# Patient Record
Sex: FEMALE | Race: WHITE | Employment: OTHER | URBAN - METROPOLITAN AREA
[De-identification: names, ages, dates, MRNs, and addresses within clinical notes are randomized per-mention and may not be internally consistent; named-entity substitution may affect disease eponyms.]

---

## 2018-12-21 ENCOUNTER — HOSPITAL ENCOUNTER (EMERGENCY)
Age: 63
Discharge: HOME | End: 2018-12-21
Attending: EMERGENCY MEDICINE
Payer: MEDICARE

## 2018-12-21 ENCOUNTER — APPOINTMENT (OUTPATIENT)
Dept: MRI IMAGING | Age: 63
End: 2018-12-21
Attending: NURSE PRACTITIONER
Payer: MEDICARE

## 2018-12-21 ENCOUNTER — APPOINTMENT (OUTPATIENT)
Dept: GENERAL RADIOLOGY | Age: 63
End: 2018-12-21
Attending: NURSE PRACTITIONER
Payer: MEDICARE

## 2018-12-21 VITALS
RESPIRATION RATE: 16 BRPM | TEMPERATURE: 98.1 F | SYSTOLIC BLOOD PRESSURE: 112 MMHG | HEART RATE: 57 BPM | DIASTOLIC BLOOD PRESSURE: 39 MMHG | OXYGEN SATURATION: 98 % | BODY MASS INDEX: 29.99 KG/M2 | WEIGHT: 180 LBS | HEIGHT: 65 IN

## 2018-12-21 DIAGNOSIS — M54.50 ACUTE MIDLINE LOW BACK PAIN WITHOUT SCIATICA: ICD-10-CM
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
## 2018-12-21 PROCEDURE — 81003 URINALYSIS AUTO W/O SCOPE: CPT | Performed by: NURSE PRACTITIONER

## 2018-12-21 PROCEDURE — 96372 THER/PROPH/DIAG INJ SC/IM: CPT | Performed by: NURSE PRACTITIONER

## 2018-12-21 PROCEDURE — 74011250636 HC RX REV CODE- 250/636: Performed by: NURSE PRACTITIONER

## 2018-12-21 PROCEDURE — 99284 EMERGENCY DEPT VISIT MOD MDM: CPT | Performed by: NURSE PRACTITIONER

## 2018-12-21 PROCEDURE — 72148 MRI LUMBAR SPINE W/O DYE: CPT

## 2018-12-21 PROCEDURE — 72100 X-RAY EXAM L-S SPINE 2/3 VWS: CPT

## 2018-12-21 PROCEDURE — 74011250636 HC RX REV CODE- 250/636

## 2018-12-21 PROCEDURE — 74011250637 HC RX REV CODE- 250/637: Performed by: NURSE PRACTITIONER

## 2018-12-21 RX ORDER — HYDROMORPHONE HYDROCHLORIDE 1 MG/ML
1 INJECTION, SOLUTION INTRAMUSCULAR; INTRAVENOUS; SUBCUTANEOUS
Status: COMPLETED | OUTPATIENT
Start: 2018-12-21 | End: 2018-12-21

## 2018-12-21 RX ORDER — FLUOXETINE HYDROCHLORIDE 20 MG/1
20 CAPSULE ORAL DAILY
COMMUNITY

## 2018-12-21 RX ORDER — EZETIMIBE 10 MG/1
10 TABLET ORAL DAILY
COMMUNITY
End: 2021-08-05 | Stop reason: SDUPTHER

## 2018-12-21 RX ORDER — PREDNISONE 10 MG/1
TABLET ORAL
Qty: 50 TAB | Refills: 0 | Status: SHIPPED | OUTPATIENT
Start: 2018-12-21 | End: 2021-03-13

## 2018-12-21 RX ORDER — TIZANIDINE 4 MG/1
4 TABLET ORAL
COMMUNITY
Start: 2018-03-14 | End: 2021-03-13

## 2018-12-21 RX ORDER — HYDROCODONE BITARTRATE AND ACETAMINOPHEN 5; 325 MG/1; MG/1
1 TABLET ORAL
Qty: 20 TAB | Refills: 0 | Status: SHIPPED | OUTPATIENT
Start: 2018-12-21 | End: 2019-01-22 | Stop reason: ALTCHOICE

## 2018-12-21 RX ORDER — LAMOTRIGINE 200 MG/1
200 TABLET ORAL DAILY
COMMUNITY
End: 2021-03-13

## 2018-12-21 RX ORDER — PROPRANOLOL HYDROCHLORIDE 20 MG/1
20 TABLET ORAL 2 TIMES DAILY
COMMUNITY
End: 2021-08-05

## 2018-12-21 RX ORDER — RAMIPRIL 10 MG/1
10 CAPSULE ORAL DAILY
COMMUNITY
End: 2021-08-05 | Stop reason: SDUPTHER

## 2018-12-21 RX ORDER — DEXAMETHASONE SODIUM PHOSPHATE 100 MG/10ML
INJECTION INTRAMUSCULAR; INTRAVENOUS
Status: COMPLETED
Start: 2018-12-21 | End: 2018-12-21

## 2018-12-21 RX ORDER — ATORVASTATIN CALCIUM 80 MG/1
80 TABLET, FILM COATED ORAL
COMMUNITY
End: 2018-12-21

## 2018-12-21 RX ORDER — DEXAMETHASONE SODIUM PHOSPHATE 100 MG/10ML
10 INJECTION INTRAMUSCULAR; INTRAVENOUS
Status: COMPLETED | OUTPATIENT
Start: 2018-12-21 | End: 2018-12-21

## 2018-12-21 RX ORDER — OMEPRAZOLE 40 MG/1
40 CAPSULE, DELAYED RELEASE ORAL
COMMUNITY
End: 2021-08-05 | Stop reason: SDUPTHER

## 2018-12-21 RX ORDER — HYDROCODONE BITARTRATE AND ACETAMINOPHEN 5; 325 MG/1; MG/1
1 TABLET ORAL
Status: COMPLETED | OUTPATIENT
Start: 2018-12-21 | End: 2018-12-21

## 2018-12-21 RX ADMIN — HYDROCODONE BITARTRATE AND ACETAMINOPHEN 1 TABLET: 5; 325 TABLET ORAL at 13:03

## 2018-12-21 RX ADMIN — HYDROMORPHONE HYDROCHLORIDE 1 MG: 1 INJECTION, SOLUTION INTRAMUSCULAR; INTRAVENOUS; SUBCUTANEOUS at 13:46

## 2018-12-21 RX ADMIN — DEXAMETHASONE SODIUM PHOSPHATE 10 MG: 10 INJECTION INTRAMUSCULAR; INTRAVENOUS at 15:54

## 2018-12-21 RX ADMIN — DEXAMETHASONE SODIUM PHOSPHATE 10 MG: 100 INJECTION INTRAMUSCULAR; INTRAVENOUS at 15:54

## 2018-12-21 NOTE — ED PROVIDER NOTES
Patient presents with chronic low back pain that increased 1 week ago after a fall. Patient states pain stays in her lower back. She states her left leg has been \"giving away\" since the fall. She denies numbness and tingling in her lower extremities. She denies problems with urination and with having a bowel movement. The history is provided by the patient. Past Medical History:   Diagnosis Date    Anxiety     Depression     GERD (gastroesophageal reflux disease)     HTN (hypertension)     Insomnia     Panic attack     Tremors of nervous system        Past Surgical History:   Procedure Laterality Date    HX APPENDECTOMY      HX CARPAL TUNNEL RELEASE      bilateral    HX LITHOTRIPSY      HX ORTHOPAEDIC      lower back    HX OTHER SURGICAL      partial bowel removal as child    HX TUBAL LIGATION           No family history on file. Social History     Socioeconomic History    Marital status:      Spouse name: Not on file    Number of children: Not on file    Years of education: Not on file    Highest education level: Not on file   Social Needs    Financial resource strain: Not on file    Food insecurity - worry: Not on file    Food insecurity - inability: Not on file    Transportation needs - medical: Not on file   [employer] needs - non-medical: Not on file   Occupational History    Not on file   Tobacco Use    Smoking status: Former Smoker     Last attempt to quit: 3/29/2013     Years since quittin.7   Substance and Sexual Activity    Alcohol use: Yes     Comment: rare    Drug use: No    Sexual activity: Not on file   Other Topics Concern    Not on file   Social History Narrative    Not on file         ALLERGIES: Patient has no known allergies. Review of Systems   Constitutional: Negative for chills and fever. Respiratory: Negative for shortness of breath. Cardiovascular: Negative for chest pain.    Gastrointestinal: Negative for abdominal pain, constipation and diarrhea. Genitourinary: Negative for difficulty urinating. Musculoskeletal: Positive for back pain. Neurological: Positive for weakness. Negative for numbness. Vitals:    12/21/18 1157   BP: 155/65   Pulse: 65   Resp: 16   Temp: 98 °F (36.7 °C)   SpO2: 95%   Weight: 81.6 kg (180 lb)   Height: 5' 5\" (1.651 m)            Physical Exam   Constitutional: She is oriented to person, place, and time. No distress. Uncomfortable    HENT:   Head: Normocephalic and atraumatic. Cardiovascular: Normal rate and regular rhythm. Pulmonary/Chest: Effort normal and breath sounds normal. No respiratory distress. Musculoskeletal:        Left ankle: Normal. She exhibits normal pulse. Lumbar back: She exhibits tenderness and pain. Left foot: Normal. There is no swelling and normal capillary refill. Neurological: She is alert and oriented to person, place, and time. No sensory deficit. GCS eye subscore is 4. GCS verbal subscore is 5. GCS motor subscore is 6. Reflex Scores:       Patellar reflexes are 2+ on the right side and 1+ on the left side. Left lower extremity weakness   Skin: Skin is warm and dry. She is not diaphoretic. Psychiatric: She has a normal mood and affect. Her behavior is normal.   Nursing note and vitals reviewed. Xr Spine Lumb 2 Or 3 V    Result Date: 12/21/2018  Lumbar spine series INDICATION: Pain after fall 2 weeks ago. Left lumbar pain into legs FINDINGS: 3 images of the lumbar spine show disc space narrowing at L4-L5 with borderline grade 2 anterolisthesis and small anterior osteophytes. No convincing spondylolysis. Vertebral body heights are normal. Remainder of the lumbar levels are maintained. There are prominent thoracic degenerative changes at T11-T12. IMPRESSION: Degenerative changes at L4-L5 and T11-T12.     Mri Lumb Spine Wo Cont    Result Date: 12/21/2018  MRI of the Lumbar Spine INDICATION:  Increased back pain after falling, left lower extremity weakness Standard MRI sequences were obtained through the lumbar spine in multiple planes. FINDINGS: L1-L2: Unremarkable. L2-L3: Unremarkable. L3-L4: There is mild facet hypertrophy, no significant stenosis. L4-L5: There is a broad-based disc herniation with left paracentral extrusion behind the L4 vertebral body. The disc also extends into both neural foramen, more on the left. Associated severe left foraminal stenosis. L5-S1: There is mild degenerative disc and facet disease. There is mild midline bulging the disc, no significant nerve root compression. There is no evidence of fracture or subluxation. No bony lesions are seen. There is normal signal throughout the lumbar spinal cord. There is a 4 cm cyst in the left kidney. IMPRESSION: Large L4-5 disc herniation with left paracentral extrusion and bilateral foraminal component. Associated severe left foraminal stenosis at the L4-5 level. 1:30 PM-discussed patient with Dr. Junior Cabrera. He agrees with MRI. Order placed at this time. Patient states she continues to have pain after Norco. Additional orders for IM dilaudid placed. 3:40 PM-patient able to ambulate without difficulty. She states pain in her left leg. She denies numbness and tingling in lower extremities. 3:50 PM-discussed patient with Dr. Valerie Dominguez. He suggested steroids and office follow up. MDM  Number of Diagnoses or Management Options  Acute midline low back pain without sciatica:   Fall, initial encounter:   Diagnosis management comments: Xray negative for acute findings. MRI with large disc herniation. Patient and radiology results discussed with Dr. Junior Cabrera and Dr. Valerie Dominguez. UA negative for infection. Patient given PO norco, po decadron,  and IM dilaudid while in the department.         Amount and/or Complexity of Data Reviewed  Clinical lab tests: ordered and reviewed  Tests in the radiology section of CPT®: ordered and reviewed  Discuss the patient with other providers: yes (Flowers and lisa)    Patient Progress  Patient progress: stable         Procedures

## 2018-12-21 NOTE — ED TRIAGE NOTES
Lower back pain since last Saturday, throbbing in nature, but occasional sharp pains. Chronic trouble with back, but has worsened recently. Pt states she fell a couple weeks ago.

## 2018-12-21 NOTE — DISCHARGE INSTRUCTIONS
Prednisone as prescribed  Norco as needed for pain relief  Call Dr. Bettina Soto office to schedule a follow up appointment. Return to the Emergency Department if you develop numbness,  urinary incontinence, and/or develop difficulty with controlling your bowels.

## 2018-12-21 NOTE — ED NOTES
I have reviewed discharge instructions with the patient. The patient and spouse verbalized understanding. Patient left ED via Discharge Method: wheelchair to Home with . Opportunity for questions and clarification provided. Patient given 2 scripts. No e-sign        To continue your aftercare when you leave the hospital, you may receive an automated call from our care team to check in on how you are doing. This is a free service and part of our promise to provide the best care and service to meet your aftercare needs.  If you have questions, or wish to unsubscribe from this service please call [phone]. Thank you for Choosing our [facility] Emergency Department.

## 2019-01-08 PROBLEM — M48.062 LUMBAR STENOSIS WITH NEUROGENIC CLAUDICATION: Status: ACTIVE | Noted: 2019-01-08

## 2019-01-08 PROBLEM — M51.26 HNP (HERNIATED NUCLEUS PULPOSUS), LUMBAR: Status: ACTIVE | Noted: 2019-01-08

## 2019-01-08 PROBLEM — M43.16 SPONDYLOLISTHESIS AT L4-L5 LEVEL: Status: ACTIVE | Noted: 2019-01-08

## 2019-01-14 ENCOUNTER — HOSPITAL ENCOUNTER (OUTPATIENT)
Dept: INTERVENTIONAL RADIOLOGY/VASCULAR | Age: 64
Discharge: HOME | End: 2019-01-14
Attending: NEUROLOGICAL SURGERY
Payer: MEDICARE

## 2019-01-14 ENCOUNTER — HOSPITAL ENCOUNTER (OUTPATIENT)
Dept: CT IMAGING | Age: 64
Discharge: HOME | End: 2019-01-14
Attending: NEUROLOGICAL SURGERY
Payer: MEDICARE

## 2019-01-14 VITALS
BODY MASS INDEX: 29.99 KG/M2 | OXYGEN SATURATION: 94 % | RESPIRATION RATE: 18 BRPM | HEART RATE: 84 BPM | DIASTOLIC BLOOD PRESSURE: 59 MMHG | WEIGHT: 180 LBS | TEMPERATURE: 98.1 F | HEIGHT: 65 IN | SYSTOLIC BLOOD PRESSURE: 130 MMHG

## 2019-01-14 DIAGNOSIS — M48.062 LUMBAR STENOSIS WITH NEUROGENIC CLAUDICATION: ICD-10-CM

## 2019-01-14 DIAGNOSIS — M43.16 SPONDYLOLISTHESIS AT L4-L5 LEVEL: ICD-10-CM

## 2019-01-14 DIAGNOSIS — M51.26 HNP (HERNIATED NUCLEUS PULPOSUS), LUMBAR: ICD-10-CM

## 2019-01-14 PROCEDURE — 74011250636 HC RX REV CODE- 250/636: Performed by: PHYSICIAN ASSISTANT

## 2019-01-14 PROCEDURE — 72132 CT LUMBAR SPINE W/DYE: CPT

## 2019-01-14 PROCEDURE — 74011250637 HC RX REV CODE- 250/637: Performed by: PHYSICIAN ASSISTANT

## 2019-01-14 PROCEDURE — 62304 MYELOGRAPHY LUMBAR INJECTION: CPT

## 2019-01-14 PROCEDURE — 77030003666 HC NDL SPINAL BD -A

## 2019-01-14 PROCEDURE — 77030014143 HC TY PUNC LUMBR BD -A

## 2019-01-14 PROCEDURE — 74011250636 HC RX REV CODE- 250/636

## 2019-01-14 PROCEDURE — 74011636320 HC RX REV CODE- 636/320: Performed by: PHYSICIAN ASSISTANT

## 2019-01-14 RX ORDER — MORPHINE SULFATE 2 MG/ML
4 INJECTION, SOLUTION INTRAMUSCULAR; INTRAVENOUS ONCE
Status: COMPLETED | OUTPATIENT
Start: 2019-01-14 | End: 2019-01-14

## 2019-01-14 RX ORDER — LIDOCAINE HYDROCHLORIDE 20 MG/ML
1-20 INJECTION, SOLUTION EPIDURAL; INFILTRATION; INTRACAUDAL; PERINEURAL ONCE
Status: COMPLETED | OUTPATIENT
Start: 2019-01-14 | End: 2019-01-14

## 2019-01-14 RX ORDER — HYDROCODONE BITARTRATE AND ACETAMINOPHEN 5; 325 MG/1; MG/1
1 TABLET ORAL ONCE
Status: COMPLETED | OUTPATIENT
Start: 2019-01-14 | End: 2019-01-14

## 2019-01-14 RX ORDER — PROMETHAZINE HYDROCHLORIDE 25 MG/ML
6.25 INJECTION, SOLUTION INTRAMUSCULAR; INTRAVENOUS ONCE
Status: COMPLETED | OUTPATIENT
Start: 2019-01-14 | End: 2019-01-14

## 2019-01-14 RX ADMIN — HYDROCODONE BITARTRATE AND ACETAMINOPHEN 1 TABLET: 5; 325 TABLET ORAL at 12:36

## 2019-01-14 RX ADMIN — MORPHINE SULFATE 4 MG: 2 INJECTION, SOLUTION INTRAMUSCULAR; INTRAVENOUS at 14:00

## 2019-01-14 RX ADMIN — IOPAMIDOL 14 ML: 408 INJECTION, SOLUTION INTRATHECAL at 11:57

## 2019-01-14 RX ADMIN — PROMETHAZINE HYDROCHLORIDE 6.25 MG: 25 INJECTION INTRAMUSCULAR; INTRAVENOUS at 13:23

## 2019-01-14 RX ADMIN — LIDOCAINE HYDROCHLORIDE 200 MG: 20 INJECTION, SOLUTION EPIDURAL; INFILTRATION; INTRACAUDAL; PERINEURAL at 11:56

## 2019-01-14 NOTE — PROGRESS NOTES
Patient returns from CT via stretcher accompanied by CT tech. Patient complains of severe back pain. Per CT, it took 3 people to move patient from CT table due to pain. Patient received Norco at [phone]. Patient informed that the [place] should be given a little more time to work, but this RN would inform [facility] notified. No orders received at this time.

## 2019-01-14 NOTE — DISCHARGE INSTRUCTIONS
[address]  Department of Interventional Radiology  [phone] Office  [phone] Fax  MYELOGRAM/INTRATHECAL CHEMOTHERAPY DISCHARGE INSTRUCTIONS  General Information:  Myelogram:     A Myelogram involves a lumbar puncture, and instead of removing fluid, contrast will be injected into the sac surrounding the spinal column. It is done to visualize the spinal column, nerve roots, spinal canal, vertebral discs and disc space. It is usually done to diagnose back pain with unknown cause or in preparation for surgery. After the injection, a CT scan will be done, usually within two hours of the injection. Call If:     You should call your Physician and/or the Radiology Nurse if you develop a headache that is not relieved by Tylenol, and worsens when you stand and eases when you lie down, you need to call. You may have developed what is referred to as a spinal headache. Our physician's will probably advise you to be on strict bed rest for 24 hours, to drink lots of fluids and caffeine. If this does not help the head pain, call again the next day. You should call if you have bleeding other than a small spot on your bandage. You should call if you have any numbness, tingling, weakness, fever, chills, urinary retention, severe itching, rash, welts, swelling, or confusion. Follow-Up Instructions: See the doctor who ordered your procedure as he/she has instructed. If you had a Lumbar Puncture or Myelogram, your results should be available to your ordering doctor in 3-5 business days. You can remove your dressing in 24 hours and shower regularly. Do not bathe or swim for 72 hours. To Reach Us: If you have any questions about your procedure, please call the Interventional Radiology department at [phone]. After business hours (5pm) and weekends, call the answering service at [phone] and ask for the Radiologist on call to be paged.      Interventional Radiology General Nurse Discharge    After general anesthesia or intravenous sedation, for 24 hours or while taking prescription Narcotics:  · Limit your activities  · Do not drive and operate hazardous machinery  · Do not make important personal or business decisions  · Do  not drink alcoholic beverages  · If you have not urinated within 8 hours after discharge, please contact your surgeon on call. * Please give a list of your current medications to your Primary Care Provider. * Please update this list whenever your medications are discontinued, doses are     changed, or new medications (including over-the-counter products) are added. * Please carry medication information at all times in case of emergency situations. These are general instructions for a healthy lifestyle:    No smoking/ No tobacco products/ Avoid exposure to second hand smoke  Surgeon General's Warning:  Quitting smoking now greatly reduces serious risk to your health. Obesity, smoking, and sedentary lifestyle greatly increases your risk for illness  A healthy diet, regular physical exercise & weight monitoring are important for maintaining a healthy lifestyle    You may be retaining fluid if you have a history of heart failure or if you experience any of the following symptoms:  Weight gain of 3 pounds or more overnight or 5 pounds in a week, increased swelling in our hands or feet or shortness of breath while lying flat in bed. Please call your doctor as soon as you notice any of these symptoms; do not wait until your next office visit. Recognize signs and symptoms of STROKE:  F-face looks uneven    A-arms unable to move or move unevenly    S-speech slurred or non-existent    T-time-call 911 as soon as signs and symptoms begin-DO NOT go       Back to bed or wait to see if you get better-TIME IS BRAIN.       Patient Signature:  Date: 1/14/2019  Discharging Nurse: Cintia Hardwick RN

## 2019-01-14 NOTE — PROGRESS NOTES
TRANSFER - OUT REPORT:    Verbal report given to Yeimi Gonzalez RN(name) on Zandra Barbours  being transferred to [place]) for routine post - op       Report consisted of patients Situation, Background, Assessment and   Recommendations(SBAR). Information from the following report(s) SBAR, Kardex, Procedure Summary and MAR was reviewed with the receiving nurse. Opportunity for questions and clarification was provided.

## 2019-01-27 ENCOUNTER — HOSPITAL ENCOUNTER (EMERGENCY)
Age: 64
Discharge: HOME | End: 2019-01-27
Payer: MEDICARE

## 2019-01-27 ENCOUNTER — APPOINTMENT (OUTPATIENT)
Dept: CT IMAGING | Age: 64
End: 2019-01-27
Payer: MEDICARE

## 2019-01-27 VITALS
BODY MASS INDEX: 30.66 KG/M2 | OXYGEN SATURATION: 94 % | TEMPERATURE: 98.1 F | HEART RATE: 76 BPM | WEIGHT: 184 LBS | SYSTOLIC BLOOD PRESSURE: 128 MMHG | RESPIRATION RATE: 18 BRPM | HEIGHT: 65 IN | DIASTOLIC BLOOD PRESSURE: 60 MMHG

## 2019-01-27 DIAGNOSIS — M43.16 SPONDYLOLISTHESIS AT L4-L5 LEVEL: ICD-10-CM

## 2019-01-27 DIAGNOSIS — M54.9 INTRACTABLE BACK PAIN: Primary | ICD-10-CM

## 2019-01-27 DIAGNOSIS — M51.26 HNP (HERNIATED NUCLEUS PULPOSUS), LUMBAR: ICD-10-CM

## 2019-01-27 LAB
ALBUMIN SERPL-MCNC: 3.6 G/DL (ref 3.2–4.6)
ALBUMIN/GLOB SERPL: 1.1 {RATIO} (ref 1.2–3.5)
ALP SERPL-CCNC: 101 U/L (ref 50–136)
ALT SERPL-CCNC: 22 U/L (ref 12–65)
ANION GAP SERPL CALC-SCNC: 7 MMOL/L (ref 7–16)
AST SERPL-CCNC: 26 U/L (ref 15–37)
BASOPHILS # BLD: 0 K/UL (ref 0–0.2)
BASOPHILS NFR BLD: 0 % (ref 0–2)
BILIRUB SERPL-MCNC: 0.5 MG/DL (ref 0.2–1.1)
BUN SERPL-MCNC: 15 MG/DL (ref 8–23)
CALCIUM SERPL-MCNC: 8.9 MG/DL (ref 8.3–10.4)
CHLORIDE SERPL-SCNC: 106 MMOL/L (ref 98–107)
CO2 SERPL-SCNC: 26 MMOL/L (ref 21–32)
CREAT SERPL-MCNC: 0.85 MG/DL (ref 0.6–1)
DIFFERENTIAL METHOD BLD: ABNORMAL
EOSINOPHIL # BLD: 0 K/UL (ref 0–0.8)
EOSINOPHIL NFR BLD: 0 % (ref 0.5–7.8)
ERYTHROCYTE [DISTWIDTH] IN BLOOD BY AUTOMATED COUNT: 13.1 % (ref 11.9–14.6)
GLOBULIN SER CALC-MCNC: 3.4 G/DL (ref 2.3–3.5)
GLUCOSE SERPL-MCNC: 163 MG/DL (ref 65–100)
HCT VFR BLD AUTO: 36.1 % (ref 35.8–46.3)
HGB BLD-MCNC: 12 G/DL (ref 11.7–15.4)
IMM GRANULOCYTES # BLD AUTO: 0.1 K/UL (ref 0–0.5)
IMM GRANULOCYTES NFR BLD AUTO: 1 % (ref 0–5)
LYMPHOCYTES # BLD: 1.4 K/UL (ref 0.5–4.6)
LYMPHOCYTES NFR BLD: 18 % (ref 13–44)
MCH RBC QN AUTO: 31.7 PG (ref 26.1–32.9)
MCHC RBC AUTO-ENTMCNC: 33.2 G/DL (ref 31.4–35)
MCV RBC AUTO: 95.5 FL (ref 79.6–97.8)
MONOCYTES # BLD: 0.1 K/UL (ref 0.1–1.3)
MONOCYTES NFR BLD: 1 % (ref 4–12)
NEUTS SEG # BLD: 6 K/UL (ref 1.7–8.2)
NEUTS SEG NFR BLD: 79 % (ref 43–78)
NRBC # BLD: 0 K/UL (ref 0–0.2)
PLATELET # BLD AUTO: 232 K/UL (ref 150–450)
PMV BLD AUTO: 9.9 FL (ref 9.4–12.3)
POTASSIUM SERPL-SCNC: 4 MMOL/L (ref 3.5–5.1)
PROT SERPL-MCNC: 7 G/DL (ref 6.3–8.2)
RBC # BLD AUTO: 3.78 M/UL (ref 4.05–5.2)
SODIUM SERPL-SCNC: 139 MMOL/L (ref 136–145)
WBC # BLD AUTO: 7.5 K/UL (ref 4.3–11.1)

## 2019-01-27 PROCEDURE — 74011000258 HC RX REV CODE- 258

## 2019-01-27 PROCEDURE — 74011250636 HC RX REV CODE- 250/636

## 2019-01-27 PROCEDURE — 99285 EMERGENCY DEPT VISIT HI MDM: CPT

## 2019-01-27 PROCEDURE — 85025 COMPLETE CBC W/AUTO DIFF WBC: CPT

## 2019-01-27 PROCEDURE — 74177 CT ABD & PELVIS W/CONTRAST: CPT

## 2019-01-27 PROCEDURE — 96374 THER/PROPH/DIAG INJ IV PUSH: CPT

## 2019-01-27 PROCEDURE — 74011636320 HC RX REV CODE- 636/320

## 2019-01-27 PROCEDURE — 96375 TX/PRO/DX INJ NEW DRUG ADDON: CPT

## 2019-01-27 PROCEDURE — 74011250637 HC RX REV CODE- 250/637

## 2019-01-27 PROCEDURE — 80053 COMPREHEN METABOLIC PANEL: CPT

## 2019-01-27 RX ORDER — SODIUM CHLORIDE 0.9 % (FLUSH) 0.9 %
10 SYRINGE (ML) INJECTION
Status: COMPLETED | OUTPATIENT
Start: 2019-01-27 | End: 2019-01-27

## 2019-01-27 RX ORDER — METHOCARBAMOL 500 MG/1
1500 TABLET, FILM COATED ORAL
Status: COMPLETED | OUTPATIENT
Start: 2019-01-27 | End: 2019-01-27

## 2019-01-27 RX ORDER — DEXAMETHASONE SODIUM PHOSPHATE 100 MG/10ML
10 INJECTION INTRAMUSCULAR; INTRAVENOUS
Status: COMPLETED | OUTPATIENT
Start: 2019-01-27 | End: 2019-01-27

## 2019-01-27 RX ORDER — ONDANSETRON 2 MG/ML
4 INJECTION INTRAMUSCULAR; INTRAVENOUS
Status: COMPLETED | OUTPATIENT
Start: 2019-01-27 | End: 2019-01-27

## 2019-01-27 RX ORDER — HALOPERIDOL 5 MG/1
10 TABLET ORAL
Status: COMPLETED | OUTPATIENT
Start: 2019-01-27 | End: 2019-01-27

## 2019-01-27 RX ORDER — LIDOCAINE 50 MG/G
PATCH TOPICAL
Qty: 4 EACH | Refills: 0 | Status: SHIPPED | OUTPATIENT
Start: 2019-01-27 | End: 2022-01-06 | Stop reason: ALTCHOICE

## 2019-01-27 RX ORDER — HYDROMORPHONE HYDROCHLORIDE 1 MG/ML
1 INJECTION, SOLUTION INTRAMUSCULAR; INTRAVENOUS; SUBCUTANEOUS
Status: COMPLETED | OUTPATIENT
Start: 2019-01-27 | End: 2019-01-27

## 2019-01-27 RX ORDER — HYDROXYZINE PAMOATE 25 MG/1
25 CAPSULE ORAL
Status: COMPLETED | OUTPATIENT
Start: 2019-01-27 | End: 2019-01-27

## 2019-01-27 RX ORDER — PREDNISONE 50 MG/1
50 TABLET ORAL DAILY
Qty: 3 TAB | Refills: 0 | Status: SHIPPED | OUTPATIENT
Start: 2019-01-27 | End: 2019-01-30

## 2019-01-27 RX ADMIN — METHOCARBAMOL 1500 MG: 500 TABLET ORAL at 07:08

## 2019-01-27 RX ADMIN — HYDROMORPHONE HYDROCHLORIDE 1 MG: 1 INJECTION, SOLUTION INTRAMUSCULAR; INTRAVENOUS; SUBCUTANEOUS at 07:06

## 2019-01-27 RX ADMIN — HALOPERIDOL 10 MG: 5 TABLET ORAL at 11:07

## 2019-01-27 RX ADMIN — SODIUM CHLORIDE 100 ML: 900 INJECTION, SOLUTION INTRAVENOUS at 14:25

## 2019-01-27 RX ADMIN — DEXAMETHASONE SODIUM PHOSPHATE 10 MG: 10 INJECTION INTRAMUSCULAR; INTRAVENOUS at 07:06

## 2019-01-27 RX ADMIN — HYDROXYZINE PAMOATE 25 MG: 25 CAPSULE ORAL at 11:40

## 2019-01-27 RX ADMIN — Medication 10 ML: at 14:25

## 2019-01-27 RX ADMIN — ONDANSETRON 4 MG: 2 INJECTION INTRAMUSCULAR; INTRAVENOUS at 07:05

## 2019-01-27 RX ADMIN — IOPAMIDOL 100 ML: 755 INJECTION, SOLUTION INTRAVENOUS at 14:25

## 2019-01-27 NOTE — ED PROVIDER NOTES
68-year-old female complaining of low back pain radiating around her left groin. Patient has a history of herniated disc bulging into the foramina  Patient's been followed by Dr. Uvaldo Mauricio and has had CTs and MRIs of her back and is scheduled for intervention soon. Patient was lifting laundry yesterday did not fill pain then however woke up overnight with severe left lower back pain radiating around to the left groin area. She describes the pain is typical for her back pain however today she is having increased burning down the left leg. Patient able to ambulate with minimal difficulty strength is good. The history is provided by the patient. Back Pain This is a new problem. The current episode started 6 to 12 hours ago. The problem has been rapidly worsening. The problem occurs constantly. The pain is associated with lifting. The pain is present in the lumbar spine. The quality of the pain is described as stabbing, aching and burning. The pain radiates to the left thigh and left groin. The pain is at a severity of 10/10. The pain is severe. The symptoms are aggravated by bending and twisting. The pain is worse during the night. Associated symptoms include abdominal pain and paresthesias. Past Medical History:  
Diagnosis Date  Anxiety  Depression  GERD (gastroesophageal reflux disease)  HTN (hypertension)  Insomnia  Panic attack  Tremors of nervous system Past Surgical History:  
Procedure Laterality Date  HX APPENDECTOMY  HX CARPAL TUNNEL RELEASE    
 bilateral  
 HX LITHOTRIPSY  HX ORTHOPAEDIC    
 lower back  HX OTHER SURGICAL    
 partial bowel removal as child  HX TUBAL LIGATION No family history on file. Social History Socioeconomic History  Marital status:  Spouse name: Not on file  Number of children: Not on file  Years of education: Not on file  Highest education level: Not on file Social Needs  Financial resource strain: Not on file  Food insecurity - worry: Not on file  Food insecurity - inability: Not on file  Transportation needs - medical: Not on file  Transportation needs - non-medical: Not on file Occupational History  Not on file Tobacco Use  Smoking status: Former Smoker Last attempt to quit: 3/29/2013 Years since quittin.8  Smokeless tobacco: Never Used Substance and Sexual Activity  Alcohol use: Yes Comment: rare  Drug use: No  
 Sexual activity: Not on file Other Topics Concern  Not on file Social History Narrative  Not on file ALLERGIES: Patient has no known allergies. Review of Systems Constitutional: Negative. Negative for activity change. HENT: Negative. Eyes: Negative. Respiratory: Negative. Cardiovascular: Negative. Gastrointestinal: Positive for abdominal pain. Genitourinary: Negative. Musculoskeletal: Positive for back pain. Skin: Negative. Neurological: Positive for paresthesias. Psychiatric/Behavioral: Negative. All other systems reviewed and are negative. Vitals:  
 19 2242 19 1939 19 9213 19 9022 BP: 117/57 117/56 119/57 131/61 Pulse: 67 67 67 74 Resp:      
Temp:      
SpO2: 92% 92% 91% 92% Weight:      
Height:      
      
 
Physical Exam  
Constitutional: She is oriented to person, place, and time. She appears well-developed and well-nourished. She appears distressed. HENT:  
Head: Normocephalic and atraumatic. Right Ear: External ear normal.  
Left Ear: External ear normal.  
Nose: Nose normal.  
Mouth/Throat: Oropharynx is clear and moist. No oropharyngeal exudate. Eyes: Conjunctivae and EOM are normal. Pupils are equal, round, and reactive to light. Right eye exhibits no discharge. Left eye exhibits no discharge. No scleral icterus. Neck: Normal range of motion. Neck supple. No JVD present.  No tracheal deviation present. Cardiovascular: Normal rate, regular rhythm and intact distal pulses. Pulmonary/Chest: Effort normal and breath sounds normal. No stridor. No respiratory distress. She has no wheezes. She has no rales. She exhibits no tenderness. Abdominal: Soft. Bowel sounds are normal. She exhibits no distension and no mass. There is tenderness in the left lower quadrant. Musculoskeletal: Normal range of motion. She exhibits no edema. Lumbar back: She exhibits tenderness and pain. Back: 
 
Neurological: She is alert and oriented to person, place, and time. No cranial nerve deficit. She exhibits normal muscle tone. Coordination normal.  
Skin: Skin is warm and dry. No rash noted. She is not diaphoretic. No erythema. No pallor. Psychiatric: She has a normal mood and affect. Her behavior is normal. Thought content normal.  
Nursing note and vitals reviewed. MDM Number of Diagnoses or Management Options HNP (herniated nucleus pulposus), lumbar: established and worsening Intractable back pain: established and worsening Spondylolisthesis at L4-L5 level: established and worsening Diagnosis management comments: Patient felt the pain to her groin was similar to previous pain from her back. She  She got very good relief with the initial dose of narcotics however an hour later was complaining of severe pain again Another dose of Dilaudid and gain some more relief however on reexamination of her abdomen she seems to be having increasing tenderness in the left lower quadrant. Due to known back pain and increased pain medicine needs we will skip scan her abdomen for intra-abdominal pathology. Procedures

## 2019-01-27 NOTE — DISCHARGE INSTRUCTIONS
Patient Education        Learning About Relief for Back Pain  What is back tension and strain? Back strain happens when you overstretch, or pull, a muscle in your back. You may hurt your back in an accident or when you exercise or lift something. Most back pain will get better with rest and time. You can take care of yourself at home to help your back heal.  What can you do first to relieve back pain? When you first feel back pain, try these steps:  · Walk. Take a short walk (10 to 20 minutes) on a level surface (no slopes, hills, or stairs) every 2 to 3 hours. Walk only distances you can manage without pain, especially leg pain. · Relax. Find a comfortable position for rest. Some people are comfortable on the floor or a medium-firm bed with a small pillow under their head and another under their knees. Some people prefer to lie on their side with a pillow between their knees. Don't stay in one position for too long. · Try heat or ice. Try using a heating pad on a low or medium setting, or take a warm shower, for 15 to 20 minutes every 2 to 3 hours. Or you can buy single-use heat wraps that last up to 8 hours. You can also try an ice pack for 10 to 15 minutes every 2 to 3 hours. You can use an ice pack or a bag of frozen vegetables wrapped in a thin towel. There is not strong evidence that either heat or ice will help, but you can try them to see if they help. You may also want to try switching between heat and cold. · Take pain medicine exactly as directed. ? If the doctor gave you a prescription medicine for pain, take it as prescribed. ? If you are not taking a prescription pain medicine, ask your doctor if you can take an over-the-counter medicine. What else can you do? · Stretch and exercise. Exercises that increase flexibility may relieve your pain and make it easier for your muscles to keep your spine in a good, neutral position. And don't forget to keep walking. · Do self-massage.  You can use self-massage to unwind after work or school or to energize yourself in the morning. You can easily massage your feet, hands, or neck. Self-massage works best if you are in comfortable clothes and are sitting or lying in a comfortable position. Use oil or lotion to massage bare skin. · Reduce stress. Back pain can lead to a vicious [place]: Distress about the pain tenses the muscles in your back, which in turn causes more pain. Learn how to relax your mind and your muscles to lower your stress. Where can you learn more? Go to http://javier-anitha.info/. Enter V193 in the search box to learn more about \"Learning About Relief for Back Pain. \"  Current as of: September 20, 2018  Content Version: 11.9  © 0654-3079 [employer]. Care instructions adapted under license by [employer] (which disclaims liability or warranty for this information). If you have questions about a medical condition or this instruction, always ask your healthcare professional. [address] any warranty or liability for your use of this information.

## 2019-01-27 NOTE — ED NOTES
I have reviewed discharge instructions with the patient and spouse. The patient and spouse verbalized understanding. Patient left ED via Discharge Method: ambulatory to Home with . Opportunity for questions and clarification provided. Patient given 2 scripts. No e-sign. To continue your aftercare when you leave the hospital, you may receive an automated call from our care team to check in on how you are doing. This is a free service and part of our promise to provide the best care and service to meet your aftercare needs.  If you have questions, or wish to unsubscribe from this service please call [phone]. Thank you for Choosing our [facility] Emergency Department.

## 2019-01-27 NOTE — ED NOTES
Patient up to bathroom with family's assistance. Patient reports weakness during ambulation but states this is normal for her.

## 2019-01-27 NOTE — ED TRIAGE NOTES
Pt states she fell back in December and ruptured a disc in her lower lumbar spine. States she has an appointment at [facility] this Thursday for an evaluation for surgery. States she currently sees Dr. Sujatha Hernandez. States the pain started last night when she went to bed and it has casually started to get worse. States the pain is in her lower back and radiating down her left leg. States it is sharp and burning

## 2020-05-12 ENCOUNTER — HOSPITAL ENCOUNTER (EMERGENCY)
Age: 65
Discharge: HOME | End: 2020-05-12
Attending: EMERGENCY MEDICINE
Payer: MEDICARE

## 2020-05-12 ENCOUNTER — APPOINTMENT (OUTPATIENT)
Dept: GENERAL RADIOLOGY | Age: 65
End: 2020-05-12
Attending: PHYSICIAN ASSISTANT
Payer: MEDICARE

## 2020-05-12 VITALS
TEMPERATURE: 98.2 F | WEIGHT: 173 LBS | DIASTOLIC BLOOD PRESSURE: 71 MMHG | HEART RATE: 68 BPM | SYSTOLIC BLOOD PRESSURE: 160 MMHG | HEIGHT: 65 IN | RESPIRATION RATE: 18 BRPM | OXYGEN SATURATION: 98 % | BODY MASS INDEX: 28.82 KG/M2

## 2020-05-12 DIAGNOSIS — M43.16 SPONDYLOLISTHESIS, LUMBAR REGION: ICD-10-CM

## 2020-05-12 DIAGNOSIS — M17.10 ARTHRITIS OF KNEE: Primary | ICD-10-CM

## 2020-05-12 PROCEDURE — 99282 EMERGENCY DEPT VISIT SF MDM: CPT

## 2020-05-12 PROCEDURE — 73562 X-RAY EXAM OF KNEE 3: CPT

## 2020-05-12 RX ORDER — MELOXICAM 7.5 MG/1
7.5 TABLET ORAL DAILY
Qty: 30 TAB | Refills: 0 | Status: SHIPPED | OUTPATIENT
Start: 2020-05-12 | End: 2020-06-11

## 2020-05-12 RX ORDER — TRAMADOL HYDROCHLORIDE 50 MG/1
50 TABLET ORAL
Qty: 21 TAB | Refills: 0 | Status: SHIPPED | OUTPATIENT
Start: 2020-05-12 | End: 2020-05-19

## 2020-05-12 NOTE — ED PROVIDER NOTES
The history is provided by the patient. Knee Pain    This is a new problem. The current episode started yesterday. The problem occurs constantly. The problem has not changed since onset. The pain is present in the right knee. The quality of the pain is described as aching. The pain is at a severity of 8/10. The pain is moderate. Associated symptoms include stiffness. The symptoms are aggravated by activity and palpation. She has tried nothing for the symptoms. The treatment provided no relief. There has been no history of extremity trauma. Past Medical History:   Diagnosis Date    Anxiety     Depression     GERD (gastroesophageal reflux disease)     HTN (hypertension)     Insomnia     Panic attack     Tremors of nervous system        Past Surgical History:   Procedure Laterality Date    HX APPENDECTOMY      HX CARPAL TUNNEL RELEASE      bilateral    HX LITHOTRIPSY      HX ORTHOPAEDIC      lower back    HX OTHER SURGICAL      partial bowel removal as child    HX TUBAL LIGATION           No family history on file.     Social History     Socioeconomic History    Marital status:      Spouse name: Not on file    Number of children: Not on file    Years of education: Not on file    Highest education level: Not on file   Occupational History    Not on file   Social Needs    Financial resource strain: Not on file    Food insecurity     Worry: Not on file     Inability: Not on file    Transportation needs     Medical: Not on file     Non-medical: Not on file   Tobacco Use    Smoking status: Former Smoker     Last attempt to quit: 3/29/2013     Years since quittin.1    Smokeless tobacco: Never Used   Substance and Sexual Activity    Alcohol use: Yes     Comment: rare    Drug use: No    Sexual activity: Not on file   Lifestyle    Physical activity     Days per week: Not on file     Minutes per session: Not on file    Stress: Not on file   Relationships    Social connections Talks on phone: Not on file     Gets together: Not on file     Attends Religion service: Not on file     Active member of club or organization: Not on file     Attends meetings of clubs or organizations: Not on file     Relationship status: Not on file    Intimate partner violence     Fear of current or ex partner: Not on file     Emotionally abused: Not on file     Physically abused: Not on file     Forced sexual activity: Not on file   Other Topics Concern    Not on file   Social History Narrative    Not on file         ALLERGIES: Ambien [zolpidem]    Review of Systems   Musculoskeletal: Positive for stiffness. All other systems reviewed and are negative. Vitals:    05/12/20 1300   BP: 160/71   Pulse: 68   Resp: 18   Temp: 98.2 °F (36.8 °C)   SpO2: 98%   Weight: 78.5 kg (173 lb)   Height: 5' 5\" (1.651 m)            Physical Exam  Vitals signs and nursing note reviewed. Constitutional:       General: She is not in acute distress. Appearance: Normal appearance. She is well-developed and normal weight. She is not diaphoretic. HENT:      Head: Normocephalic and atraumatic. Eyes:      Pupils: Pupils are equal, round, and reactive to light. Neck:      Musculoskeletal: Normal range of motion and neck supple. Cardiovascular:      Rate and Rhythm: Normal rate and regular rhythm. Pulmonary:      Effort: Pulmonary effort is normal.      Breath sounds: Normal breath sounds. Abdominal:      General: Bowel sounds are normal.      Palpations: Abdomen is soft. Musculoskeletal: Normal range of motion. General: Tenderness present. No deformity or signs of injury. Comments: Knee with no redness, not warm to the touch, diffuse tenderness to the anterior area worse with flexion. Calf is soft no swelling about the ankle ligaments are grossly stable   Skin:     General: Skin is warm. Neurological:      Mental Status: She is alert and oriented to person, place, and time.           [facility]  Number of Diagnoses or Management Options  Diagnosis management comments: Right knee x-rays show advanced arthritis  will place on Mobic and refer to orthopedics       Amount and/or Complexity of Data Reviewed  Tests in the radiology section of CPT®: ordered and reviewed  Review and summarize past medical records: yes    Risk of Complications, Morbidity, and/or Mortality  Presenting problems: low  Diagnostic procedures: low  Management options: low    Patient Progress  Patient progress: improved         Procedures

## 2020-05-12 NOTE — ED NOTES
I have reviewed discharge instructions with the patient. The patient verbalized understanding. Patient left ED via Discharge Method: ambulatory to Home with self  Opportunity for questions and clarification provided. Patient given 2 scripts. No e-sign. To continue your aftercare when you leave the hospital, you may receive an automated call from our care team to check in on how you are doing. This is a free service and part of our promise to provide the best care and service to meet your aftercare needs.  If you have questions, or wish to unsubscribe from this service please call [phone]. Thank you for Choosing our [facility] Emergency Department.

## 2020-05-12 NOTE — ED TRIAGE NOTES
Pt states she has had right knee pain for the lastt 3-4 days. States the pain got worse last night and today it hurts to put weight on it. Denies trauma or injury. No numbness or tingling in right foot. No swelling to right knee. No hx of blood clots.

## 2021-03-13 ENCOUNTER — APPOINTMENT (OUTPATIENT)
Dept: GENERAL RADIOLOGY | Age: 66
End: 2021-03-13
Attending: EMERGENCY MEDICINE
Payer: MEDICARE

## 2021-03-13 ENCOUNTER — APPOINTMENT (OUTPATIENT)
Dept: MRI IMAGING | Age: 66
End: 2021-03-13
Attending: FAMILY MEDICINE
Payer: MEDICARE

## 2021-03-13 ENCOUNTER — APPOINTMENT (OUTPATIENT)
Dept: CT IMAGING | Age: 66
End: 2021-03-13
Attending: EMERGENCY MEDICINE
Payer: MEDICARE

## 2021-03-13 ENCOUNTER — HOSPITAL ENCOUNTER (OUTPATIENT)
Age: 66
Discharge: HOME | End: 2021-03-15
Attending: EMERGENCY MEDICINE | Admitting: FAMILY MEDICINE
Payer: MEDICARE

## 2021-03-13 ENCOUNTER — HOSPITAL ENCOUNTER (EMERGENCY)
Dept: CT IMAGING | Age: 66
Discharge: HOME | End: 2021-03-13
Attending: EMERGENCY MEDICINE
Payer: MEDICARE

## 2021-03-13 DIAGNOSIS — R20.0 LEFT SIDED NUMBNESS: Primary | ICD-10-CM

## 2021-03-13 PROBLEM — E78.5 HYPERLIPIDEMIA: Status: ACTIVE | Noted: 2021-03-13

## 2021-03-13 PROBLEM — R25.1 TREMOR: Status: ACTIVE | Noted: 2021-03-13

## 2021-03-13 PROBLEM — F41.9 ANXIETY: Status: ACTIVE | Noted: 2021-03-13

## 2021-03-13 PROBLEM — R47.81 SLURRED SPEECH: Status: ACTIVE | Noted: 2021-03-13

## 2021-03-13 PROBLEM — I10 HTN (HYPERTENSION): Status: ACTIVE | Noted: 2021-03-13

## 2021-03-13 LAB
ALBUMIN SERPL-MCNC: 3.4 G/DL (ref 3.2–4.6)
ALBUMIN/GLOB SERPL: 1.1 {RATIO} (ref 1.2–3.5)
ALP SERPL-CCNC: 95 U/L (ref 50–136)
ALT SERPL-CCNC: 28 U/L (ref 12–65)
ANION GAP SERPL CALC-SCNC: 1 MMOL/L (ref 7–16)
APPEARANCE UR: CLEAR
AST SERPL-CCNC: 34 U/L (ref 15–37)
ATRIAL RATE: 187 BPM
BASOPHILS # BLD: 0.1 K/UL (ref 0–0.2)
BASOPHILS NFR BLD MANUAL: 1 % (ref 0–2)
BILIRUB SERPL-MCNC: 0.3 MG/DL (ref 0.2–1.1)
BILIRUB UR QL: NEGATIVE
BUN SERPL-MCNC: 18 MG/DL (ref 8–23)
CALCIUM SERPL-MCNC: 9.1 MG/DL (ref 8.3–10.4)
CALCULATED P AXIS, ECG09: 60 DEGREES
CALCULATED R AXIS, ECG10: -25 DEGREES
CALCULATED T AXIS, ECG11: 53 DEGREES
CHLORIDE SERPL-SCNC: 111 MMOL/L (ref 98–107)
CO2 SERPL-SCNC: 30 MMOL/L (ref 21–32)
COLOR UR: YELLOW
CREAT SERPL-MCNC: 0.96 MG/DL (ref 0.6–1)
DIAGNOSIS, 93000: NORMAL
DIFFERENTIAL METHOD BLD: ABNORMAL
EOSINOPHIL # BLD: 0.2 K/UL (ref 0–0.8)
EOSINOPHIL NFR BLD MANUAL: 2 % (ref 1–8)
ERYTHROCYTE [DISTWIDTH] IN BLOOD BY AUTOMATED COUNT: 12.7 % (ref 11.9–14.6)
GLOBULIN SER CALC-MCNC: 3.2 G/DL (ref 2.3–3.5)
GLUCOSE BLD STRIP.AUTO-MCNC: 87 MG/DL (ref 65–100)
GLUCOSE SERPL-MCNC: 91 MG/DL (ref 65–100)
GLUCOSE UR STRIP.AUTO-MCNC: NEGATIVE MG/DL
HCT VFR BLD AUTO: 37 % (ref 35.8–46.3)
HGB BLD-MCNC: 12.4 G/DL (ref 11.7–15.4)
HGB UR QL STRIP: NEGATIVE
INR PPP: 0.9
KETONES UR QL STRIP.AUTO: NEGATIVE MG/DL
LEUKOCYTE ESTERASE UR QL STRIP.AUTO: NEGATIVE
LYMPHOCYTES # BLD: 4.7 K/UL (ref 0.5–4.6)
LYMPHOCYTES NFR BLD MANUAL: 52 % (ref 16–44)
MAGNESIUM SERPL-MCNC: 2.1 MG/DL (ref 1.8–2.4)
MCH RBC QN AUTO: 31.6 PG (ref 26.1–32.9)
MCHC RBC AUTO-ENTMCNC: 33.5 G/DL (ref 31.4–35)
MCV RBC AUTO: 94.4 FL (ref 79.6–97.8)
MONOCYTES # BLD: 0.4 K/UL (ref 0.1–1.3)
MONOCYTES NFR BLD MANUAL: 4 % (ref 3–9)
NEUTS SEG # BLD: 3.8 K/UL (ref 1.7–8.2)
NEUTS SEG NFR BLD MANUAL: 41 % (ref 47–75)
NITRITE UR QL STRIP.AUTO: NEGATIVE
NRBC # BLD: 0 K/UL (ref 0–0.2)
PH UR STRIP: 5.5 [PH] (ref 5–9)
PLATELET # BLD AUTO: 191 K/UL (ref 150–450)
PLATELET COMMENTS,PCOM: ADEQUATE
PMV BLD AUTO: 10.5 FL (ref 9.4–12.3)
POTASSIUM SERPL-SCNC: 4.1 MMOL/L (ref 3.5–5.1)
PROT SERPL-MCNC: 6.6 G/DL (ref 6.3–8.2)
PROT UR STRIP-MCNC: NEGATIVE MG/DL
PROTHROMBIN TIME: 12.9 SEC (ref 12.5–14.7)
Q-T INTERVAL, ECG07: 454 MS
QRS DURATION, ECG06: 88 MS
QTC CALCULATION (BEZET), ECG08: 445 MS
RBC # BLD AUTO: 3.92 M/UL (ref 4.05–5.2)
RBC MORPH BLD: ABNORMAL
SODIUM SERPL-SCNC: 142 MMOL/L (ref 136–145)
SP GR UR REFRACTOMETRY: 1.03 (ref 1–1.02)
UROBILINOGEN UR QL STRIP.AUTO: 0.2 EU/DL (ref 0.2–1)
VENTRICULAR RATE, ECG03: 58 BPM
WBC # BLD AUTO: 9.2 K/UL (ref 4.3–11.1)

## 2021-03-13 PROCEDURE — 70498 CT ANGIOGRAPHY NECK: CPT

## 2021-03-13 PROCEDURE — 81003 URINALYSIS AUTO W/O SCOPE: CPT

## 2021-03-13 PROCEDURE — 74011250636 HC RX REV CODE- 250/636: Performed by: FAMILY MEDICINE

## 2021-03-13 PROCEDURE — 2709999900 HC NON-CHARGEABLE SUPPLY

## 2021-03-13 PROCEDURE — 80053 COMPREHEN METABOLIC PANEL: CPT

## 2021-03-13 PROCEDURE — 96375 TX/PRO/DX INJ NEW DRUG ADDON: CPT

## 2021-03-13 PROCEDURE — 83735 ASSAY OF MAGNESIUM: CPT

## 2021-03-13 PROCEDURE — 93005 ELECTROCARDIOGRAM TRACING: CPT | Performed by: EMERGENCY MEDICINE

## 2021-03-13 PROCEDURE — 85025 COMPLETE CBC W/AUTO DIFF WBC: CPT

## 2021-03-13 PROCEDURE — 0042T CT PERF W CBF: CPT

## 2021-03-13 PROCEDURE — 74011250636 HC RX REV CODE- 250/636: Performed by: EMERGENCY MEDICINE

## 2021-03-13 PROCEDURE — 96374 THER/PROPH/DIAG INJ IV PUSH: CPT

## 2021-03-13 PROCEDURE — 74011250637 HC RX REV CODE- 250/637: Performed by: FAMILY MEDICINE

## 2021-03-13 PROCEDURE — 85610 PROTHROMBIN TIME: CPT

## 2021-03-13 PROCEDURE — 74011000636 HC RX REV CODE- 636: Performed by: EMERGENCY MEDICINE

## 2021-03-13 PROCEDURE — 70551 MRI BRAIN STEM W/O DYE: CPT

## 2021-03-13 PROCEDURE — 82962 GLUCOSE BLOOD TEST: CPT

## 2021-03-13 PROCEDURE — 70450 CT HEAD/BRAIN W/O DYE: CPT

## 2021-03-13 PROCEDURE — 65270000029 HC RM PRIVATE

## 2021-03-13 PROCEDURE — 71045 X-RAY EXAM CHEST 1 VIEW: CPT

## 2021-03-13 PROCEDURE — 74011000258 HC RX REV CODE- 258: Performed by: EMERGENCY MEDICINE

## 2021-03-13 PROCEDURE — 99285 EMERGENCY DEPT VISIT HI MDM: CPT

## 2021-03-13 RX ORDER — SODIUM CHLORIDE 0.9 % (FLUSH) 0.9 %
10 SYRINGE (ML) INJECTION
Status: COMPLETED | OUTPATIENT
Start: 2021-03-13 | End: 2021-03-13

## 2021-03-13 RX ORDER — ASPIRIN 325 MG
325 TABLET ORAL
Status: COMPLETED | OUTPATIENT
Start: 2021-03-13 | End: 2021-03-13

## 2021-03-13 RX ORDER — PROCHLORPERAZINE EDISYLATE 5 MG/ML
10 INJECTION INTRAMUSCULAR; INTRAVENOUS
Status: COMPLETED | OUTPATIENT
Start: 2021-03-13 | End: 2021-03-13

## 2021-03-13 RX ORDER — DIPHENHYDRAMINE HYDROCHLORIDE 50 MG/ML
25 INJECTION, SOLUTION INTRAMUSCULAR; INTRAVENOUS
Status: COMPLETED | OUTPATIENT
Start: 2021-03-13 | End: 2021-03-13

## 2021-03-13 RX ORDER — FLUOXETINE 10 MG/1
20 CAPSULE ORAL DAILY
Status: DISCONTINUED | OUTPATIENT
Start: 2021-03-14 | End: 2021-03-15 | Stop reason: HOSPADM

## 2021-03-13 RX ORDER — ACETAMINOPHEN 325 MG/1
650 TABLET ORAL
Status: DISCONTINUED | OUTPATIENT
Start: 2021-03-13 | End: 2021-03-15 | Stop reason: HOSPADM

## 2021-03-13 RX ORDER — SODIUM CHLORIDE 0.9 % (FLUSH) 0.9 %
5-40 SYRINGE (ML) INJECTION EVERY 8 HOURS
Status: DISCONTINUED | OUTPATIENT
Start: 2021-03-13 | End: 2021-03-15 | Stop reason: HOSPADM

## 2021-03-13 RX ORDER — LABETALOL HYDROCHLORIDE 5 MG/ML
5 INJECTION, SOLUTION INTRAVENOUS
Status: DISCONTINUED | OUTPATIENT
Start: 2021-03-13 | End: 2021-03-15 | Stop reason: HOSPADM

## 2021-03-13 RX ORDER — GUAIFENESIN 100 MG/5ML
81 LIQUID (ML) ORAL DAILY
Status: DISCONTINUED | OUTPATIENT
Start: 2021-03-14 | End: 2021-03-15 | Stop reason: HOSPADM

## 2021-03-13 RX ORDER — HEPARIN SODIUM 5000 [USP'U]/ML
5000 INJECTION, SOLUTION INTRAVENOUS; SUBCUTANEOUS EVERY 8 HOURS
Status: DISCONTINUED | OUTPATIENT
Start: 2021-03-13 | End: 2021-03-15 | Stop reason: HOSPADM

## 2021-03-13 RX ORDER — KETOROLAC TROMETHAMINE 30 MG/ML
15 INJECTION, SOLUTION INTRAMUSCULAR; INTRAVENOUS
Status: COMPLETED | OUTPATIENT
Start: 2021-03-13 | End: 2021-03-13

## 2021-03-13 RX ORDER — SODIUM CHLORIDE 0.9 % (FLUSH) 0.9 %
5-40 SYRINGE (ML) INJECTION AS NEEDED
Status: DISCONTINUED | OUTPATIENT
Start: 2021-03-13 | End: 2021-03-15 | Stop reason: HOSPADM

## 2021-03-13 RX ORDER — PROPRANOLOL HYDROCHLORIDE 10 MG/1
20 TABLET ORAL DAILY
Status: DISCONTINUED | OUTPATIENT
Start: 2021-03-14 | End: 2021-03-15 | Stop reason: HOSPADM

## 2021-03-13 RX ORDER — ATORVASTATIN CALCIUM 80 MG/1
80 TABLET, FILM COATED ORAL DAILY
Status: DISCONTINUED | OUTPATIENT
Start: 2021-03-14 | End: 2021-03-15 | Stop reason: HOSPADM

## 2021-03-13 RX ORDER — SODIUM CHLORIDE 9 MG/ML
75 INJECTION, SOLUTION INTRAVENOUS CONTINUOUS
Status: DISCONTINUED | OUTPATIENT
Start: 2021-03-13 | End: 2021-03-15 | Stop reason: HOSPADM

## 2021-03-13 RX ADMIN — KETOROLAC TROMETHAMINE 15 MG: 30 INJECTION, SOLUTION INTRAMUSCULAR at 16:34

## 2021-03-13 RX ADMIN — DIPHENHYDRAMINE HYDROCHLORIDE 25 MG: 50 INJECTION, SOLUTION INTRAMUSCULAR; INTRAVENOUS at 16:35

## 2021-03-13 RX ADMIN — HEPARIN SODIUM 5000 UNITS: 5000 INJECTION INTRAVENOUS; SUBCUTANEOUS at 18:39

## 2021-03-13 RX ADMIN — SODIUM CHLORIDE 75 ML/HR: 900 INJECTION, SOLUTION INTRAVENOUS at 18:39

## 2021-03-13 RX ADMIN — SODIUM CHLORIDE 100 ML: 900 INJECTION, SOLUTION INTRAVENOUS at 15:54

## 2021-03-13 RX ADMIN — IOPAMIDOL 50 ML: 755 INJECTION, SOLUTION INTRAVENOUS at 15:54

## 2021-03-13 RX ADMIN — ASPIRIN 325 MG ORAL TABLET 325 MG: 325 PILL ORAL at 16:55

## 2021-03-13 RX ADMIN — Medication 10 ML: at 21:21

## 2021-03-13 RX ADMIN — PROCHLORPERAZINE EDISYLATE 10 MG: 5 INJECTION INTRAMUSCULAR; INTRAVENOUS at 16:35

## 2021-03-13 RX ADMIN — Medication 10 ML: at 15:54

## 2021-03-13 RX ADMIN — Medication 10 ML: at 17:11

## 2021-03-13 NOTE — H&P
[facility]   History and Physical       Name:  Betzy Ritchie  Age:65 y.o. Sex:female   :  1955    MRN:  [id number]   PCP:  Jesse Bello MD      Admit Date:  3/13/2021  2:51 PM   Chief Complaint: slurred speech, left facial droop, left upper extremity and facial numbness    Reason for Admission:   Left sided numbness [R20.0]  Slurred speech [R47.81]    Assessment & Plan:   Slurred speech,?facial droop ,left upper extremity and facial numbness--tia vs cva-- - MRI brain, echo, cont asa and statin. Chronic numbness left lateral aspect of leg-secondary to back surgery. Tele Neuro recommending psychiatric evaluation if MRI brain normal.  Anxiety  Tremor - says upper ext- chronic- cont propranolol  HTN- cont permissive bp. Disposition: home  Diet: DIET CARDIAC  VTE ppx: Heparin  GI ppx:   CODE STATUS: Full Code  Surrogate decision-maker:       History of Presenting Illness:     Jaxon Hassan is a 77 y.o. female with medical history of  Anxiety,  hyperlipidemia, back surgery- chronic left lateral leg numbness and hyperlipidemia. Pt and  were having lunch, pt started noticing funny feeling/numbness left facial and later on left upper extremity. They did improve a bit after few minutes.  says he noticed slurred speech and left facial droop. Pt in ER was c/o headache for which she got Toradol. Pt otherwise didnt c/o chest pain or shortness of breath or vision problems. No problem with gait. Code S was called. Ct head normal  cta head-prelim-Neck: mild left ICA origin stenosis   Head: No LVO   ekg normal simus- can see p waves lead 3    Pt will admitted for further work up of left facial  And left upper extremity numbness. ? Slurred speech        Review of Systems:  A 14 point review of systems was taken and pertinent positive as per HPI.         Past Medical History:   Diagnosis Date    Anxiety     Depression     GERD (gastroesophageal reflux disease)  HTN (hypertension)     Insomnia     Panic attack     Tremors of nervous system        Past Surgical History:   Procedure Laterality Date    HX APPENDECTOMY      HX CARPAL TUNNEL RELEASE      bilateral    HX LITHOTRIPSY      HX ORTHOPAEDIC      lower back    HX OTHER SURGICAL      partial bowel removal as child    HX TUBAL LIGATION         Family History : reviewed  No family history on file. Social History     Tobacco Use    Smoking status: Former Smoker     Quit date: 3/29/2013     Years since quittin.9    Smokeless tobacco: Never Used   Substance Use Topics    Alcohol use: Yes     Comment: rare       Allergies   Allergen Reactions    Ambien [Zolpidem] Other (comments)     hallucinations         There is no immunization history on file for this patient. PTA Medications:  Current Outpatient Medications   Medication Instructions    atorvastatin (LIPITOR) 80 mg, Oral, DAILY    ezetimibe (ZETIA) 10 mg, Oral, DAILY    FLUoxetine (PROZAC) 20 mg, Oral, DAILY    lidocaine (LIDODERM) 5 % Apply patch to the affected area for 12 hours a day and remove for 12 hours a day.  omeprazole (PRILOSEC) 40 mg, Oral    propranoloL (INDERAL) 20 mg, Oral    ramipriL (ALTACE) 10 mg, Oral, DAILY       Objective:     Patient Vitals for the past 24 hrs:   Temp Pulse Resp BP SpO2   21 1701 -- 61 18 131/64 97 %   21 1631 -- (!) 57 17 131/61 97 %   21 1619 -- 60 26 (!) 157/69 100 %   21 1513 -- (!) 59 18 (!) 156/64 98 %   21 1446 98.5 °F (36.9 °C) (!) 59 16 (!) 161/67 99 %       Oxygen Therapy  O2 Sat (%): 97 % (21 1701)  Pulse via Oximetry: 62 beats per minute (21 1701)  O2 Device: Room air (21 1446)    Body mass index is 28.62 kg/m². Physical Exam:    General: No acute distress, speaking in full sentences, no use of accessory muscles. I didn't notice any facial droop( felt there some droop still). speech a bit heavy? ?slurred  HEENT: Pupils equal and reactive to light and accommodation, oropharynx is clear   Neck: Supple, no lymphadenopathy, no JVD   Lungs: Clear to auscultation bilaterally   Cardiovascular: Regular rate and rhythm with normal S1 and S2   Abdomen: Soft, nontender, nondistended, normoactive bowel sounds   Extremities: No cyanosis clubbing or edema   Neuro: subjective numbness left facial and left 4 th and 5th fingers medial aspect. Chronic numbness left leg- lateral aspect- secondary to back surgery. Psych: Normal mood and affect       Data Reviewed: I have reviewed all labs, meds, and studies. Recent Results (from the past 24 hour(s))   GLUCOSE, POC    Collection Time: 03/13/21  2:50 PM   Result Value Ref Range    Glucose (POC) 87 65 - 100 mg/dL   CBC WITH AUTOMATED DIFF    Collection Time: 03/13/21  2:51 PM   Result Value Ref Range    WBC 9.2 4.3 - 11.1 K/uL    RBC 3.92 (L) 4.05 - 5.2 M/uL    HGB 12.4 11.7 - 15.4 g/dL    HCT 37.0 35.8 - 46.3 %    MCV 94.4 79.6 - 97.8 FL    MCH 31.6 26.1 - 32.9 PG    MCHC 33.5 31.4 - 35.0 g/dL    RDW 12.7 11.9 - 14.6 %    PLATELET 033 317 - 783 K/uL    MPV 10.5 9.4 - 12.3 FL    ABSOLUTE NRBC 0.00 0.0 - 0.2 K/uL    NEUTROPHILS 41 (L) 47 - 75 %    LYMPHOCYTES 52 (H) 16 - 44 %    MONOCYTES 4 3 - 9 %    EOSINOPHILS 2 1 - 8 %    BASOPHILS 1 0 - 2 %    ABS. NEUTROPHILS 3.8 1.7 - 8.2 K/UL    ABS. LYMPHOCYTES 4.7 (H) 0.5 - 4.6 K/UL    ABS. MONOCYTES 0.4 0.1 - 1.3 K/UL    ABS. EOSINOPHILS 0.2 0.0 - 0.8 K/UL    ABS.  BASOPHILS 0.1 0.0 - 0.2 K/UL    RBC COMMENTS NORMOCYTIC/NORMOCHROMIC      PLATELET COMMENTS ADEQUATE      DF MANUAL     METABOLIC PANEL, COMPREHENSIVE    Collection Time: 03/13/21  2:51 PM   Result Value Ref Range    Sodium 142 136 - 145 mmol/L    Potassium 4.1 3.5 - 5.1 mmol/L    Chloride 111 (H) 98 - 107 mmol/L    CO2 30 21 - 32 mmol/L    Anion gap 1 (L) 7 - 16 mmol/L    Glucose 91 65 - 100 mg/dL    BUN 18 8 - 23 MG/DL    Creatinine 0.96 0.6 - 1.0 MG/DL    GFR est AA >60 >60 ml/min/1.73m2    GFR est non-AA >60 >60 ml/min/1.73m2    Calcium 9.1 8.3 - 10.4 MG/DL    Bilirubin, total 0.3 0.2 - 1.1 MG/DL    ALT (SGPT) 28 12 - 65 U/L    AST (SGOT) 34 15 - 37 U/L    Alk.  phosphatase 95 50 - 136 U/L    Protein, total 6.6 6.3 - 8.2 g/dL    Albumin 3.4 3.2 - 4.6 g/dL    Globulin 3.2 2.3 - 3.5 g/dL    A-G Ratio 1.1 (L) 1.2 - 3.5     PROTHROMBIN TIME + INR    Collection Time: 03/13/21  2:51 PM   Result Value Ref Range    Prothrombin time 12.9 12.5 - 14.7 sec    INR 0.9     MAGNESIUM    Collection Time: 03/13/21  2:51 PM   Result Value Ref Range    Magnesium 2.1 1.8 - 2.4 mg/dL   EKG, 12 LEAD, INITIAL    Collection Time: 03/13/21  3:11 PM   Result Value Ref Range    Ventricular Rate 58 BPM    Atrial Rate 187 BPM    QRS Duration 88 ms    Q-T Interval 454 ms    QTC Calculation (Bezet) 445 ms    Calculated P Axis 60 degrees    Calculated R Axis -25 degrees    Calculated T Axis 53 degrees    Diagnosis       !! AGE AND GENDER SPECIFIC ECG ANALYSIS !!  Undetermined rhythm  Low voltage QRS  Cannot rule out Anterior infarct (cited on or before 29-MAR-2015)  Abnormal ECG  When compared with ECG of 29-MAR-2015 18:35,  Current undetermined rhythm precludes rhythm comparison, needs review         EKG Results     Procedure 720 Value Units Date/Time    EKG 12 LEAD INITIAL [[id number]] Collected: 03/13/21 1511    Order Status: Completed Updated: 03/13/21 1603     Ventricular Rate 58 BPM      Atrial Rate 187 BPM      QRS Duration 88 ms      Q-T Interval 454 ms      QTC Calculation (Bezet) 445 ms      Calculated P Axis 60 degrees      Calculated R Axis -25 degrees      Calculated T Axis 53 degrees      Diagnosis --     !! AGE AND GENDER SPECIFIC ECG ANALYSIS !!  Undetermined rhythm  Low voltage QRS  Cannot rule out Anterior infarct (cited on or before 29-MAR-2015)  Abnormal ECG  When compared with ECG of 29-MAR-2015 18:35,  Current undetermined rhythm precludes rhythm comparison, needs review      EKG, 12 LEAD, INITIAL [[id number]] Order Status: Completed           All Micro Results     None          Other Studies:  Ct Perf W Cbf    Result Date: 3/13/2021  EXAMINATION: CT PERFUSION DATE: 3/13/2021 4:03 PM INDICATION: : acute neuro changes, possible LVAO; code stroke; left-sided weakness COMPARISON: Head CT and CT angiogram from the same day TECHNIQUE: CT perfusion of the brain was obtained after the administration of intravenous contrast. Perfusion maps and perfusion analysis output were generated using the [employer] perfusion processing software algorithm. Radiation dose reduction techniques were used for this study: All CT scans performed at this facility use one or all of the following: Automated exposure control, adjustment of the mA and/or kVp according to patient's size, iterative reconstruction. FINDINGS:    Summary maps demonstrate no acute large artery territorial perfusion abnormality or estimated core infarct. Perfusion Output Values: CBF < 30% volume (best correlation with core infarct volume without overcalls): 0 ml (core infarction volume greater than 50 cc associated with poor outcomes) Tmax > 6 seconds: 0 ml Tmax/CBF Mismatch Volume: 0 ml Tmax/CBF Mismatch Ratio: N/A Hypoperfusion Intensity Ratio (Tmax > 10 seconds / Tmax > 6 seconds): 0 (values greater than 0.5 associated with poor outcome) Tmax > 10 seconds Volume: 0 ml (volume greater than 100 mL is associated with poor outcome)     No core infarct. Please note that the determination of patient treatment is not based solely upon imaging factors or calculation values. Management of ischemia is at the discretion of the primary physician and is based upon a combination of clinical and imaging data, along other factors. Xr Chest Port    Result Date: 3/13/2021  EXAM: Single view chest radiograph. INDICATION: Code stroke. COMPARISON: Chest radiograph dated March 29, 2015. FINDINGS: No focal lung consolidation. No pneumothorax. No pleural effusion. The heart is normal in size. No evidence of acute osseous abnormality. 1. No acute cardiopulmonary abnormality. Ct Code Neuro Head Wo Contrast    Result Date: 3/13/2021  EXAM: CT head without contrast. INDICATION: Left-sided weakness, code stroke. COMPARISON: Head CT dated December 09, 2015. Multiple axial images obtained through the brain without intravenous contrast. Radiation dose reduction techniques were used for this study: All CT scans performed at this facility use one or all of the following: Automated exposure control, adjustment of the mA and/or kVp according to patient's size, iterative reconstruction. FINDINGS: No evidence of intracranial mass, hemorrhage, or large territorial infarct. The ventricles are normal in size and position. The basal cisterns are patent. No extra-axial fluid collection or mass effect. The orbital contents are within normal limits. The paranasal sinuses are clear. The mastoid air cells and middle ears are clear. No significant osseous or extracranial soft tissue lesions. 1. No evidence of acute intracranial abnormality.         Medications:  Medications Administered     aspirin tablet 325 mg     Admin Date  03/13/2021 Action  Given Dose  325 mg Route  Oral Administered By  [facility]          diphenhydrAMINE (BENADRYL) injection 25 mg     Admin Date  03/13/2021 Action  Given Dose  25 mg Route  IntraVENous Administered By  [facility]          ketorolac (TORADOL) injection 15 mg     Admin Date  03/13/2021 Action  Given Dose  15 mg Route  IntraVENous Administered By  [employer]ab[facility]          prochlorperazine (COMPAZINE) injection 10 mg     Admin Date  03/13/2021 Action  Given Dose  10 mg Route  IntraVENous Administered By  Terabitz                    Problem List:     Hospital Problems as of 3/13/2021 Never Reviewed          Codes Class Noted - Resolved POA    Slurred speech ICD-10-CM: R47.81  ICD-9-CM: 784.59  3/13/2021 - Present Unknown        Anxiety ICD-10-CM: F41.9  ICD-9-CM: 300.00  3/13/2021 - Present Unknown        Tremor ICD-10-CM: R25.1  ICD-9-CM: 781.0  3/13/2021 - Present Unknown        Hyperlipidemia ICD-10-CM: E78.5  ICD-9-CM: 272.4  3/13/2021 - Present Unknown        Left leg numbness ICD-10-CM: R20.0  ICD-9-CM: 782.0  3/13/2021 - Present Unknown    Overview Signed 3/13/2021  5:18 PM by Linda Vera MD     Chronic from back surgery             * (Principal) Left upper extremity numbness ICD-10-CM: R20.0  ICD-9-CM: 782.0  3/13/2021 - Present Unknown        HTN (hypertension) ICD-10-CM: I10  ICD-9-CM: 401.9  3/13/2021 - Present Unknown                   Signed By: Nilsa Gómez MD   [facility] Hospitalist Service    March 13, 2021  5:11 PM

## 2021-03-13 NOTE — ED TRIAGE NOTES
Patient ambulatory to triage with mask in place. Patient reports a sudden onset of left sided facial droop and slurred speech that started around 1330. Pt reports decreased sensation to LUE and face. Pt also reports headache.

## 2021-03-13 NOTE — ED PROVIDER NOTES
Flower Stewart is a 77 y.o. female seen on 3/13/2021 in the [facility] EMERGENCY DEPT in room ER10/10. Chief Complaint   Patient presents with    Dysarthria     HPI: 47-year-old female presented to the emergency department with complaints of left-sided tingling, left facial droop and slurred speech that began at 1330 today. Patient states that she was having with her  when she noticed that she was unable to chew the right way. She also could not get her words out clearly to her . This is improved somewhat but still has some mild slurring of her words. Patient with mild facial droop and decreased sensation of her lower and upper left extremity. Patient states that she has had similar symptoms previously before. Patient denies any headache, fever, chills, nausea, vomiting, diarrhea or any other concerns. Patient states that she always has decreased sensation of her left lower extremity secondary to back problems. Historian: Patient    REVIEW OF SYSTEMS     Review of Systems   Constitutional: Negative. HENT: Negative. Respiratory: Negative. Cardiovascular: Negative. Gastrointestinal: Negative. Genitourinary: Negative. Musculoskeletal: Negative. Skin: Negative. Neurological: Positive for facial asymmetry, speech difficulty and numbness. Hematological: Negative. Psychiatric/Behavioral: Negative. All other systems reviewed and are negative.       PAST MEDICAL HISTORY     Past Medical History:   Diagnosis Date    Anxiety     Depression     GERD (gastroesophageal reflux disease)     HTN (hypertension)     Insomnia     Panic attack     Tremors of nervous system      Past Surgical History:   Procedure Laterality Date    HX APPENDECTOMY      HX CARPAL TUNNEL RELEASE      bilateral    HX LITHOTRIPSY      HX ORTHOPAEDIC      lower back    HX OTHER SURGICAL      partial bowel removal as child    HX TUBAL LIGATION       Social History     Socioeconomic History    Marital status:      Spouse name: Not on file    Number of children: Not on file    Years of education: Not on file    Highest education level: Not on file   Tobacco Use    Smoking status: Former Smoker     Quit date: 3/29/2013     Years since quittin.9    Smokeless tobacco: Never Used   Substance and Sexual Activity    Alcohol use: Yes     Comment: rare    Drug use: No     Prior to Admission Medications   Prescriptions Last Dose Informant Patient Reported? Taking? FLUoxetine (PROZAC) 20 mg capsule   Yes No   Sig: Take 20 mg by mouth daily. atorvastatin (LIPITOR) 80 mg tablet   Yes No   Sig: Take 80 mg by mouth daily. diazePAM (VALIUM) 2 mg tablet   No No   Sig: Take 2.5 Tabs by mouth every eight (8) hours as needed for Anxiety. Max Daily Amount: 15 mg.   ezetimibe (ZETIA) 10 mg tablet   Yes No   Sig: Take 10 mg by mouth daily. lamoTRIgine (LAMICTAL) 200 mg tablet   Yes No   Sig: Take 200 mg by mouth daily. lidocaine (LIDODERM) 5 %   No No   Sig: Apply patch to the affected area for 12 hours a day and remove for 12 hours a day. omeprazole (PRILOSEC) 40 mg capsule   Yes No   Sig: Take 40 mg by mouth.   predniSONE (DELTASONE) 10 mg tablet   No No   Sig: Take 6 pills a day for 3 days, then take 4 pills daily for 3 days, then 3 pills daily for 3 days, then 2 pills daily for 3 days, then 1 pill daily for 3 days, then 1/2 pill daily for 4 days. propranolol (INDERAL) 20 mg tablet   Yes No   Sig: Take 20 mg by mouth. ramipril (ALTACE) 10 mg capsule   Yes No   Sig: Take 10 mg by mouth daily. tiZANidine (ZANAFLEX) 4 mg tablet   Yes No   Sig: Take 4 mg by mouth.       Facility-Administered Medications: None     Allergies   Allergen Reactions    Ambien [Zolpidem] Other (comments)     hallucinations        PHYSICAL EXAM       Vitals:    21 1446 21 1513 21 1619   BP: (!) 161/67 (!) 156/64 (!) 157/69   Pulse: (!) 59 (!) 59 60   Resp: 16 18 26   Temp: 98.5 °F (36.9 °C)     SpO2: 99% 98% 100%    Vital signs were reviewed. Physical Exam  Vitals signs and nursing note reviewed. Constitutional:       General: She is not in acute distress. Appearance: Normal appearance. She is not ill-appearing or toxic-appearing. HENT:      Head: Normocephalic and atraumatic. Nose: Nose normal.      Mouth/Throat:      Mouth: Mucous membranes are moist.   Eyes:      Extraocular Movements: Extraocular movements intact. Pupils: Pupils are equal, round, and reactive to light. Neck:      Musculoskeletal: Normal range of motion. Cardiovascular:      Rate and Rhythm: Normal rate and regular rhythm. Pulses: Normal pulses. Heart sounds: Normal heart sounds. Pulmonary:      Effort: Pulmonary effort is normal.      Breath sounds: Normal breath sounds. Abdominal:      Palpations: Abdomen is soft. Tenderness: There is no abdominal tenderness. There is no guarding or rebound. Musculoskeletal: Normal range of motion. Skin:     General: Skin is warm and dry. Neurological:      Mental Status: She is alert and oriented to person, place, and time. Sensory: Sensory deficit (Mild left facial droop) present. Comments: Slight dysarthria. Decreased sensation to the left face, upper and lower extremities   Psychiatric:         Mood and Affect: Mood normal.         Behavior: Behavior normal.         Thought Content:  Thought content normal.         Judgment: Judgment normal.          MEDICAL DECISION MAKING     ED Course:    Recent Results (from the past 8 hour(s))   GLUCOSE, POC    Collection Time: 03/13/21  2:50 PM   Result Value Ref Range    Glucose (POC) 87 65 - 100 mg/dL   CBC WITH AUTOMATED DIFF    Collection Time: 03/13/21  2:51 PM   Result Value Ref Range    WBC 9.2 4.3 - 11.1 K/uL    RBC 3.92 (L) 4.05 - 5.2 M/uL    HGB 12.4 11.7 - 15.4 g/dL    HCT 37.0 35.8 - 46.3 %    MCV 94.4 79.6 - 97.8 FL    MCH 31.6 26.1 - 32.9 PG    MCHC 33.5 31.4 - 35.0 g/dL    RDW 12.7 11.9 - 14.6 %    PLATELET 419 155 - 895 K/uL    MPV 10.5 9.4 - 12.3 FL    ABSOLUTE NRBC 0.00 0.0 - 0.2 K/uL    DF PENDING    METABOLIC PANEL, COMPREHENSIVE    Collection Time: 03/13/21  2:51 PM   Result Value Ref Range    Sodium 142 136 - 145 mmol/L    Potassium 4.1 3.5 - 5.1 mmol/L    Chloride 111 (H) 98 - 107 mmol/L    CO2 30 21 - 32 mmol/L    Anion gap 1 (L) 7 - 16 mmol/L    Glucose 91 65 - 100 mg/dL    BUN 18 8 - 23 MG/DL    Creatinine 0.96 0.6 - 1.0 MG/DL    GFR est AA >60 >60 ml/min/1.73m2    GFR est non-AA >60 >60 ml/min/1.73m2    Calcium 9.1 8.3 - 10.4 MG/DL    Bilirubin, total 0.3 0.2 - 1.1 MG/DL    ALT (SGPT) 28 12 - 65 U/L    AST (SGOT) 34 15 - 37 U/L    Alk.  phosphatase 95 50 - 136 U/L    Protein, total 6.6 6.3 - 8.2 g/dL    Albumin 3.4 3.2 - 4.6 g/dL    Globulin 3.2 2.3 - 3.5 g/dL    A-G Ratio 1.1 (L) 1.2 - 3.5     PROTHROMBIN TIME + INR    Collection Time: 03/13/21  2:51 PM   Result Value Ref Range    Prothrombin time 12.9 12.5 - 14.7 sec    INR 0.9     MAGNESIUM    Collection Time: 03/13/21  2:51 PM   Result Value Ref Range    Magnesium 2.1 1.8 - 2.4 mg/dL   EKG, 12 LEAD, INITIAL    Collection Time: 03/13/21  3:11 PM   Result Value Ref Range    Ventricular Rate 58 BPM    Atrial Rate 187 BPM    QRS Duration 88 ms    Q-T Interval 454 ms    QTC Calculation (Bezet) 445 ms    Calculated P Axis 60 degrees    Calculated R Axis -25 degrees    Calculated T Axis 53 degrees    Diagnosis       !! AGE AND GENDER SPECIFIC ECG ANALYSIS !!  Undetermined rhythm  Low voltage QRS  Cannot rule out Anterior infarct (cited on or before 29-MAR-2015)  Abnormal ECG  When compared with ECG of 29-MAR-2015 18:35,  Current undetermined rhythm precludes rhythm comparison, needs review       Ct Perf W Cbf    Result Date: 3/13/2021  EXAMINATION: CT PERFUSION DATE: 3/13/2021 4:03 PM INDICATION: : acute neuro changes, possible LVAO; code stroke; left-sided weakness COMPARISON: Head CT and CT angiogram from the same day TECHNIQUE: CT perfusion of the brain was obtained after the administration of intravenous contrast. Perfusion maps and perfusion analysis output were generated using the Pan[employer] perfusion processing software algorithm. Radiation dose reduction techniques were used for this study: All CT scans performed at this facility use one or all of the following: Automated exposure control, adjustment of the mA and/or kVp according to patient's size, iterative reconstruction. FINDINGS:    Summary maps demonstrate no acute large artery territorial perfusion abnormality or estimated core infarct. Perfusion Output Values: CBF < 30% volume (best correlation with core infarct volume without overcalls): 0 ml (core infarction volume greater than 50 cc associated with poor outcomes) Tmax > 6 seconds: 0 ml Tmax/CBF Mismatch Volume: 0 ml Tmax/CBF Mismatch Ratio: N/A Hypoperfusion Intensity Ratio (Tmax > 10 seconds / Tmax > 6 seconds): 0 (values greater than 0.5 associated with poor outcome) Tmax > 10 seconds Volume: 0 ml (volume greater than 100 mL is associated with poor outcome)     No core infarct. Please note that the determination of patient treatment is not based solely upon imaging factors or calculation values. Management of ischemia is at the discretion of the primary physician and is based upon a combination of clinical and imaging data, along other factors. Xr Chest Port    Result Date: 3/13/2021  EXAM: Single view chest radiograph. INDICATION: Code stroke. COMPARISON: Chest radiograph dated March 29, 2015. FINDINGS: No focal lung consolidation. No pneumothorax. No pleural effusion. The heart is normal in size. No evidence of acute osseous abnormality. 1. No acute cardiopulmonary abnormality. Ct Code Neuro Head Wo Contrast    Result Date: 3/13/2021  EXAM: CT head without contrast. INDICATION: Left-sided weakness, code stroke. COMPARISON: Head CT dated December 09, 2015. Multiple axial images obtained through the brain without intravenous contrast. Radiation dose reduction techniques were used for this study: All CT scans performed at this facility use one or all of the following: Automated exposure control, adjustment of the mA and/or kVp according to patient's size, iterative reconstruction. FINDINGS: No evidence of intracranial mass, hemorrhage, or large territorial infarct. The ventricles are normal in size and position. The basal cisterns are patent. No extra-axial fluid collection or mass effect. The orbital contents are within normal limits. The paranasal sinuses are clear. The mastoid air cells and middle ears are clear. No significant osseous or extracranial soft tissue lesions. 1. No evidence of acute intracranial abnormality. ED Course as of Mar 13 1629   Sat Mar 13, 2021   1451 Patient evaluated in triage. Code S initiated. [JL]   1524 Discussed with teleneurology. Patient's differential includes CVA versus migraine versus anxiety reaction. Patient's symptoms remain mild. Neurology discussed TPA with patient who did not want to have TPA therapy. Neurology did recommend stat CTA/CT perfusion and if LVO then advised to discuss with interventional.  If no LVO was identified, I recommended patient be admitted to hospital on full dose aspirin and have full MRI work-up with admission to the hospital.    [JL]   1621 No LVO on ED prelim read. CTA CODE NEURO HEAD AND NECK W CONT [JL]   1623 Patient now complaining of HA. Will give migraine cocktail as patients symptoms may be due to atypical migraine. [JL]      ED Course User Index  [JL] Rivka Solano DO       MDM  Number of Diagnoses or Management Options  Left sided numbness  Diagnosis management comments: 56-year-old female presented the emergency department from home with complaints of sudden onset dysarthria and left-sided numbness. Patient evaluated by teleneurology.   TPA is not recommended at this time. Patient's CT/CTA/CTP without acute abnormality or LVO. Patient now complaining of mild headache. Patient to be given migraine cocktail as patient symptoms could be from atypical migraine. Per recommendation of teleneurology, patient will be admitted to the hospital for further treatment evaluation. Patient and hospitalist both agreeable to this plan. Amount and/or Complexity of Data Reviewed  Clinical lab tests: ordered and reviewed  Tests in the radiology section of CPT®: ordered and reviewed  Discuss the patient with other providers: yes  Independent visualization of images, tracings, or specimens: yes    Patient Progress  Patient progress: improved    Critical Care  Performed by: Erna Kumar DO  Authorized by: Erna Kumar DO     Critical care provider statement:     Critical care time (minutes):  34    Critical care was necessary to treat or prevent imminent or life-threatening deterioration of the following conditions:  CNS failure or compromise (Code Stroke)    Critical care was time spent personally by me on the following activities:  Blood draw for specimens, development of treatment plan with patient or surrogate, discussions with consultants, evaluation of patient's response to treatment, examination of patient, obtaining history from patient or surrogate, re-evaluation of patient's condition, pulse oximetry, ordering and review of radiographic studies, ordering and review of laboratory studies and ordering and performing treatments and interventions  Comments:      Patient presented with sudden onset left-sided numbness and facial droop. NIH stroke scale of 3. Patient evaluated by teleneurology. No TPA indicated. CTA/CTP showed no ischemic penumbra or LVO. Disposition:  Admitted  Diagnosis:     ICD-10-CM ICD-9-CM   1.  Left sided numbness  R20.0 782.0     ____________________________________________________________________  A portion of this note was generated using voice recognition dictation software. While the note has been reviewed for accuracy, please note certain words and phrases may not be transcribed as intended and some grammatical and/or typographical errors may be present.

## 2021-03-13 NOTE — ED NOTES
TRANSFER - OUT REPORT:    Verbal report given to receiving RN (name) on Jessenia Carmichael  being transferred to [address] [place] [address] (unit) for routine progression of care       Report consisted of patients Situation, Background, Assessment and   Recommendations(SBAR). Information from the following report(s) ED Summary was reviewed with the receiving nurse. Lines:   Peripheral IV 03/13/21 Left Antecubital (Active)        Opportunity for questions and clarification was provided. Patient transported with:  Belongings     Pt in mri at this time.  Receiving RN will notify this RN when pt is transferred for dual NIH signoff

## 2021-03-13 NOTE — ED NOTES
Ischemic Stroke without Activase/TIA    VTE Prophylaxis: No    Antiplatelet: Yes: aspirin     Statin if LDL Greater Than or Equal to100: No    BP Parameters: Less Than 220/120 for 24 hours, then consult MD for parameters    Controlled With: None    Dysphagia Screen Completed: Yes: Pass  Dysphagia Screening  Vocal Quality/Secretions: Normal  History of Dysphagia: No  O2 Saturation: Normal  Alertness: Normal  Pre-Swallow Assessment Score: 0  Purees: No difficulty noted  Water by Cup: No difficulty noted  Water by Straw: No difficulty noted    Patient has PEG, NG Tube, Feeding Tube: No    Medication orders per above route: Yes    Nutrition Status: PO    NIH Stroke Scale Complete: Yes: 2-3    Frequency of Vital Signs: Every 2 hours     Frequency of Neuro Checks: Every 2 hours    Daily Education/Care Plan Updated: Yes    Max Lapping

## 2021-03-13 NOTE — ED NOTES
Pt brought to ER from MRI. This RN transported pt upstairs to floor via w/c.  Dual [facility] completed with receiving RN at bedside

## 2021-03-13 NOTE — PROGRESS NOTES
Patient is being admitted to floor from ER    Per notes:    presented to the emergency department with complaints of left-sided tingling, left facial droop and slurred speech that began at 1330 today. Patient states that she was having with her  when she noticed that she was unable to chew the right way. She also could not get her words out clearly to her . This is improved somewhat but still has some mild slurring of her words. Patient with mild facial droop and decreased sensation of her lower and upper left extremity. Patient states that she has had similar symptoms previously before.

## 2021-03-14 LAB
CHOLEST SERPL-MCNC: 275 MG/DL
EST. AVERAGE GLUCOSE BLD GHB EST-MCNC: 103 MG/DL
HBA1C MFR BLD: 5.2 % (ref 4.8–6)
HDLC SERPL-MCNC: 35 MG/DL (ref 40–60)
HDLC SERPL: 7.9 {RATIO}
LDLC SERPL CALC-MCNC: ABNORMAL MG/DL
LDLC SERPL DIRECT ASSAY-MCNC: 141 MG/DL
LIPID PROFILE,FLP: ABNORMAL
TRIGL SERPL-MCNC: 500 MG/DL (ref 35–150)
VLDLC SERPL CALC-MCNC: 100 MG/DL (ref 6–23)

## 2021-03-14 PROCEDURE — 74011250637 HC RX REV CODE- 250/637: Performed by: FAMILY MEDICINE

## 2021-03-14 PROCEDURE — 80061 LIPID PANEL: CPT

## 2021-03-14 PROCEDURE — 97161 PT EVAL LOW COMPLEX 20 MIN: CPT

## 2021-03-14 PROCEDURE — 92610 EVALUATE SWALLOWING FUNCTION: CPT

## 2021-03-14 PROCEDURE — 99222 1ST HOSP IP/OBS MODERATE 55: CPT | Performed by: PHYSICAL MEDICINE & REHABILITATION

## 2021-03-14 PROCEDURE — 97530 THERAPEUTIC ACTIVITIES: CPT

## 2021-03-14 PROCEDURE — 83036 HEMOGLOBIN GLYCOSYLATED A1C: CPT

## 2021-03-14 PROCEDURE — 36415 COLL VENOUS BLD VENIPUNCTURE: CPT

## 2021-03-14 PROCEDURE — 93306 TTE W/DOPPLER COMPLETE: CPT

## 2021-03-14 PROCEDURE — 97165 OT EVAL LOW COMPLEX 30 MIN: CPT

## 2021-03-14 PROCEDURE — 83721 ASSAY OF BLOOD LIPOPROTEIN: CPT

## 2021-03-14 PROCEDURE — 74011250636 HC RX REV CODE- 250/636: Performed by: FAMILY MEDICINE

## 2021-03-14 PROCEDURE — 65270000029 HC RM PRIVATE

## 2021-03-14 RX ADMIN — ATORVASTATIN CALCIUM 80 MG: 80 TABLET, FILM COATED ORAL at 08:35

## 2021-03-14 RX ADMIN — PROPRANOLOL HYDROCHLORIDE 20 MG: 10 TABLET ORAL at 08:34

## 2021-03-14 RX ADMIN — ACETAMINOPHEN 650 MG: 325 TABLET ORAL at 22:33

## 2021-03-14 RX ADMIN — HEPARIN SODIUM 5000 UNITS: 5000 INJECTION INTRAVENOUS; SUBCUTANEOUS at 01:03

## 2021-03-14 RX ADMIN — HEPARIN SODIUM 5000 UNITS: 5000 INJECTION INTRAVENOUS; SUBCUTANEOUS at 08:35

## 2021-03-14 RX ADMIN — FLUOXETINE 20 MG: 10 CAPSULE ORAL at 08:34

## 2021-03-14 RX ADMIN — Medication 10 ML: at 11:32

## 2021-03-14 RX ADMIN — Medication 10 ML: at 05:40

## 2021-03-14 RX ADMIN — HEPARIN SODIUM 5000 UNITS: 5000 INJECTION INTRAVENOUS; SUBCUTANEOUS at 16:55

## 2021-03-14 RX ADMIN — Medication 10 ML: at 22:32

## 2021-03-14 RX ADMIN — ASPIRIN 81 MG: 81 TABLET, CHEWABLE ORAL at 08:34

## 2021-03-14 NOTE — PROGRESS NOTES
ACUTE PHYSICAL THERAPY GOALS:  (Developed with and agreed upon by patient and/or caregiver. )  LTG:  (1.)Ms. Ritchie will move from supine to sit and sit to supine, scoot up and down and roll side to side in bed INDEPENDENTLY with bed flat within 7 treatment day(s). (2.)Ms. Ritchie will transfer from bed to chair and chair to bed INDEPENDENTLY within 7 treatment day(s). (3.)Ms. Ritchie will ambulate with SUPERVISION for 450+ feet with the least restrictive device within 7 treatment day(s). ________________________________________________________________________________________________      PHYSICAL THERAPY ASSESSMENT: Initial Assessment, Daily Note, Discharge and AM PT Treatment Day # 1      Kiersten Ritchie is a 77 y.o. female   PRIMARY DIAGNOSIS: Left upper extremity numbness  Left sided numbness [R20.0]  Slurred speech [R47.81]       Reason for Referral:  Left UE weakness, numbness, slurred speech  ICD-10: Treatment Diagnosis: Generalized Muscle Weakness (M62.81)  INPATIENT: Payor: [facility] MEDICARE / Plan: [address] / Product Type: Managed Care Medicare /     ASSESSMENT:     REHAB RECOMMENDATIONS:   Recommendation to date pending progress:  Setting:   No further skilled therapy   Equipment:    None     PRIOR LEVEL OF FUNCTION:  (Prior to Hospitalization) INITIAL/CURRENT LEVEL OF FUNCTION:  (Most Recently Demonstrated)   Bed Mobility:   Independent  Sit to Stand:   Independent  Transfers:   Independent  Gait/Mobility:   Independent Bed Mobility:   Independent  Sit to Stand:   Independent  Transfers:   Independent  Gait/Mobility:   Supervision     ASSESSMENT:  Ms. Duncan Garland presents with transient episode of left sided weakness, numbness, and speech deficits which have resolved per her report. Has chronic left LE weakness and sensory deficits due to spinal stenosis and arthritis.  Presents close to baseline today; performs transfers and mobility/functional activities in room independently. Ambulatory in room and hallway x 450 ft with supervision, no DME, and no major gait deficits. Pt appears to be functioning close to baseline currently; no need for continued therapy during hospital stay or at [place]. SUBJECTIVE:   Ms. Nate Pratt states, \"I'm doing fine. \"    SOCIAL HISTORY/LIVING ENVIRONMENT: Lives with . Independent to mod I using walker in community more due to spinal stenosis and prior back surgeries with residual left LE weakness and inability to stand for long periods of time. No recent falls.    Home Environment: Private residence  # Steps to Enter: 0  One/Two Story Residence: One story  Living Alone: No  Support Systems: Spouse/Significant Other/Partner  OBJECTIVE:     PAIN: VITAL SIGNS: LINES/DRAINS:   Pre Treatment: Pain Screen  Pain Scale 1: Numeric (0 - 10)  Pain Intensity 1: 0  Post Treatment: 0/10 Vital Signs  O2 Device: Room air none  O2 Device: Room air     GROSS EVALUATION:   Within Functional Limits Abnormal/ Functional Abnormal/ Non-Functional (see comments) Not Tested Comments:   AROM [x] [] [] []    PROM [] [] [] []    Strength [] [x] [] []    Balance [x] [] [] []    Posture [x] [] [] []    Sensation [] [] [] []    Coordination [] [] [] []    Tone [] [] [] []    Edema [] [] [] []    Activity Tolerance [x] [] [] []     [] [] [] []      COGNITION/  PERCEPTION: Intact Impaired   (see comments) Comments:   Orientation [x] []    Vision [x] []    Hearing [x] []    Command Following [x] []    Safety Awareness [x] []     [] []      MOBILITY: I Mod I S SBA CGA Min Mod Max Total  NT x2 Comments:   Bed Mobility    Rolling [x] [] [] [] [] [] [] [] [] [] []    Supine to Sit [x] [] [] [] [] [] [] [] [] [] []    Scooting [x] [] [] [] [] [] [] [] [] [] []    Sit to Supine [] [] [] [] [] [] [] [] [] [] []    Transfers    Sit to Stand [x] [] [] [] [] [] [] [] [] [] []    Bed to Chair [x] [] [] [] [] [] [] [] [] [] []    Stand to Sit [x] [] [] [] [] [] [] [] [] [] []    I=Independent, Mod I=Modified Independent, S=Supervision, SBA=Standby Assistance, CGA=Contact Guard Assistance,   Min=Minimal Assistance, Mod=Moderate Assistance, Max=Maximal Assistance, Total=Total Assistance, NT=Not Tested  GAIT: I Mod I S SBA CGA Min Mod Max Total  NT x2 Comments:   Level of Assistance [] [] [x] [] [] [] [] [] [] [] []    Distance 450 ft    DME None    Gait Quality Mild path deviations, no LOB    Weightbearing Status N/A     I=Independent, Mod I=Modified Independent, S=Supervision, SBA=Standby Assistance, CGA=Contact Guard Assistance,   Min=Minimal Assistance, Mod=Moderate Assistance, Max=Maximal Assistance, Total=Total Assistance, NT=Not Tested    [facility] [employer] AM-PAC [facility] Form       How much difficulty does the patient currently have. .. Unable A Lot A Little None   1. Turning over in bed (including adjusting bedclothes, sheets and blankets)? [] 1   [] 2   [] 3   [x] 4   2. Sitting down on and standing up from a chair with arms ( e.g., wheelchair, bedside commode, etc.)   [] 1   [] 2   [] 3   [x] 4   3. Moving from lying on back to sitting on the side of the bed? [] 1   [] 2   [] 3   [x] 4   How much help from another person does the patient currently need. .. Total A Lot A Little None   4. Moving to and from a bed to a chair (including a wheelchair)? [] 1   [] 2   [] 3   [x] 4   5. Need to walk in hospital room? [] 1   [] 2   [x] 3   [] 4   6. Climbing 3-5 steps with a railing? [] 1   [] 2   [x] 3   [] 4   © 2007, Trustees of [facility] [employer], under license to [employer]. All rights reserved     Score:  Initial: 22 Most Recent: X (Date: -- )    Interpretation of Tool:  Represents activities that are increasingly more difficult (i.e. Bed mobility, Transfers, Gait).     PLAN:   FREQUENCY/DURATION: PT Plan of Care: (eval and dc) for duration of hospital stay or until stated goals are met, whichever comes first.    PROBLEM LIST:   (Skilled intervention is medically necessary to address:)  1. Decreased Balance   INTERVENTIONS PLANNED:   (Benefits and precautions of physical therapy have been discussed with the patient.)  1. none further; eval and dc     TREATMENT:     EVALUATION: Low Complexity : (Untimed Charge)    TREATMENT:   ($$ Therapeutic Activity: 8-22 mins    )  Therapeutic Activity (8 Minutes): Therapeutic activity included Rolling, Supine to Sit, Scooting, Transfer Training, Ambulation on level ground, Sitting balance , Standing balance and toilet transfers, standing static/dynamic activities to improve functional Mobility, Strength, Activity tolerance and balance.     TREATMENT GRID:  N/A    AFTER TREATMENT POSITION/PRECAUTIONS:  Chair, Needs within reach and RN notified    INTERDISCIPLINARY COLLABORATION:  RN/PCT and PT/PTA    TOTAL TREATMENT DURATION:  PT Patient Time In/Time Out  Time In: 9857  Time Out: 535 [address]

## 2021-03-14 NOTE — PROGRESS NOTES
SPEECH LANGUAGE PATHOLOGY: DYSPHAGIA- Initial Assessment and Discharge    NAME/AGE/GENDER: Omar Min is a 77 y.o. female  DATE: 3/14/2021  PRIMARY DIAGNOSIS: Left sided numbness [R20.0]  Slurred speech [R47.81]      ICD-10: Treatment Diagnosis: R13.12 Dysphagia, Oropharyngeal Phase    RECOMMENDATIONS   DIET:    Regular Consistency   Thin Liquids    MEDICATIONS: With liquid     ASPIRATION PRECAUTIONS  · Slow rate of intake  · Small bites/sips  · Upright at 90 degrees during meal     COMPENSATORY STRATEGIES/MODIFICATIONS  · None     RECOMMENDATIONS for CONTINUED SPEECH THERAPY:   No further speech therapy indicated at this time. ASSESSMENT   Patient presents with oropharyngeal swallow function that is within normal limits. No s/sx of dysphagia identified. Patient denotes episode of aphasia in January in which she did not seek medical attention. Education regarding importance of seeking medical care immediately upon experiencing neurological change. Recommend continue regular diet/thin liquids. EDUCATION:  · Recommendations discussed with Patient and RN  CONTINUATION OF SKILLED SERVICES/MEDICAL NECESSITY:   No additional speech services warranted. PLAN    FREQUENCY/DURATION: No further speech therapy indicated at this time as oropharyngeal swallow function is within normal limits. - Recommendations for next treatment session: No additional speech therapy indicated at this time. SUBJECTIVE   Pleasant       Pain: Pain Scale 1: Numeric (0 - 10)  Pain Intensity 1: 0    History of Present Injury/Illness: Ms. Jacki Barrios  has a past medical history of Anxiety, Depression, GERD (gastroesophageal reflux disease), HTN (hypertension), Insomnia, Panic attack, and Tremors of nervous system. . She also  has a past surgical history that includes hx other surgical; hx appendectomy; hx lithotripsy; hx tubal ligation; hx orthopaedic; and hx carpal tunnel release.  PRECAUTIONS/ALLERGIES: Ambien [zolpidem]     Problem List:  (Impairments causing functional limitations):  1. TIA    Previous Dysphagia: NONE REPORTED  Diet Prior to Evaluation: regular/thin    Orientation:  Person  Place  Time  Situation    Cognitive-Linguistic Screening:   Alertness  o Alert   Speech Production:   o Fully intelligible   Expressive Language:  o Fluent speech   Receptive Language:  o Answers yes/no questions appropriately and Follows commands   Cognition:   o WNL  Prior Level of Function: independent  Recommendations: Given results of screening, patient appears to be functioning at baseline. No acute assessment of speech, language, or cognition warranted. OBJECTIVE   Oral Motor:   · Labial: No impairment  · Dentition: Intact  · Oral Hygiene: Adequate  · Lingual: No impairment    Dysphagia evaluation:   Patient consumed trials of thin liquids via cup and straw sip, applesauce, mixed consistency fruit and cracker. Adequate mastication and oral clearance observed. No overt s/sx and single swallow per bolus provided. Did have coughing episode with numerous pills taken at once. Encouraged to take less at a time.      Tool Used: Dysphagia Outcome and Severity Scale (BROOKE)    Score Comments   Normal Diet  [] 7 With no strategies or extra time needed   Functional Swallow  [x] 6 May have mild oral or pharyngeal delay   Mild Dysphagia  [] 5 Which may require one diet consistency restricted    Mild-Moderate Dysphagia  [] 4 With 1-2 diet consistencies restricted   Moderate Dysphagia  [] 3 With 2 or more diet consistencies restricted   Moderate-Severe Dysphagia  [] 2 With partial PO strategies (trials with ST only)   Severe Dysphagia  [] 1 With inability to tolerate any PO safely      Score:  Initial: 6 Most Recent: x (Date 03/14/21 )   Interpretation of Tool: The Dysphagia Outcome and Severity Scale (BROOKE) is a simple, easy-to-use, 7-point scale developed to systematically rate the functional severity of dysphagia based on objective assessment and make recommendations for diet level, independence level, and type of nutrition. Current Medications:   No current facility-administered medications on file prior to encounter. Current Outpatient Medications on File Prior to Encounter   Medication Sig Dispense Refill    lidocaine (LIDODERM) 5 % Apply patch to the affected area for 12 hours a day and remove for 12 hours a day. 4 Each 0    omeprazole (PRILOSEC) 40 mg capsule Take 40 mg by mouth.  propranolol (INDERAL) 20 mg tablet Take 20 mg by mouth.  ezetimibe (ZETIA) 10 mg tablet Take 10 mg by mouth daily.  ramipril (ALTACE) 10 mg capsule Take 10 mg by mouth daily.  FLUoxetine (PROZAC) 20 mg capsule Take 20 mg by mouth daily.  atorvastatin (LIPITOR) 80 mg tablet Take 80 mg by mouth daily. INTERDISCIPLINARY COLLABORATION: RN    After treatment position/precautions:  · Upright in bed  · RN notified    Total Treatment Duration:   Time In: 0825  Time Out: [address] [facility],[address].  MS Camron, CCC-SLP Statement Selected

## 2021-03-14 NOTE — PROGRESS NOTES
03/14/21 4795   NIH Stroke Scale   Interval Other (comment)  (dual with guy)   LOC 0   LOC Questions 0   LOC Commands 0   Best Gaze 0   Visual 0   Facial Palsy 0   Motor Right Arm 0   Motor Left Arm 0   Motor Right Leg 0   Motor Left Leg 0   Limb Ataxia 0   Sensory 0   Best Language 0   Dysarthria 0   Extinction and Inattention 0   Total 0   dual with Roberto Handy

## 2021-03-14 NOTE — PROGRESS NOTES
OCCUPATIONAL THERAPY ASSESSMENT: Initial Assessment and Discharge OT Treatment Day # 1    Salvador Mcneal is a 77 y.o. female   PRIMARY DIAGNOSIS: Left upper extremity numbness  Left sided numbness [R20.0]  Slurred speech [R47.81]       Reason for Referral:    ICD-10: Treatment Diagnosis: Generalized Muscle Weakness (M62.81)  INPATIENT: Payor: [employer] MEDICARE / Plan: [address] / Product Type: Managed Care Medicare /   ASSESSMENT:     REHAB RECOMMENDATIONS:   Recommendation to date pending progress:  Setting:   No further skilled therapy   Equipment:    None     PRIOR LEVEL OF FUNCTION:  (Prior to Hospitalization)  INITIAL/CURRENT LEVEL OF FUNCTION:  (Based on today's evaluation)   Bathing:   Independent  Dressing:   Modified Independent  Feeding/Grooming:   Independent  Toileting:   Independent  Functional Mobility:   Modified Independent Bathing:   Independent  Dressing:   Modified Independent  Feeding/Grooming:   Independent  Toileting:   Independent  Functional Mobility:   Modified Independent     ASSESSMENT:  Ms. Mary Castellanos is here for CVA/TIA workup initially with L sided weakness/slurred speech which has essentially resolved. She was educated on signs/symptoms of CVA. B UE equal in strength (5/5) and sensation to light touch. She is currently independent/modified independent with ADL/instrumetnal ADL. No further need for acute OT services. SUBJECTIVE:   Ms. Mary Castellanos states, \"We were eating. \"    SOCIAL HISTORY/LIVING ENVIRONMENT:   Home Environment: Private residence  # Steps to Enter: 0  One/Two Story Residence: One story  Living Alone: No  Support Systems: Spouse/Significant Other/Partner    OBJECTIVE:     PAIN: VITAL SIGNS: LINES/DRAINS:   Pre Treatment: Pain Screen  Pain Scale 1: Numeric (0 - 10)  Pain Intensity 1: 0  Post Treatment:      O2 Device: Room air     GROSS EVALUATION:  B UE Within Functional Limits Abnormal/ Functional Abnormal/ Non-Functional (see comments) Not Tested Comments:   AROM [x] [] [] []    PROM [] [] [] []    Strength [x] [] [] []    Balance [x] [] [] []    Posture [x] [] [] []    Sensation [x] [] [] []    Coordination [x] [] [] []    Tone [x] [] [] []    Edema [] [] [] []    Activity Tolerance [] [] [] []     [] [] [] []      COGNITION/  PERCEPTION: Intact Impaired   (see comments) Comments:   Orientation [x] []    Vision [x] []    Hearing [] []    Judgment/ Insight [x] []    Attention [] []    Memory [] []    Command Following [x] []    Emotional Regulation [] []     [] []      ACTIVITIES OF DAILY LIVING: I Mod I S SBA CGA Min Mod Max Total NT Comments   BASIC ADLs:              Bathing/ Showering [x] [] [] [] [] [] [] [] [] []    Toileting [x] [] [] [] [] [] [] [] [] []    Dressing [] [x] [] [] [] [] [] [] [] []    Feeding [x] [] [] [] [] [] [] [] [] []    Grooming [x] [] [] [] [] [] [] [] [] []    Personal Device Care [] [] [] [] [] [] [] [] [] [x]    Functional Mobility [] [x] [] [] [] [] [] [] [] []    I=Independent, Mod I=Modified Independent, S=Supervision, SBA=Standby Assistance, CGA=Contact Guard Assistance,   Min=Minimal Assistance, Mod=Moderate Assistance, Max=Maximal Assistance, Total=Total Assistance, NT=Not Tested    MOBILITY: I Mod I S SBA CGA Min Mod Max Total  NT x2 Comments:   Supine to sit [x] [] [] [] [] [] [] [] [] [] []    Sit to supine [x] [] [] [] [] [] [] [] [] [] []    Sit to stand [x] [] [] [] [] [] [] [] [] [] []    Bed to chair [] [] [] [] [] [] [] [] [] [x] []    I=Independent, Mod I=Modified Independent, S=Supervision, SBA=Standby Assistance, CGA=Contact Guard Assistance,   Min=Minimal Assistance, Mod=Moderate Assistance, Max=Maximal Assistance, Total=Total Assistance, NT=Not Tested    [address] [facility] [address] [address] AM-PAC 6 Clicks   Daily Activity Inpatient Short Form        How much help from another person does the patient currently need. .. Total A Lot A Little None   1.   Putting on and taking off regular lower body clothing? [] 1   [] 2   [] 3   [x] 4   2. Bathing (including washing, rinsing, drying)? [] 1   [] 2   [] 3   [x] 4   3. Toileting, which includes using toilet, bedpan or urinal?   [] 1   [] 2   [] 3   [x] 4   4. Putting on and taking off regular upper body clothing? [] 1   [] 2   [] 3   [x] 4   5. Taking care of personal grooming such as brushing teeth? [] 1   [] 2   [] 3   [x] 4   6. Eating meals? [] 1   [] 2   [] 3   [x] 4   © 2007, Trustees of [employer] [employer], under license to [employer]. All rights reserved     Score:  Initial: 24 Most Recent: X (Date: -- )   Interpretation of Tool:  Represents activities that are increasingly more difficult (i.e. Bed mobility, Transfers, Gait). PLAN:   FREQUENCY/DURATION:   for duration of hospital stay or until stated goals are met, whichever comes first.    TREATMENT:     EVALUATION: Low Complexity : (Untimed Charge)    TREATMENT:   (     )  · No treatment rendered. Assessment only.       AFTER TREATMENT POSITION/PRECAUTIONS:  Bed and Needs within reach    INTERDISCIPLINARY COLLABORATION:  OT/RENTERIA    TOTAL TREATMENT DURATION:  OT Patient Time In/Time Out  Time In: 1407  Time Out: [address] MAURY Chacon, OTR/L

## 2021-03-14 NOTE — PROGRESS NOTES
03/13/21 1900   Dual Skin Pressure Injury Assessment   Dual Skin Pressure Injury Assessment WDL   Second Care Provider (Based on [address] [place] [address]) Vielka CLARKE   Skin Integumentary   Skin Integumentary (WDL) WDL    Pressure  Injury Documentation No Pressure Injury Noted-Pressure Ulcer Prevention Initiated   Skin Color Appropriate for ethnicity   Skin Condition/Temp Warm   Skin Integrity Intact   Turgor Epidermis thin w/ loss of subcut tissue   Hair Growth Present   Wound Prevention and Protection Methods   Orientation of Wound Prevention Posterior   Location of Wound Prevention Sacrum/Coccyx   Dressing Present  No   Wound Offloading (Prevention Methods) Bed, pressure reduction mattress   Dual with Xavi Vinecnt

## 2021-03-14 NOTE — CONSULTS
Jesse Beavers MD  Medical Director  [address] [place] [address], [address] [facility]  Tel: [phone]       Physical Medicine & Rehabilitation Consult    Subjective:     Date of Consultation:  March 14, 2021  Referring Physician: Dr Anne Henry is a 77 y.o. female who is being evaluated at the request of the Hospitalist service for consideration for inpatient rehabilitation following presentation under the stroke protocol. HPI: The pt is a previously functionally independent , right hand dominant 76 yo. WF with a PMH of anxiety, HLD, spinal stenosis s/p 2 prior surgeries , OA bilateral knees who presented to the ED on 3/13 with an acute onset of numbness of the left face and subsequently the LUE. [place] They did improve a bit after few minutes.  said he noticed slurred speech and left facial droop. Pt in ER was c/o headache for which she got Toradol. She denied any gait difficulties. She reports long standing numbness of the left leg due to spinal stenosis with chronic pain. Head CT wnl. CTA with mild L ICA origin stenosis and no cerebral LVO. MRI without any infarcts. 2D ECHO pending. pts triglycerides 500 with chol of 275, LDL unable to be calculated due to elevated triglycerides. . She is on hi intensity statin, asa. Pt reports a lot of anxiety due to husbands recent diagnosis of fibrosarcoma.      Principal Problem:    Left upper extremity numbness (3/13/2021)    Active Problems:    Slurred speech (3/13/2021)      Anxiety (3/13/2021)      Tremor (3/13/2021)      Hyperlipidemia (3/13/2021)      Left leg numbness (3/13/2021)      Overview: Chronic from back surgery      HTN (hypertension) (3/13/2021)      Past Medical History:   Diagnosis Date    Anxiety     Depression     GERD (gastroesophageal reflux disease)     HTN (hypertension)     Insomnia     Panic attack     Tremors of nervous system       Past Surgical History: Procedure Laterality Date    HX APPENDECTOMY      HX CARPAL TUNNEL RELEASE      bilateral    HX LITHOTRIPSY      HX ORTHOPAEDIC      lower back    HX OTHER SURGICAL      partial bowel removal as child    HX TUBAL LIGATION        No family history on file. Social History     Tobacco Use    Smoking status: Former Smoker     Quit date: 3/29/2013     Years since quittin.9    Smokeless tobacco: Never Used   Substance Use Topics    Alcohol use: Yes     Comment: rare     Prior to Admission medications    Medication Sig Start Date End Date Taking? Authorizing Provider   lidocaine (LIDODERM) 5 % Apply patch to the affected area for 12 hours a day and remove for 12 hours a day. 19   Haris Monteiro MD   omeprazole (PRILOSEC) 40 mg capsule Take 40 mg by mouth. Izabel Montoya MD   propranolol (INDERAL) 20 mg tablet Take 20 mg by mouth. Izabel Montoya MD   ezetimibe (ZETIA) 10 mg tablet Take 10 mg by mouth daily. Izabel Montoya MD   ramipril (ALTACE) 10 mg capsule Take 10 mg by mouth daily. Izabel Montoya MD   FLUoxetine (PROZAC) 20 mg capsule Take 20 mg by mouth daily. zIabel Montoya MD   atorvastatin (LIPITOR) 80 mg tablet Take 80 mg by mouth daily. Izabel Montoya MD     Allergies   Allergen Reactions    Ambien [Zolpidem] Other (comments)     hallucinations        Review of Systems:  A comprehensive review of systems was negative except for that written in the HPI. Facial numbness and headache resolved. + anxiety, + chronic numbness and paresthesias LLE, bilateral OA of the knees, lower lumbar fx below fusion; has f/u with surgeon at [facility] next mos  Objective:     Vitals:  Blood pressure (!) 129/55, pulse 62, temperature 97.5 °F (36.4 °C), resp. rate 18, height 5' 5\" (1.651 m), weight 172 lb (78 kg), SpO2 96 %. Temp (24hrs), Av.9 °F (36.6 °C), Min:97.5 °F (36.4 °C), Max:98.5 °F (36.9 °C)      Intake and Output:  No intake/output data recorded.     Physical Exam:  General:  Alert, oriented and mood affect appropriate   Lungs:   Clear to auscultation bilaterally. Heart:  Regular rate and rhythm, S1, S2 stable, no murmur, click, rub or gallop. Abdomen:   Soft, non-tender. Bowel sounds present. No masses,  No organomegaly. obese   Genitourinary: defer   Neuro Muscular: Mild prox LUE weakness 4+ , bilateral hip flexor weakness 4/5 otherwise non focal exam.   Numbness L4/5 distrib LLE    Skin:  No rashes, lesions, or signs/symptoms or infection. Labs/Studies:  Recent Results (from the past 72 hour(s))   GLUCOSE, POC    Collection Time: 03/13/21  2:50 PM   Result Value Ref Range    Glucose (POC) 87 65 - 100 mg/dL   CBC WITH AUTOMATED DIFF    Collection Time: 03/13/21  2:51 PM   Result Value Ref Range    WBC 9.2 4.3 - 11.1 K/uL    RBC 3.92 (L) 4.05 - 5.2 M/uL    HGB 12.4 11.7 - 15.4 g/dL    HCT 37.0 35.8 - 46.3 %    MCV 94.4 79.6 - 97.8 FL    MCH 31.6 26.1 - 32.9 PG    MCHC 33.5 31.4 - 35.0 g/dL    RDW 12.7 11.9 - 14.6 %    PLATELET 350 468 - 397 K/uL    MPV 10.5 9.4 - 12.3 FL    ABSOLUTE NRBC 0.00 0.0 - 0.2 K/uL    NEUTROPHILS 41 (L) 47 - 75 %    LYMPHOCYTES 52 (H) 16 - 44 %    MONOCYTES 4 3 - 9 %    EOSINOPHILS 2 1 - 8 %    BASOPHILS 1 0 - 2 %    ABS. NEUTROPHILS 3.8 1.7 - 8.2 K/UL    ABS. LYMPHOCYTES 4.7 (H) 0.5 - 4.6 K/UL    ABS. MONOCYTES 0.4 0.1 - 1.3 K/UL    ABS. EOSINOPHILS 0.2 0.0 - 0.8 K/UL    ABS.  BASOPHILS 0.1 0.0 - 0.2 K/UL    RBC COMMENTS NORMOCYTIC/NORMOCHROMIC      PLATELET COMMENTS ADEQUATE      DF MANUAL     METABOLIC PANEL, COMPREHENSIVE    Collection Time: 03/13/21  2:51 PM   Result Value Ref Range    Sodium 142 136 - 145 mmol/L    Potassium 4.1 3.5 - 5.1 mmol/L    Chloride 111 (H) 98 - 107 mmol/L    CO2 30 21 - 32 mmol/L    Anion gap 1 (L) 7 - 16 mmol/L    Glucose 91 65 - 100 mg/dL    BUN 18 8 - 23 MG/DL    Creatinine 0.96 0.6 - 1.0 MG/DL    GFR est AA >60 >60 ml/min/1.73m2    GFR est non-AA >60 >60 ml/min/1.73m2    Calcium 9.1 8.3 - 10.4 MG/DL    Bilirubin, total 0.3 0.2 - 1.1 MG/DL    ALT (SGPT) 28 12 - 65 U/L    AST (SGOT) 34 15 - 37 U/L    Alk. phosphatase 95 50 - 136 U/L    Protein, total 6.6 6.3 - 8.2 g/dL    Albumin 3.4 3.2 - 4.6 g/dL    Globulin 3.2 2.3 - 3.5 g/dL    A-G Ratio 1.1 (L) 1.2 - 3.5     PROTHROMBIN TIME + INR    Collection Time: 03/13/21  2:51 PM   Result Value Ref Range    Prothrombin time 12.9 12.5 - 14.7 sec    INR 0.9     MAGNESIUM    Collection Time: 03/13/21  2:51 PM   Result Value Ref Range    Magnesium 2.1 1.8 - 2.4 mg/dL   EKG, 12 LEAD, INITIAL    Collection Time: 03/13/21  3:11 PM   Result Value Ref Range    Ventricular Rate 58 BPM    Atrial Rate 187 BPM    QRS Duration 88 ms    Q-T Interval 454 ms    QTC Calculation (Bezet) 445 ms    Calculated P Axis 60 degrees    Calculated R Axis -25 degrees    Calculated T Axis 53 degrees    Diagnosis       !! AGE AND GENDER SPECIFIC ECG ANALYSIS !!   Sinus rhythm  Low voltage QRS  Cannot rule out Anterior infarct (cited on or before 29-MAR-2015)  Abnormal ECG  When compared with ECG of 29-MAR-2015 18:35,    Confirmed by Kimberly Salvador MD (), RAUDEL LOCK ([id number]) on 3/13/2021 6:13:50 PM     URINALYSIS W/ RFLX MICROSCOPIC    Collection Time: 03/13/21  4:19 PM   Result Value Ref Range    Color YELLOW      Appearance CLEAR      Specific gravity 1.030 (H) 1.001 - 1.023      pH (UA) 5.5 5.0 - 9.0      Protein Negative NEG mg/dL    Glucose Negative mg/dL    Ketone Negative NEG mg/dL    Bilirubin Negative NEG      Blood Negative NEG      Urobilinogen 0.2 0.2 - 1.0 EU/dL    Nitrites Negative NEG      Leukocyte Esterase Negative NEG     LIPID PANEL    Collection Time: 03/14/21  4:05 AM   Result Value Ref Range    LIPID PROFILE          Cholesterol, total 275 (H) <200 MG/DL    Triglyceride 500 (H) 35 - 150 MG/DL    HDL Cholesterol 35 (L) 40 - 60 MG/DL    LDL, calculated Not calculated due to elevated triglyceride level <100 MG/DL    VLDL, calculated 100 (H) 6.0 - 23.0 MG/DL    CHOL/HDL Ratio 7.9     HEMOGLOBIN A1C WITH EAG    Collection Time: 03/14/21  4:05 AM   Result Value Ref Range    Hemoglobin A1c 5.2 4.8 - 6.0 %    Est. average glucose 103 mg/dL   LDL, DIRECT    Collection Time: 03/14/21  4:05 AM   Result Value Ref Range    LDL,Direct 141 (H) <100 mg/dl           Speech Assessment:  Dysphagia Screening  Vocal Quality/Secretions: Normal  History of Dysphagia: No  O2 Saturation: Normal  Alertness: Normal  Pre-Swallow Assessment Score: 0  Purees: No difficulty noted  Water by Cup: No difficulty noted  Water by Straw: No difficulty noted                Ambulation:  Activity and Safety  Activity Level: Up with Assistance  Activity: Family/Visitors present, In chair  Activity Assistance: Partial (one person)  Weight Bearing Status: WBAT (Weight Bearing as Tolerated)  Patient Turned: Turns self  Head of Bed Elevated: HOB 30  Activity Response: Fairly tolerated  Assistive Device: Fall prevention device  Safety Measures: Bed/Chair alarm on, Bed/Chair-Wheels locked, Bed in low position, Call light within reach, Fall prevention (comment)     Impression / Assessment:     Principal Problem:    Left upper extremity numbness (3/13/2021)    Active Problems:    Slurred speech (3/13/2021)      Anxiety (3/13/2021)      Tremor (3/13/2021)      Hyperlipidemia (3/13/2021)      Left leg numbness (3/13/2021)      Overview: Chronic from back surgery      HTN (hypertension) (3/13/2021)      TIA    Plan / Recommendations / Medical Decision Making:     Recommendations:   Continue Acute Rehab Program  Coordination of rehab / medical care  Counseling of PM&R care issues management  Monitoring and management of medical conditions per plan of care / orders   -symptoms have resolved , pt at baseline. MRI neg for stroke. -PT/OT pending  -chronic mobility issues due to severe spinal stenosis and bilateral knee arthritis. Uses an AD at times. -pt is under a lot of stress right now with her own health issues and husbands recent diagnosis of fibrosarcoma with pending PET scan.  May be contributing factor   -elevated triglycerides and chol. ; recommend [place] 3s (Epanova, Lovaza , Vascepa), fibrates (Lopid, Tricor)  Or Niaspan   -will not require inpt rehab. Likely at baseline. Discussion with pt / Staff  Reviewed Jin Morales / Rachel Getting / Records    Thank you very much for this referral. We appreciate the opportunity to participate in this patient's care. We will continue to follow. Marsha Bonilla MD, Medical Director  [facility]

## 2021-03-14 NOTE — PROGRESS NOTES
CM screen patient medical chart. Patient was seen by PT/OT and recommendation no further skill therapy. Patient lives in a one level home with spouse with no steps to enter into the home. Patient independent with ADL's and no DME's in the home. CM will follow up with patient to finalize discharge plan. Care Management Interventions  PCP Verified by CM: Yes  Mode of Transport at Discharge:  Other (see comment)  Transition of Care Consult (CM Consult): Discharge Planning  Discharge Durable Medical Equipment: No  Occupational Therapy Consult: Yes  Speech Therapy Consult: Yes  Current Support Network: Own Home, Lives with Spouse  Confirm Follow Up Transport: Family  The Patient and/or Patient Representative was Provided with a Choice of Provider and Agrees with the Discharge Plan?: No  Freedom of Choice List was Provided with Basic Dialogue that Supports the Patient's Individualized Plan of Care/Goals, Treatment Preferences and Shares the Quality Data Associated with the Providers?: No  [place] Resource Information Provided?: No  Discharge Location  Discharge Placement: Home

## 2021-03-14 NOTE — PROGRESS NOTES
03/13/21 1921   NIH Stroke Scale   Interval Other (comment)  (dual with bianca)   LOC 0   LOC Questions 0   LOC Commands 0   Best Gaze 0   Visual 0   Facial Palsy 0   Motor Right Arm 0   Motor Left Arm 0   Motor Right Leg 0   Motor Left Leg 0   Limb Ataxia 0   Sensory 1   Best Language 0   Dysarthria 0   Extinction and Inattention 0   Total 1   dual with Margarette Multani

## 2021-03-14 NOTE — ROUTINE PROCESS
Verbal bedside report received from [place], [address] [facility]. Assumed care of patient. [facility] done @ bedside.

## 2021-03-14 NOTE — PROGRESS NOTES
Progress Note    Patient: Ricci Dunbar MRN: [id number]  SSN: [id number]    YOB: 1955  Age: 77 y.o. Sex: female      Admit Date: 3/13/2021    LOS: 1 day     Subjective:     Patient with past medical history of    Anxiety   Hyperlipidemia   S/P back surgery with chronic left leg numbness   Hypertension     Patient came in to hospital after  noticed her having slurred speech with numbness of left facial area and left arm, with left facial droop. 3/14/21 This morning, patient reports resolution of slurred speech and improvement of left facial and left arm numbness. Objective:     Vitals:    03/13/21 2000 03/14/21 0000 03/14/21 0400 03/14/21 0800   BP: (!) 150/67 132/65 131/68 (!) 129/55   Pulse: (!) 54 60 (!) 53 62   Resp: 18 18 18 18   Temp: 97.9 °F (36.6 °C) 98.1 °F (36.7 °C) 97.8 °F (36.6 °C) 97.5 °F (36.4 °C)   SpO2: 97% 97% 97% 96%   Weight:       Height:            Intake and Output:  Current Shift: 03/14 0701 - 03/14 1900  In: 120 [P.O.:120]  Out: -   Last three shifts: No intake/output data recorded. Physical Exam:     General:                    The patient is a pleasant female in no acute distress. alert and oriented to place, time and person. Talking and answering questions well. Head:                                   Normocephalic/atraumatic. Eyes:                                   No palpebral pallor or scleral icterus. ENT:                                    External auricular and nasal exam within normal limits. Mucous membranes are moist.  Neck:                                   Supple, non-tender, no JVD. Lungs:                       Clear to auscultation bilaterally without wheezes or crackles. No respiratory distress or accessory muscle use. Heart:                                  Regular rate and rhythm, without murmurs, rubs, or gallops.   Abdomen: Soft, non-tender, non-distended with normoactive bowel sounds. Genitourinary:           No tenderness over the bladder or bilateral CVAs. Extremities:               Without clubbing, cyanosis, or edema. Skin:                                    Normal color, texture, and turgor. No rashes, lesions, or jaundice. Pulses:                      Radial and dorsalis pedis pulses present 2+ bilaterally. Capillary refill <2s. Neurologic:                CN II-XII grossly intact and symmetrical.                                               Moving all four extremities well with no focal deficits. No slurred speech. No facial droop. Psychiatric:                Pleasant demeanor, appropriate affect. Alert and oriented x 3      Lab/Data Review:    Recent Results (from the past 24 hour(s))   GLUCOSE, POC    Collection Time: 03/13/21  2:50 PM   Result Value Ref Range    Glucose (POC) 87 65 - 100 mg/dL   CBC WITH AUTOMATED DIFF    Collection Time: 03/13/21  2:51 PM   Result Value Ref Range    WBC 9.2 4.3 - 11.1 K/uL    RBC 3.92 (L) 4.05 - 5.2 M/uL    HGB 12.4 11.7 - 15.4 g/dL    HCT 37.0 35.8 - 46.3 %    MCV 94.4 79.6 - 97.8 FL    MCH 31.6 26.1 - 32.9 PG    MCHC 33.5 31.4 - 35.0 g/dL    RDW 12.7 11.9 - 14.6 %    PLATELET 202 850 - 236 K/uL    MPV 10.5 9.4 - 12.3 FL    ABSOLUTE NRBC 0.00 0.0 - 0.2 K/uL    NEUTROPHILS 41 (L) 47 - 75 %    LYMPHOCYTES 52 (H) 16 - 44 %    MONOCYTES 4 3 - 9 %    EOSINOPHILS 2 1 - 8 %    BASOPHILS 1 0 - 2 %    ABS. NEUTROPHILS 3.8 1.7 - 8.2 K/UL    ABS. LYMPHOCYTES 4.7 (H) 0.5 - 4.6 K/UL    ABS. MONOCYTES 0.4 0.1 - 1.3 K/UL    ABS. EOSINOPHILS 0.2 0.0 - 0.8 K/UL    ABS.  BASOPHILS 0.1 0.0 - 0.2 K/UL    RBC COMMENTS NORMOCYTIC/NORMOCHROMIC      PLATELET COMMENTS ADEQUATE      DF MANUAL     METABOLIC PANEL, COMPREHENSIVE    Collection Time: 03/13/21  2:51 PM   Result Value Ref Range    Sodium 142 136 - 145 mmol/L    Potassium 4.1 3.5 - 5.1 mmol/L    Chloride 111 (H) 98 - 107 mmol/L    CO2 30 21 - 32 mmol/L    Anion gap 1 (L) 7 - 16 mmol/L    Glucose 91 65 - 100 mg/dL    BUN 18 8 - 23 MG/DL    Creatinine 0.96 0.6 - 1.0 MG/DL    GFR est AA >60 >60 ml/min/1.73m2    GFR est non-AA >60 >60 ml/min/1.73m2    Calcium 9.1 8.3 - 10.4 MG/DL    Bilirubin, total 0.3 0.2 - 1.1 MG/DL    ALT (SGPT) 28 12 - 65 U/L    AST (SGOT) 34 15 - 37 U/L    Alk. phosphatase 95 50 - 136 U/L    Protein, total 6.6 6.3 - 8.2 g/dL    Albumin 3.4 3.2 - 4.6 g/dL    Globulin 3.2 2.3 - 3.5 g/dL    A-G Ratio 1.1 (L) 1.2 - 3.5     PROTHROMBIN TIME + INR    Collection Time: 03/13/21  2:51 PM   Result Value Ref Range    Prothrombin time 12.9 12.5 - 14.7 sec    INR 0.9     MAGNESIUM    Collection Time: 03/13/21  2:51 PM   Result Value Ref Range    Magnesium 2.1 1.8 - 2.4 mg/dL   EKG, 12 LEAD, INITIAL    Collection Time: 03/13/21  3:11 PM   Result Value Ref Range    Ventricular Rate 58 BPM    Atrial Rate 187 BPM    QRS Duration 88 ms    Q-T Interval 454 ms    QTC Calculation (Bezet) 445 ms    Calculated P Axis 60 degrees    Calculated R Axis -25 degrees    Calculated T Axis 53 degrees    Diagnosis       !! AGE AND GENDER SPECIFIC ECG ANALYSIS !!   Sinus rhythm  Low voltage QRS  Cannot rule out Anterior infarct (cited on or before 29-MAR-2015)  Abnormal ECG  When compared with ECG of 29-MAR-2015 18:35,    Confirmed by Moi Casper MD (), RAUDEL LOCK ([id number]) on 3/13/2021 6:13:50 PM     URINALYSIS W/ RFLX MICROSCOPIC    Collection Time: 03/13/21  4:19 PM   Result Value Ref Range    Color YELLOW      Appearance CLEAR      Specific gravity 1.030 (H) 1.001 - 1.023      pH (UA) 5.5 5.0 - 9.0      Protein Negative NEG mg/dL    Glucose Negative mg/dL    Ketone Negative NEG mg/dL    Bilirubin Negative NEG      Blood Negative NEG      Urobilinogen 0.2 0.2 - 1.0 EU/dL    Nitrites Negative NEG      Leukocyte Esterase Negative NEG     LIPID PANEL    Collection Time: 03/14/21  4:05 AM   Result Value Ref Range    LIPID PROFILE          Cholesterol, total 275 (H) <200 MG/DL    Triglyceride 500 (H) 35 - 150 MG/DL    HDL Cholesterol 35 (L) 40 - 60 MG/DL    LDL, calculated Not calculated due to elevated triglyceride level <100 MG/DL    VLDL, calculated 100 (H) 6.0 - 23.0 MG/DL    CHOL/HDL Ratio 7.9     HEMOGLOBIN A1C WITH EAG    Collection Time: 03/14/21  4:05 AM   Result Value Ref Range    Hemoglobin A1c 5.2 4.8 - 6.0 %    Est. average glucose 103 mg/dL   LDL, DIRECT    Collection Time: 03/14/21  4:05 AM   Result Value Ref Range    LDL,Direct 141 (H) <100 mg/dl     CT code S   3-  IMPRESSION  1. No evidence of acute intracranial abnormality. CT perfusion   3-     IMPRESSION     No core infarct. CTA code S   3-  IMPRESSION  No intracranial arterial occlusion.     XR chest   3-  IMPRESSION  1. No acute cardiopulmonary abnormality. MRI brain   3-  IMPRESSION     1. No acute infarction.     2. Nonspecific white matter findings compatible with mild chronic small vessel  ischemic disease.     3. Partially empty sella, with distended optic nerve sheath. This pattern may be  present with idiopathic intracranial hypertension.       Current Facility-Administered Medications:     atorvastatin (LIPITOR) tablet 80 mg, 80 mg, Oral, DAILY, Bassam Bey MD, 80 mg at 03/14/21 0835    FLUoxetine (PROzac) capsule 20 mg, 20 mg, Oral, DAILY, Bassam Bey MD, 20 mg at 03/14/21 0834    propranoloL (INDERAL) tablet 20 mg, 20 mg, Oral, DAILY, Bassam Bey MD, 20 mg at 03/14/21 0834    sodium chloride (NS) flush 5-40 mL, 5-40 mL, IntraVENous, Q8H, Bassam Bey MD, 10 mL at 03/14/21 0540    sodium chloride (NS) flush 5-40 mL, 5-40 mL, IntraVENous, PRN, Chris Slaughter MD    aspirin chewable tablet 81 mg, 81 mg, Oral, DAILY, Bassam Bey MD, 81 mg at 03/14/21 0834    acetaminophen (TYLENOL) tablet 650 mg, 650 mg, Oral, Q4H PRN, Chris Slaughter MD    labetaloL (NORMODYNE;TRANDATE) injection 5 mg, 5 mg, IntraVENous, Q10MIN PRN, Fiordaliza Orellana MD    heparin (porcine) injection 5,000 Units, 5,000 Units, SubCUTAneous, Q8H, Bassam Bey MD, 5,000 Units at 03/14/21 0835    0.9% sodium chloride infusion, 75 mL/hr, IntraVENous, CONTINUOUS, Bassam Bey MD, Last Rate: 75 mL/hr at 03/13/21 1839, 75 mL/hr at 03/13/21 1839        Assessment:     Principal Problem:    Left upper extremity numbness (3/13/2021)    Active Problems:    Slurred speech (3/13/2021)      Anxiety (3/13/2021)      Tremor (3/13/2021)      Hyperlipidemia (3/13/2021)      Left leg numbness (3/13/2021)      Overview: Chronic from back surgery      HTN (hypertension) (3/13/2021)        Plan:     Symptoms consistent with TIA   On Aspirin and Atorvastatin. Monitor   Physical therapy. Hypertension  Monitor blood pressure and manage accordingly. Continue home medications. BP is controlled     History of tremor   Continue Propranolol     I have discussed the plan of care with patient.      DVT prophylaxis : heparin SC         Signed By: Reynaldo Bliss MD     March 14, 2021

## 2021-03-14 NOTE — PROGRESS NOTES
03/14/21 0710   NIH Stroke Scale   Interval Other (comment)   LOC 0   LOC Questions 0   LOC Commands 0   Best Gaze 0   Visual 0   Facial Palsy 0   Motor Right Arm 0   Motor Left Arm 0   Motor Right Leg 0   Motor Left Leg 0   Limb Ataxia 0   Sensory 1   Best Language 0   Dysarthria 0   Extinction and Inattention 0   Total 1   Dual NIH w/ Emma

## 2021-03-15 VITALS
RESPIRATION RATE: 18 BRPM | HEART RATE: 59 BPM | SYSTOLIC BLOOD PRESSURE: 148 MMHG | HEIGHT: 65 IN | BODY MASS INDEX: 28.66 KG/M2 | TEMPERATURE: 98.5 F | OXYGEN SATURATION: 99 % | WEIGHT: 172 LBS | DIASTOLIC BLOOD PRESSURE: 70 MMHG

## 2021-03-15 PROCEDURE — 74011250637 HC RX REV CODE- 250/637: Performed by: FAMILY MEDICINE

## 2021-03-15 PROCEDURE — 74011250636 HC RX REV CODE- 250/636: Performed by: FAMILY MEDICINE

## 2021-03-15 RX ORDER — GUAIFENESIN 100 MG/5ML
81 LIQUID (ML) ORAL DAILY
Qty: 30 TAB | Refills: 0 | Status: SHIPPED | OUTPATIENT
Start: 2021-03-15 | End: 2021-04-14

## 2021-03-15 RX ADMIN — ACETAMINOPHEN 650 MG: 325 TABLET ORAL at 07:34

## 2021-03-15 RX ADMIN — ASPIRIN 81 MG: 81 TABLET, CHEWABLE ORAL at 07:35

## 2021-03-15 RX ADMIN — ATORVASTATIN CALCIUM 80 MG: 80 TABLET, FILM COATED ORAL at 07:35

## 2021-03-15 RX ADMIN — FLUOXETINE 20 MG: 10 CAPSULE ORAL at 07:35

## 2021-03-15 RX ADMIN — HEPARIN SODIUM 5000 UNITS: 5000 INJECTION INTRAVENOUS; SUBCUTANEOUS at 01:17

## 2021-03-15 RX ADMIN — Medication 10 ML: at 05:13

## 2021-03-15 RX ADMIN — PROPRANOLOL HYDROCHLORIDE 20 MG: 10 TABLET ORAL at 07:35

## 2021-03-15 RX ADMIN — HEPARIN SODIUM 5000 UNITS: 5000 INJECTION INTRAVENOUS; SUBCUTANEOUS at 08:58

## 2021-03-15 NOTE — PROGRESS NOTES
Pt is medically cleared for dc to home today. No dc needs or concerns identified or reported. SW remains available to assist as needed. Care Management Interventions  PCP Verified by CM: Yes  Mode of Transport at Discharge:  Other (see comment)  Transition of Care Consult (CM Consult): Discharge Planning  Discharge Durable Medical Equipment: No  Occupational Therapy Consult: Yes  Speech Therapy Consult: Yes  Current Support Network: Own Home, Lives with Spouse  Confirm Follow Up Transport: Family  The Patient and/or Patient Representative was Provided with a Choice of Provider and Agrees with the Discharge Plan?: No  Freedom of Choice List was Provided with Basic Dialogue that Supports the Patient's Individualized Plan of Care/Goals, Treatment Preferences and Shares the Quality Data Associated with the Providers?: No   Resource Information Provided?: No  Discharge Location  Discharge Placement: Home

## 2021-03-15 NOTE — PROGRESS NOTES
Discharge instructions and prescriptions provided and explained to the pt. Removed peripheral IV. Opportunity for questions provided. Instructed to call once ready to leave.

## 2021-03-15 NOTE — DISCHARGE SUMMARY
Discharge Summary     Patient: Kristy Clements MRN: [id number]  SSN: [id number]    YOB: 1955  Age: 77 y.o. Sex: female       Admit Date: 3/13/2021    Discharge Date: 3/15/2021      Admission Diagnoses: Left sided numbness [R20.0]  Slurred speech [R47.81]    Discharge Diagnoses:   Problem List as of 3/15/2021 Never Reviewed          Codes Class Noted - Resolved    Slurred speech ICD-10-CM: R47.81  ICD-9-CM: 784.59  3/13/2021 - Present        Anxiety ICD-10-CM: F41.9  ICD-9-CM: 300.00  3/13/2021 - Present        Tremor ICD-10-CM: R25.1  ICD-9-CM: 781.0  3/13/2021 - Present        Hyperlipidemia ICD-10-CM: E78.5  ICD-9-CM: 272.4  3/13/2021 - Present        Left leg numbness ICD-10-CM: R20.0  ICD-9-CM: 782.0  3/13/2021 - Present    Overview Signed 3/13/2021  5:18 PM by Enma Leiva MD     Chronic from back surgery             * (Principal) Left upper extremity numbness ICD-10-CM: R20.0  ICD-9-CM: 782.0  3/13/2021 - Present        HTN (hypertension) ICD-10-CM: I10  ICD-9-CM: 401.9  3/13/2021 - Present        Spondylolisthesis at L4-L5 level ICD-10-CM: M43.16  ICD-9-CM: 756.12  1/8/2019 - Present        Lumbar stenosis with neurogenic claudication ICD-10-CM: M48.062  ICD-9-CM: 724.03  1/8/2019 - Present        HNP (herniated nucleus pulposus), lumbar ICD-10-CM: M51.26  ICD-9-CM: 722.10  1/8/2019 - Present               Discharge Condition: Stable    Hospital Course:     Patient with past medical history of    Anxiety   Hyperlipidemia   S/P back surgery with chronic left leg numbness   Hypertension      Patient came in to hospital after  noticed her having slurred speech with numbness of left facial area and left arm, with left facial droop.      The following day, patient reports resolution of slurred speech and improvement of left facial and left arm numbness. 3/15/21 Patient would like to be discharged. She was seen by OT and PT and no need for rehabilitation.      Physical Exam:    General:                    The patient is a pleasant female in no acute distress. alert and oriented to place, time and person. Talking and answering questions well. LTRJ:                                   [id number]/[id number]. Eyes:                                   No palpebral pallor or scleral icterus. ENT:                                    External auricular and nasal exam within normal limits.                                             ICSRVQ membranes are moist.  Neck:                                   Supple, non-tender, no JVD. Lungs:                       Clear to auscultation bilaterally without wheezes or crackles.                                             No respiratory distress or accessory muscle use. Heart:                                  Regular rate and rhythm, without murmurs, rubs, or gallops. Abdomen:                  Soft, non-tender, non-distended with normoactive bowel sounds. Genitourinary:           No tenderness over the bladder or bilateral CVAs. Extremities:               Without clubbing, cyanosis, or edema. Skin:                                    Normal color, texture, and turgor. No rashes, lesions, or jaundice. Pulses:                      Radial and dorsalis pedis pulses present 2+ bilaterally.                                               Capillary refill <2s. Neurologic:                CN II-XII grossly intact and symmetrical.                                               Moving all four extremities well with no focal deficits. No slurred speech. No facial droop. Psychiatric:                Pleasant demeanor, appropriate affect. Alert and oriented x 3    Consults: Physiatry , tele neurology     Significant Diagnostic Studies:     CT code S   3-  IMPRESSION  1.  No evidence of acute intracranial abnormality.     CT perfusion   3-     IMPRESSION     No core infarct.     CTA code S 3-  IMPRESSION  No intracranial arterial occlusion.     XR chest   3-  IMPRESSION  1. No acute cardiopulmonary abnormality.     MRI brain   3-  IMPRESSION     1. No acute infarction.     2. Nonspecific white matter findings compatible with mild chronic small vessel  ischemic disease.     3. Partially empty sella, with distended optic nerve sheath. This pattern may be  present with idiopathic intracranial hypertension. Recent Results (from the past 24 hour(s))   GLUCOSE, POC     Collection Time: 03/13/21  2:50 PM   Result Value Ref Range     Glucose (POC) 87 65 - 100 mg/dL   CBC WITH AUTOMATED DIFF     Collection Time: 03/13/21  2:51 PM   Result Value Ref Range     WBC 9.2 4.3 - 11.1 K/uL     RBC 3.92 (L) 4.05 - 5.2 M/uL     HGB 12.4 11.7 - 15.4 g/dL     HCT 37.0 35.8 - 46.3 %     MCV 94.4 79.6 - 97.8 FL     MCH 31.6 26.1 - 32.9 PG     MCHC 33.5 31.4 - 35.0 g/dL     RDW 12.7 11.9 - 14.6 %     PLATELET 253 465 - 127 K/uL     MPV 10.5 9.4 - 12.3 FL     ABSOLUTE NRBC 0.00 0.0 - 0.2 K/uL     NEUTROPHILS 41 (L) 47 - 75 %     LYMPHOCYTES 52 (H) 16 - 44 %     MONOCYTES 4 3 - 9 %     EOSINOPHILS 2 1 - 8 %     BASOPHILS 1 0 - 2 %     ABS. NEUTROPHILS 3.8 1.7 - 8.2 K/UL     ABS. LYMPHOCYTES 4.7 (H) 0.5 - 4.6 K/UL     ABS. MONOCYTES 0.4 0.1 - 1.3 K/UL     ABS. EOSINOPHILS 0.2 0.0 - 0.8 K/UL     ABS.  BASOPHILS 0.1 0.0 - 0.2 K/UL     RBC COMMENTS NORMOCYTIC/NORMOCHROMIC       PLATELET COMMENTS ADEQUATE       DF MANUAL     METABOLIC PANEL, COMPREHENSIVE     Collection Time: 03/13/21  2:51 PM   Result Value Ref Range     Sodium 142 136 - 145 mmol/L     Potassium 4.1 3.5 - 5.1 mmol/L     Chloride 111 (H) 98 - 107 mmol/L     CO2 30 21 - 32 mmol/L     Anion gap 1 (L) 7 - 16 mmol/L     Glucose 91 65 - 100 mg/dL     BUN 18 8 - 23 MG/DL     Creatinine 0.96 0.6 - 1.0 MG/DL     GFR est AA >60 >60 ml/min/1.73m2     GFR est non-AA >60 >60 ml/min/1.73m2     Calcium 9.1 8.3 - 10.4 MG/DL     Bilirubin, total 0.3 0.2 - 1.1 MG/DL     ALT (SGPT) 28 12 - 65 U/L     AST (SGOT) 34 15 - 37 U/L     Alk. phosphatase 95 50 - 136 U/L     Protein, total 6.6 6.3 - 8.2 g/dL     Albumin 3.4 3.2 - 4.6 g/dL     Globulin 3.2 2.3 - 3.5 g/dL     A-G Ratio 1.1 (L) 1.2 - 3.5     PROTHROMBIN TIME + INR     Collection Time: 03/13/21  2:51 PM   Result Value Ref Range     Prothrombin time 12.9 12.5 - 14.7 sec     INR 0.9     MAGNESIUM     Collection Time: 03/13/21  2:51 PM   Result Value Ref Range     Magnesium 2.1 1.8 - 2.4 mg/dL   EKG, 12 LEAD, INITIAL     Collection Time: 03/13/21  3:11 PM   Result Value Ref Range     Ventricular Rate 58 BPM     Atrial Rate 187 BPM     QRS Duration 88 ms     Q-T Interval 454 ms     QTC Calculation (Bezet) 445 ms     Calculated P Axis 60 degrees     Calculated R Axis -25 degrees     Calculated T Axis 53 degrees     Diagnosis           !! AGE AND GENDER SPECIFIC ECG ANALYSIS !!   Sinus rhythm  Low voltage QRS  Cannot rule out Anterior infarct (cited on or before 29-MAR-2015)  Abnormal ECG  When compared with ECG of 29-MAR-2015 18:35,     Confirmed by Kassie Frankel MD (), RAUDEL LOCK ([id number]) on 3/13/2021 6:13:50 PM      URINALYSIS W/ RFLX MICROSCOPIC     Collection Time: 03/13/21  4:19 PM   Result Value Ref Range     Color YELLOW       Appearance CLEAR       Specific gravity 1.030 (H) 1.001 - 1.023       pH (UA) 5.5 5.0 - 9.0       Protein Negative NEG mg/dL     Glucose Negative mg/dL     Ketone Negative NEG mg/dL     Bilirubin Negative NEG       Blood Negative NEG       Urobilinogen 0.2 0.2 - 1.0 EU/dL     Nitrites Negative NEG       Leukocyte Esterase Negative NEG     LIPID PANEL     Collection Time: 03/14/21  4:05 AM   Result Value Ref Range     LIPID PROFILE           Cholesterol, total 275 (H) <200 MG/DL     Triglyceride 500 (H) 35 - 150 MG/DL     HDL Cholesterol 35 (L) 40 - 60 MG/DL     LDL, calculated Not calculated due to elevated triglyceride level <100 MG/DL     VLDL, calculated 100 (H) 6.0 - 23.0 MG/DL     CHOL/HDL Ratio 7. 9     HEMOGLOBIN A1C WITH EAG     Collection Time: 03/14/21  4:05 AM   Result Value Ref Range     Hemoglobin A1c 5.2 4.8 - 6.0 %     Est. average glucose 103 mg/dL   LDL, DIRECT     Collection Time: 03/14/21  4:05 AM   Result Value Ref Range     LDL,Direct 141 (H) <100 mg/dl         Disposition: home    Discharge Medications:   Current Discharge Medication List      START taking these medications    Details   aspirin 81 mg chewable tablet Take 1 Tab by mouth daily for 30 days. Qty: 30 Tab, Refills: 0         CONTINUE these medications which have NOT CHANGED    Details   lidocaine (LIDODERM) 5 % Apply patch to the affected area for 12 hours a day and remove for 12 hours a day. Qty: 4 Each, Refills: 0      omeprazole (PRILOSEC) 40 mg capsule Take 40 mg by mouth.      propranolol (INDERAL) 20 mg tablet Take 20 mg by mouth.      ezetimibe (ZETIA) 10 mg tablet Take 10 mg by mouth daily. ramipril (ALTACE) 10 mg capsule Take 10 mg by mouth daily. FLUoxetine (PROZAC) 20 mg capsule Take 20 mg by mouth daily. atorvastatin (LIPITOR) 80 mg tablet Take 80 mg by mouth daily. Activity: Activity as tolerated  Diet: Cardiac Diet  Wound Care: Keep wound clean and dry    Follow-up Appointments   Procedures    FOLLOW UP VISIT Appointment in: Other (Specify) See your primary doctor in 3-5 days. See your primary doctor in 3-5 days. Standing Status:   Standing     Number of Occurrences:   1     Order Specific Question:   Appointment in     Answer: Other (Specify)     I have discussed the plan of care with patient. Time spent on discharge is 37 minutes.       Signed By: Janina Catherine MD     March 15, 2021

## 2021-07-03 ENCOUNTER — HOSPITAL ENCOUNTER (EMERGENCY)
Dept: CT IMAGING | Age: 66
Discharge: HOME | End: 2021-07-03
Attending: EMERGENCY MEDICINE
Payer: MEDICARE

## 2021-07-03 ENCOUNTER — HOSPITAL ENCOUNTER (OUTPATIENT)
Age: 66
Setting detail: OBSERVATION
Discharge: HOME | End: 2021-07-04
Attending: EMERGENCY MEDICINE | Admitting: FAMILY MEDICINE
Payer: MEDICARE

## 2021-07-03 ENCOUNTER — APPOINTMENT (OUTPATIENT)
Dept: CT IMAGING | Age: 66
End: 2021-07-03
Attending: EMERGENCY MEDICINE
Payer: MEDICARE

## 2021-07-03 DIAGNOSIS — R20.0 RIGHT FACIAL NUMBNESS: ICD-10-CM

## 2021-07-03 DIAGNOSIS — R47.01 EXPRESSIVE APHASIA SYNDROME: ICD-10-CM

## 2021-07-03 DIAGNOSIS — R29.90 STROKE-LIKE SYMPTOMS: Primary | ICD-10-CM

## 2021-07-03 DIAGNOSIS — R25.1 TREMOR: ICD-10-CM

## 2021-07-03 DIAGNOSIS — R20.0 RIGHT SIDED NUMBNESS: ICD-10-CM

## 2021-07-03 LAB
ALBUMIN SERPL-MCNC: 3.3 G/DL (ref 3.2–4.6)
ALBUMIN/GLOB SERPL: 1 {RATIO} (ref 1.2–3.5)
ALP SERPL-CCNC: 107 U/L (ref 50–136)
ALT SERPL-CCNC: 25 U/L (ref 12–65)
ANION GAP SERPL CALC-SCNC: 6 MMOL/L (ref 7–16)
AST SERPL-CCNC: 27 U/L (ref 15–37)
BASOPHILS # BLD: 0.1 K/UL (ref 0–0.2)
BASOPHILS NFR BLD: 1 % (ref 0–2)
BILIRUB SERPL-MCNC: 0.3 MG/DL (ref 0.2–1.1)
BUN SERPL-MCNC: 20 MG/DL (ref 8–23)
CALCIUM SERPL-MCNC: 8.6 MG/DL (ref 8.3–10.4)
CHLORIDE SERPL-SCNC: 110 MMOL/L (ref 98–107)
CO2 SERPL-SCNC: 28 MMOL/L (ref 21–32)
CREAT SERPL-MCNC: 0.76 MG/DL (ref 0.6–1)
DIFFERENTIAL METHOD BLD: ABNORMAL
EOSINOPHIL # BLD: 0.2 K/UL (ref 0–0.8)
EOSINOPHIL NFR BLD: 2 % (ref 0.5–7.8)
ERYTHROCYTE [DISTWIDTH] IN BLOOD BY AUTOMATED COUNT: 13.6 % (ref 11.9–14.6)
GLOBULIN SER CALC-MCNC: 3.4 G/DL (ref 2.3–3.5)
GLUCOSE BLD STRIP.AUTO-MCNC: 96 MG/DL (ref 65–100)
GLUCOSE SERPL-MCNC: 87 MG/DL (ref 65–100)
HCT VFR BLD AUTO: 32.9 % (ref 35.8–46.3)
HGB BLD-MCNC: 11 G/DL (ref 11.7–15.4)
IMM GRANULOCYTES # BLD AUTO: 0 K/UL (ref 0–0.5)
IMM GRANULOCYTES NFR BLD AUTO: 0 % (ref 0–5)
INR BLD: 1.1 (ref 0.9–1.2)
INR PPP: 1
LYMPHOCYTES # BLD: 4.3 K/UL (ref 0.5–4.6)
LYMPHOCYTES NFR BLD: 48 % (ref 13–44)
MCH RBC QN AUTO: 32.4 PG (ref 26.1–32.9)
MCHC RBC AUTO-ENTMCNC: 33.4 G/DL (ref 31.4–35)
MCV RBC AUTO: 97.1 FL (ref 79.6–97.8)
MONOCYTES # BLD: 0.5 K/UL (ref 0.1–1.3)
MONOCYTES NFR BLD: 6 % (ref 4–12)
NEUTS SEG # BLD: 3.9 K/UL (ref 1.7–8.2)
NEUTS SEG NFR BLD: 43 % (ref 43–78)
NRBC # BLD: 0 K/UL (ref 0–0.2)
PLATELET # BLD AUTO: 193 K/UL (ref 150–450)
PMV BLD AUTO: 10.3 FL (ref 9.4–12.3)
POTASSIUM SERPL-SCNC: 3.5 MMOL/L (ref 3.5–5.1)
PROT SERPL-MCNC: 6.7 G/DL (ref 6.3–8.2)
PROTHROMBIN TIME: 13.4 SEC (ref 12.5–14.7)
PT BLD: 13 SECS (ref 9.6–11.6)
RBC # BLD AUTO: 3.39 M/UL (ref 4.05–5.2)
SERVICE CMNT-IMP: NORMAL
SODIUM SERPL-SCNC: 144 MMOL/L (ref 136–145)
TROPONIN-HIGH SENSITIVITY: 14.3 PG/ML (ref 0–14)
TROPONIN-HIGH SENSITIVITY: 14.7 PG/ML (ref 0–14)
WBC # BLD AUTO: 9 K/UL (ref 4.3–11.1)

## 2021-07-03 PROCEDURE — 99218 HC RM OBSERVATION: CPT

## 2021-07-03 PROCEDURE — 84484 ASSAY OF TROPONIN QUANT: CPT

## 2021-07-03 PROCEDURE — 85610 PROTHROMBIN TIME: CPT

## 2021-07-03 PROCEDURE — 74011250637 HC RX REV CODE- 250/637: Performed by: EMERGENCY MEDICINE

## 2021-07-03 PROCEDURE — 74011000636 HC RX REV CODE- 636: Performed by: EMERGENCY MEDICINE

## 2021-07-03 PROCEDURE — 36415 COLL VENOUS BLD VENIPUNCTURE: CPT

## 2021-07-03 PROCEDURE — 80053 COMPREHEN METABOLIC PANEL: CPT

## 2021-07-03 PROCEDURE — 93005 ELECTROCARDIOGRAM TRACING: CPT | Performed by: EMERGENCY MEDICINE

## 2021-07-03 PROCEDURE — 0042T CT PERF W CBF: CPT

## 2021-07-03 PROCEDURE — 85025 COMPLETE CBC W/AUTO DIFF WBC: CPT

## 2021-07-03 PROCEDURE — 99285 EMERGENCY DEPT VISIT HI MDM: CPT

## 2021-07-03 PROCEDURE — 74011000302 HC RX REV CODE- 302: Performed by: FAMILY MEDICINE

## 2021-07-03 PROCEDURE — 86580 TB INTRADERMAL TEST: CPT | Performed by: FAMILY MEDICINE

## 2021-07-03 PROCEDURE — 70450 CT HEAD/BRAIN W/O DYE: CPT

## 2021-07-03 PROCEDURE — 70496 CT ANGIOGRAPHY HEAD: CPT

## 2021-07-03 PROCEDURE — 74011250637 HC RX REV CODE- 250/637: Performed by: FAMILY MEDICINE

## 2021-07-03 PROCEDURE — 82962 GLUCOSE BLOOD TEST: CPT

## 2021-07-03 PROCEDURE — 74011000258 HC RX REV CODE- 258: Performed by: EMERGENCY MEDICINE

## 2021-07-03 RX ORDER — LISINOPRIL 20 MG/1
40 TABLET ORAL DAILY
Status: DISCONTINUED | OUTPATIENT
Start: 2021-07-04 | End: 2021-07-03

## 2021-07-03 RX ORDER — PANTOPRAZOLE SODIUM 40 MG/1
40 TABLET, DELAYED RELEASE ORAL DAILY
Status: DISCONTINUED | OUTPATIENT
Start: 2021-07-04 | End: 2021-07-04 | Stop reason: HOSPADM

## 2021-07-03 RX ORDER — ONDANSETRON 4 MG/1
4 TABLET, ORALLY DISINTEGRATING ORAL
Status: DISCONTINUED | OUTPATIENT
Start: 2021-07-03 | End: 2021-07-04 | Stop reason: HOSPADM

## 2021-07-03 RX ORDER — GUAIFENESIN 100 MG/5ML
81 LIQUID (ML) ORAL DAILY
Status: DISCONTINUED | OUTPATIENT
Start: 2021-07-04 | End: 2021-07-04 | Stop reason: HOSPADM

## 2021-07-03 RX ORDER — GABAPENTIN 300 MG/1
300 CAPSULE ORAL 2 TIMES DAILY
COMMUNITY

## 2021-07-03 RX ORDER — POLYETHYLENE GLYCOL 3350 17 G/17G
17 POWDER, FOR SOLUTION ORAL DAILY PRN
Status: DISCONTINUED | OUTPATIENT
Start: 2021-07-03 | End: 2021-07-04 | Stop reason: HOSPADM

## 2021-07-03 RX ORDER — EZETIMIBE 10 MG/1
10 TABLET ORAL DAILY
Status: DISCONTINUED | OUTPATIENT
Start: 2021-07-03 | End: 2021-07-04 | Stop reason: HOSPADM

## 2021-07-03 RX ORDER — PROPRANOLOL HYDROCHLORIDE 10 MG/1
20 TABLET ORAL DAILY
Status: DISCONTINUED | OUTPATIENT
Start: 2021-07-04 | End: 2021-07-04

## 2021-07-03 RX ORDER — LISINOPRIL 20 MG/1
40 TABLET ORAL DAILY
Status: DISCONTINUED | OUTPATIENT
Start: 2021-07-04 | End: 2021-07-04 | Stop reason: HOSPADM

## 2021-07-03 RX ORDER — ATORVASTATIN CALCIUM 80 MG/1
80 TABLET, FILM COATED ORAL DAILY
Status: DISCONTINUED | OUTPATIENT
Start: 2021-07-03 | End: 2021-07-04 | Stop reason: HOSPADM

## 2021-07-03 RX ORDER — CLOPIDOGREL BISULFATE 75 MG/1
150 TABLET ORAL
Status: COMPLETED | OUTPATIENT
Start: 2021-07-03 | End: 2021-07-03

## 2021-07-03 RX ORDER — ONDANSETRON 2 MG/ML
4 INJECTION INTRAMUSCULAR; INTRAVENOUS
Status: DISCONTINUED | OUTPATIENT
Start: 2021-07-03 | End: 2021-07-04 | Stop reason: HOSPADM

## 2021-07-03 RX ORDER — PHENTERMINE HYDROCHLORIDE 37.5 MG/1
37.5 TABLET ORAL
COMMUNITY
End: 2022-01-06 | Stop reason: ALTCHOICE

## 2021-07-03 RX ORDER — LIDOCAINE 4 G/100G
1 PATCH TOPICAL EVERY 24 HOURS
Status: DISCONTINUED | OUTPATIENT
Start: 2021-07-03 | End: 2021-07-04 | Stop reason: HOSPADM

## 2021-07-03 RX ORDER — ROPINIROLE 1 MG/1
1 TABLET, FILM COATED ORAL
COMMUNITY

## 2021-07-03 RX ORDER — FLUOXETINE 10 MG/1
20 CAPSULE ORAL DAILY
Status: DISCONTINUED | OUTPATIENT
Start: 2021-07-04 | End: 2021-07-04 | Stop reason: HOSPADM

## 2021-07-03 RX ORDER — SODIUM CHLORIDE 0.9 % (FLUSH) 0.9 %
5-40 SYRINGE (ML) INJECTION AS NEEDED
Status: DISCONTINUED | OUTPATIENT
Start: 2021-07-03 | End: 2021-07-04 | Stop reason: HOSPADM

## 2021-07-03 RX ORDER — ACETAMINOPHEN 650 MG/1
650 SUPPOSITORY RECTAL
Status: DISCONTINUED | OUTPATIENT
Start: 2021-07-03 | End: 2021-07-04 | Stop reason: HOSPADM

## 2021-07-03 RX ORDER — FAMOTIDINE 20 MG/1
20 TABLET, FILM COATED ORAL
Status: DISCONTINUED | OUTPATIENT
Start: 2021-07-03 | End: 2021-07-04 | Stop reason: HOSPADM

## 2021-07-03 RX ORDER — SODIUM CHLORIDE 0.9 % (FLUSH) 0.9 %
5-40 SYRINGE (ML) INJECTION EVERY 8 HOURS
Status: DISCONTINUED | OUTPATIENT
Start: 2021-07-03 | End: 2021-07-04 | Stop reason: HOSPADM

## 2021-07-03 RX ORDER — ENOXAPARIN SODIUM 100 MG/ML
40 INJECTION SUBCUTANEOUS DAILY
Status: DISCONTINUED | OUTPATIENT
Start: 2021-07-04 | End: 2021-07-04 | Stop reason: HOSPADM

## 2021-07-03 RX ORDER — FACIAL-BODY WIPES
10 EACH TOPICAL DAILY PRN
Status: DISCONTINUED | OUTPATIENT
Start: 2021-07-03 | End: 2021-07-04 | Stop reason: HOSPADM

## 2021-07-03 RX ORDER — LABETALOL HYDROCHLORIDE 5 MG/ML
5 INJECTION, SOLUTION INTRAVENOUS
Status: DISCONTINUED | OUTPATIENT
Start: 2021-07-03 | End: 2021-07-04 | Stop reason: HOSPADM

## 2021-07-03 RX ORDER — CLOPIDOGREL BISULFATE 75 MG/1
75 TABLET ORAL DAILY
Status: DISCONTINUED | OUTPATIENT
Start: 2021-07-04 | End: 2021-07-04 | Stop reason: HOSPADM

## 2021-07-03 RX ORDER — SODIUM CHLORIDE 0.9 % (FLUSH) 0.9 %
10 SYRINGE (ML) INJECTION
Status: COMPLETED | OUTPATIENT
Start: 2021-07-03 | End: 2021-07-03

## 2021-07-03 RX ORDER — ACETAMINOPHEN 325 MG/1
650 TABLET ORAL
Status: DISCONTINUED | OUTPATIENT
Start: 2021-07-03 | End: 2021-07-04 | Stop reason: HOSPADM

## 2021-07-03 RX ADMIN — EZETIMIBE 10 MG: 10 TABLET ORAL at 21:36

## 2021-07-03 RX ADMIN — IOPAMIDOL 100 ML: 755 INJECTION, SOLUTION INTRAVENOUS at 16:47

## 2021-07-03 RX ADMIN — SODIUM CHLORIDE 100 ML: 900 INJECTION, SOLUTION INTRAVENOUS at 16:47

## 2021-07-03 RX ADMIN — Medication 10 ML: at 16:47

## 2021-07-03 RX ADMIN — CLOPIDOGREL BISULFATE 150 MG: 75 TABLET ORAL at 17:27

## 2021-07-03 RX ADMIN — Medication 10 ML: at 21:13

## 2021-07-03 RX ADMIN — ATORVASTATIN CALCIUM 80 MG: 80 TABLET, FILM COATED ORAL at 21:36

## 2021-07-03 RX ADMIN — TUBERCULIN PURIFIED PROTEIN DERIVATIVE 5 UNITS: 5 INJECTION, SOLUTION INTRADERMAL at 21:36

## 2021-07-03 NOTE — ED NOTES
TRANSFER - OUT REPORT:    Verbal report given to Robert Mathur RN on [address] [facility] [address] [address]  being transferred to [address] [place] [address] for routine progression of care       Report consisted of patients Situation, Background, Assessment and   Recommendations(SBAR). Information from the following report(s) SBAR was reviewed with the receiving nurse. Lines:   Peripheral IV 07/03/21 Left Antecubital (Active)        Opportunity for questions and clarification was provided.       Patient transported with:   Registered Nurse

## 2021-07-03 NOTE — ED PROVIDER NOTES
49-year-old female with history of hypertension, GERD, depression, anxiety, tremors who presents with complaint of right facial, right upper extremity numbness and tingling that for started around 1400 today. Denies headache, nausea, vomiting, fever, chills, chest pain, shortness of breath, abdominal pain. Patient also reports that she had episode with difficulty speaking but that it has since resolved. Also states that she has been having some dizziness issues and needs assistance with  to ambulate. States that she took 2 clopidogrel 70 mg from  prior to arrival.  Patient admitted with strokelike symptoms in March 2021. The history is provided by the patient. No  was used. Numbness  This is a new problem. The current episode started 1 to 2 hours ago. The problem has not changed since onset. There was right facial and right upper extremity focality noted. Primary symptoms include loss of sensation, slurred speech and speech difficulty. Pertinent negatives include no memory loss, no movement disorder, no agitation, no visual change, no auditory change, no mental status change, no unresponsiveness and no disorientation. There has been no fever. Pertinent negatives include no shortness of breath, no chest pain, no vomiting, no altered mental status, no confusion, no headaches and no nausea. Past Medical History:   Diagnosis Date    Anxiety     Depression     GERD (gastroesophageal reflux disease)     HTN (hypertension)     Insomnia     Panic attack     Tremors of nervous system        Past Surgical History:   Procedure Laterality Date    HX APPENDECTOMY      HX CARPAL TUNNEL RELEASE      bilateral    HX LITHOTRIPSY      HX ORTHOPAEDIC      lower back    HX OTHER SURGICAL      partial bowel removal as child    HX TUBAL LIGATION           No family history on file.     Social History     Socioeconomic History    Marital status:      Spouse name: Not on file    Number of children: Not on file    Years of education: Not on file    Highest education level: Not on file   Occupational History    Not on file   Tobacco Use    Smoking status: Former Smoker     Quit date: 3/29/2013     Years since quittin.2    Smokeless tobacco: Never Used   Substance and Sexual Activity    Alcohol use: Yes     Comment: rare    Drug use: No    Sexual activity: Not on file   Other Topics Concern    Not on file   Social History Narrative    Not on file     Social Determinants of Health     Financial Resource Strain:     Difficulty of Paying Living Expenses:    Food Insecurity:     Worried About Running Out of Food in the Last Year:     [address] in the Last Year:    Transportation Needs:     Lack of Transportation (Medical):  Lack of Transportation (Non-Medical):    Physical Activity:     Days of Exercise per Week:     Minutes of Exercise per Session:    Stress:     Feeling of Stress :    Social Connections:     Frequency of Communication with Friends and Family:     Frequency of Social Gatherings with Friends and Family:     Attends Gnosticism Services:     Active Member of Clubs or Organizations:     Attends Club or Organization Meetings:     Marital Status:    Intimate Partner Violence:     Fear of Current or Ex-Partner:     Emotionally Abused:     Physically Abused:     Sexually Abused: ALLERGIES: Ambien [zolpidem]    Review of Systems   Constitutional: Negative for chills, diaphoresis, fatigue and fever. HENT: Negative for congestion, rhinorrhea, trouble swallowing and voice change. Eyes: Negative for photophobia and visual disturbance. Respiratory: Negative for cough and shortness of breath. Cardiovascular: Negative for chest pain and palpitations. Gastrointestinal: Negative for abdominal pain, diarrhea, nausea and vomiting. Genitourinary: Negative for dysuria, flank pain and hematuria.    Musculoskeletal: Positive for gait problem. Negative for back pain and myalgias. Skin: Negative for color change and rash. Neurological: Positive for speech difficulty, weakness and numbness. Negative for dizziness, tremors, seizures, syncope, facial asymmetry, light-headedness and headaches. Hematological: Does not bruise/bleed easily. Psychiatric/Behavioral: Negative for agitation, confusion and memory loss. Vitals:    07/03/21 1546 07/03/21 1601   BP: (!) 150/77 127/61   Pulse: 98 (!) 55   Resp: 16 18   Temp: 98.3 °F (36.8 °C)    SpO2: 97% 98%   Weight: 81.6 kg (180 lb)    Height: 5' 5\" (1.651 m)             Physical Exam  Vitals and nursing note reviewed. Constitutional:       Appearance: Normal appearance. HENT:      Head: Normocephalic. Comments: No facial droop. Nose: Nose normal.      Mouth/Throat:      Mouth: Mucous membranes are moist.   Eyes:      Extraocular Movements: Extraocular movements intact. Pupils: Pupils are equal, round, and reactive to light. Cardiovascular:      Rate and Rhythm: Regular rhythm. Bradycardia present. Pulses: Normal pulses. Heart sounds: Normal heart sounds. Comments: Pulses 2+ throughout. Pulmonary:      Effort: Pulmonary effort is normal.      Breath sounds: Normal breath sounds. Comments: CTAB. Abdominal:      General: Bowel sounds are normal.      Palpations: Abdomen is soft. Tenderness: There is no abdominal tenderness. There is no guarding or rebound. Comments: Soft, nontender, nondistended. No rebound or guarding. Musculoskeletal:         General: No tenderness. Normal range of motion. Cervical back: Normal range of motion. No rigidity. Skin:     General: Skin is warm. Findings: No erythema or rash. Neurological:      Mental Status: She is alert and oriented to person, place, and time. Cranial Nerves: No cranial nerve deficit. Comments: Subjective decreased sensation to the right side of face.   Subjective decreased sensation to right upper extremity. No facial droop. No dysarthria or aphasia. No drift. MDM  Number of Diagnoses or Management Options  Stroke-like symptoms: new and requires workup  Diagnosis management comments: Patient evaluated in triage. Code S called. NIHSS 1/2. Awaiting Tele-neurology evaluation. Patient admitted with similar symptoms back in March 2021. States the patient is not a candidate for TPA. CT head with no acute or concerning findings. CTA with no LVO. CTP w/ no hypoperfusion. Neurology recommends giving Plavix 150 mg as patient took 150 mg earlier today to make a total of 300 mg. Recommends hospitalist consultation for admission. Amount and/or Complexity of Data Reviewed  Clinical lab tests: ordered and reviewed  Tests in the radiology section of CPT®: reviewed and ordered  Tests in the medicine section of CPT®: ordered and reviewed  Review and summarize past medical records: yes  Discuss the patient with other providers: yes  Independent visualization of images, tracings, or specimens: yes    Risk of Complications, Morbidity, and/or Mortality  Presenting problems: high  Diagnostic procedures: high  Management options: high    Patient Progress  Patient progress: stable    ED Course as of Jul 03 1749   Sat Jul 03, 2021   1615 CT head FINDINGS:   Normal appearance of the brain parenchyma without evidence of acute infarction, hemorrhage, or mass lesion. No hydrocephalus or midline shift. No extra-axial mass or hemorrhage. The basal cisterns are patent.      The visualized portions of the orbits are normal. The mastoid air cells and paranasal sinuses are patent.     The visualized vascular structures have an unremarkable noncontrast appearance.     The calvarium and soft tissues appear normal.     IMPRESSION  No acute intracranial abnormality evident by CT.    [DF]   1725 CT perfusion IMPRESSION: No CT evidence of acute perfusion abnormality.     [DF]   0435 As patient took 150 mg of Plavix earlier today recommends giving additional 150 mg of Plavix to make total of 300 mg.     [DF]   1749 CTA head and neck IMPRESSION  1. Negative CTA exam of the major cervical arterial vasculature for significant  stenosis or occlusion.     2. Negative CTA exam of the major intracranial arterial vasculature for significant proximal vessel stenosis, occlusion, or aneurysms.       [DF]      ED Course User Index  [DF] Deann Stubbs MD       EKG    Date/Time: 7/3/2021 4:07 PM  Performed by: Deann Stubbs MD  Authorized by: Deann Stubbs MD     ECG reviewed by ED Physician in the absence of a cardiologist: yes    Rate:     ECG rate:  56    ECG rate assessment: bradycardic    Rhythm:     Rhythm: sinus bradycardia    Ectopy:     Ectopy: none    QRS:     QRS axis:  Normal    QRS intervals:  Normal  Conduction:     Conduction: normal    ST segments:     ST segments:  Normal  T waves:     T waves: normal          NIHSS Stroke Scale      Interval: Baseline  Time: 4:10 PM    Person Administering Scale: Mark Salazar MD  Administer stroke scale items in the order listed. Record performance in each category after each subscale exam. Do not go back and change scores. Follow directions provided for each exam technique. Scores should reflect what the patient does, not what the clinician thinks the patient can do. The clinician should record answers while administering the exam and work quickly. Except where indicated, the patient should not be coached (i.e., repeated requests to patient to make a special effort). 1a  Level of consciousness: 0=alert; keenly responsive   1b. LOC questions:  0=Answers both questions correctly   1c. LOC commands: 0=Performs both tasks correctly   2. Best Gaze: 0=normal   3. Visual: 0=No visual loss   4. Facial Palsy: 0=Normal symmetric movement   5a.  Motor left arm: 0=No drift, arm holds 90 (or 45) degrees for full 10 seconds   5b. Motor right arm: 0=No drift, arm holds 90 (or 45) degrees for full 10 seconds   6a. Motor left le=No drift; leg holds 30-degree position for full 5 seconds. 6b  Motor right le=No drift; leg holds 30-degree position for full 5 seconds. 7. Limb Ataxia: 0=Absent   8. Sensory: 1=Mild to moderate sensory loss; patient feels pinprick is less sharp or is dull on the affected side; or there is a loss of superficial pain with pinprick but patient is aware of being touched. 9. Best Language:  0=No aphasia, normal   10. Dysarthria: 0=Normal   11. Extinction and Inattention: 0=No abnormality    Total:   1-4= Minor stroke         Results Include:    Recent Results (from the past 24 hour(s))   GLUCOSE, POC    Collection Time: 21  3:45 PM   Result Value Ref Range    Glucose (POC) 96 65 - 100 mg/dL    Performed by MD-IT    POC PT/INR    Collection Time: 21  3:47 PM   Result Value Ref Range    Prothrombin time (POC) 13.0 (H) 9.6 - 11.6 SECS    INR (POC) 1.1 0.9 - 1.2     CBC WITH AUTOMATED DIFF    Collection Time: 21  3:50 PM   Result Value Ref Range    WBC 9.0 4.3 - 11.1 K/uL    RBC 3.39 (L) 4.05 - 5.2 M/uL    HGB 11.0 (L) 11.7 - 15.4 g/dL    HCT 32.9 (L) 35.8 - 46.3 %    MCV 97.1 79.6 - 97.8 FL    MCH 32.4 26.1 - 32.9 PG    MCHC 33.4 31.4 - 35.0 g/dL    RDW 13.6 11.9 - 14.6 %    PLATELET 016 838 - 613 K/uL    MPV 10.3 9.4 - 12.3 FL    ABSOLUTE NRBC 0.00 0.0 - 0.2 K/uL    DF AUTOMATED      NEUTROPHILS 43 43 - 78 %    LYMPHOCYTES 48 (H) 13 - 44 %    MONOCYTES 6 4.0 - 12.0 %    EOSINOPHILS 2 0.5 - 7.8 %    BASOPHILS 1 0.0 - 2.0 %    IMMATURE GRANULOCYTES 0 0.0 - 5.0 %    ABS. NEUTROPHILS 3.9 1.7 - 8.2 K/UL    ABS. LYMPHOCYTES 4.3 0.5 - 4.6 K/UL    ABS. MONOCYTES 0.5 0.1 - 1.3 K/UL    ABS. EOSINOPHILS 0.2 0.0 - 0.8 K/UL    ABS. BASOPHILS 0.1 0.0 - 0.2 K/UL    ABS. IMM.  GRANS. 0.0 0.0 - 0.5 K/UL   PROTHROMBIN TIME + INR    Collection Time: 21  3:50 PM   Result Value Ref Range    Prothrombin time 13.4 12.5 - 14.7 sec    INR 1.0     METABOLIC PANEL, COMPREHENSIVE    Collection Time: 07/03/21  3:50 PM   Result Value Ref Range    Sodium 144 136 - 145 mmol/L    Potassium 3.5 3.5 - 5.1 mmol/L    Chloride 110 (H) 98 - 107 mmol/L    CO2 28 21 - 32 mmol/L    Anion gap 6 (L) 7 - 16 mmol/L    Glucose 87 65 - 100 mg/dL    BUN 20 8 - 23 MG/DL    Creatinine 0.76 0.6 - 1.0 MG/DL    GFR est AA >60 >60 ml/min/1.73m2    GFR est non-AA >60 >60 ml/min/1.73m2    Calcium 8.6 8.3 - 10.4 MG/DL    Bilirubin, total 0.3 0.2 - 1.1 MG/DL    ALT (SGPT) 25 12 - 65 U/L    AST (SGOT) 27 15 - 37 U/L    Alk. phosphatase 107 50 - 136 U/L    Protein, total 6.7 6.3 - 8.2 g/dL    Albumin 3.3 3.2 - 4.6 g/dL    Globulin 3.4 2.3 - 3.5 g/dL    A-G Ratio 1.0 (L) 1.2 - 3.5     TROPONIN-HIGH SENSITIVITY    Collection Time: 07/03/21  3:50 PM   Result Value Ref Range    Troponin-High Sensitivity 14.7 (H) 0 - 14 pg/mL          Mark Chiang MD; 7/3/2021 @4:07 PM Voice dictation software was used during the making of this note. This software is not perfect and grammatical and other typographical errors may be present.   This note has not been proofread for errors.  ===================================================================

## 2021-07-03 NOTE — ED TRIAGE NOTES
Pt ambulatory to triage. Pt states about 1400 she felt the right side of face swell and was having problems talking. Pt  states this has happened about 3 times this year. Pt states she feels the s/sx is resolving. Right face sensory deficit noted on assessment, no drift or ataxia noted to limbs. Right  deficit noted. Speech is garbled. Pt was having gait disturbances on walk to triage.

## 2021-07-03 NOTE — H&P
Hospitalist Admission History and Physical     NAME:  Nicole Puckett Masters   Age:  74 y.o.  :   1955   MRN:   [id number]  PCP: Savannah Campbell MD  Consulting MD:  Treatment Team: Attending Provider: Thu Stokes DO; Primary Nurse: Jovana Clay RN    Chief Complaint   Patient presents with    Numbness         HPI:   Patient is a 74 y.o. female who presented to the ED for cc right facial droop and slurred speech since 2pm. Hx of anxiety, GERD, HTN. NIH 1. Was dc for similar concerns 3/15/21. ECHO then showed no shunting. Tele neuro recommends 150mg Plavix now since she has already taken 150mg of her 's meds and start Plavix/ASA for 21 days. High dose statin    Vitals- stable    Labs- Benign    CT and CTA head negative for significant findings. Past Medical History:   Diagnosis Date    Anxiety     Depression     GERD (gastroesophageal reflux disease)     HTN (hypertension)     Insomnia     Panic attack     Tremors of nervous system         Past Surgical History:   Procedure Laterality Date    HX APPENDECTOMY      HX CARPAL TUNNEL RELEASE      bilateral    HX LITHOTRIPSY      HX ORTHOPAEDIC      lower back    HX OTHER SURGICAL      partial bowel removal as child    HX TUBAL LIGATION          No family history on file. Social History     Social History Narrative    Not on file        Social History     Tobacco Use    Smoking status: Former Smoker     Quit date: 3/29/2013     Years since quittin.2    Smokeless tobacco: Never Used   Substance Use Topics    Alcohol use: Yes     Comment: rare        Social History     Substance and Sexual Activity   Drug Use No         Allergies   Allergen Reactions    Ambien [Zolpidem] Other (comments)     hallucinations       Prior to Admission medications    Medication Sig Start Date End Date Taking? Authorizing Provider   lidocaine (LIDODERM) 5 % Apply patch to the affected area for 12 hours a day and remove for 12 hours a day. 1/27/19   William Kraft MD   omeprazole (PRILOSEC) 40 mg capsule Take 40 mg by mouth. Izabel Montoya MD   propranolol (INDERAL) 20 mg tablet Take 20 mg by mouth. Izabel Montoya MD   ezetimibe (ZETIA) 10 mg tablet Take 10 mg by mouth daily. Izabel Montoya MD   ramipril (ALTACE) 10 mg capsule Take 10 mg by mouth daily. Izabel Montoya MD   FLUoxetine (PROZAC) 20 mg capsule Take 20 mg by mouth daily. Izabel Montoya MD   atorvastatin (LIPITOR) 80 mg tablet Take 80 mg by mouth daily. Izabel Montoya MD           Review of Systems    Constitutional:  NAD  Eyes:  no change in visual acuity, no photophobia  Ears, nose, mouth, throat, and face: no  Odynphagia, dysphagia, no thrush or exudate, negative for chronic sinus congestion, recurrent headaches  Respiratory: negative for SOB, hemoptysis or cough  Cardiovascular: negative for CP, palpitations, or PND  Gastrointestinal: negative for abdominal pain, no hematemesis, hematochezia or BRBPR  Genitourinary: no urgency, frequency, or dysuria, no nocturia  Integument/breast: negative for skin rash or skin lesions  Hematologic/lymphatic: negative for known bleeding disorder  Musculoskeletal:negative for joint pain or joint tenderness  Neurological: Right sided facial numbness. Behavioral/Psych: negative for depression or chronic anxiety,   Endocrine: negative for polydyspia, polyuria or intolerance to heat or cold  Allergic/Immunologic: negative for chronic allergic rhinitis, or known connective tissue disorder      Objective:     Visit Vitals  /61   Pulse (!) 55   Temp 98.3 °F (36.8 °C)   Resp 18   Ht 5' 5\" (1.651 m)   Wt 81.6 kg (180 lb)   SpO2 98%   BMI 29.95 kg/m²        No intake/output data recorded. No intake/output data recorded.     Data Review:   Recent Results (from the past 24 hour(s))   GLUCOSE, POC    Collection Time: 07/03/21  3:45 PM   Result Value Ref Range    Glucose (POC) 96 65 - 100 mg/dL    [facility]    POC PT/INR Collection Time: 07/03/21  3:47 PM   Result Value Ref Range    Prothrombin time (POC) 13.0 (H) 9.6 - 11.6 SECS    INR (POC) 1.1 0.9 - 1.2     CBC WITH AUTOMATED DIFF    Collection Time: 07/03/21  3:50 PM   Result Value Ref Range    WBC 9.0 4.3 - 11.1 K/uL    RBC 3.39 (L) 4.05 - 5.2 M/uL    HGB 11.0 (L) 11.7 - 15.4 g/dL    HCT 32.9 (L) 35.8 - 46.3 %    MCV 97.1 79.6 - 97.8 FL    MCH 32.4 26.1 - 32.9 PG    MCHC 33.4 31.4 - 35.0 g/dL    RDW 13.6 11.9 - 14.6 %    PLATELET 243 079 - 520 K/uL    MPV 10.3 9.4 - 12.3 FL    ABSOLUTE NRBC 0.00 0.0 - 0.2 K/uL    DF AUTOMATED      NEUTROPHILS 43 43 - 78 %    LYMPHOCYTES 48 (H) 13 - 44 %    MONOCYTES 6 4.0 - 12.0 %    EOSINOPHILS 2 0.5 - 7.8 %    BASOPHILS 1 0.0 - 2.0 %    IMMATURE GRANULOCYTES 0 0.0 - 5.0 %    ABS. NEUTROPHILS 3.9 1.7 - 8.2 K/UL    ABS. LYMPHOCYTES 4.3 0.5 - 4.6 K/UL    ABS. MONOCYTES 0.5 0.1 - 1.3 K/UL    ABS. EOSINOPHILS 0.2 0.0 - 0.8 K/UL    ABS. BASOPHILS 0.1 0.0 - 0.2 K/UL    ABS. IMM. GRANS. 0.0 0.0 - 0.5 K/UL   PROTHROMBIN TIME + INR    Collection Time: 07/03/21  3:50 PM   Result Value Ref Range    Prothrombin time 13.4 12.5 - 14.7 sec    INR 1.0     METABOLIC PANEL, COMPREHENSIVE    Collection Time: 07/03/21  3:50 PM   Result Value Ref Range    Sodium 144 136 - 145 mmol/L    Potassium 3.5 3.5 - 5.1 mmol/L    Chloride 110 (H) 98 - 107 mmol/L    CO2 28 21 - 32 mmol/L    Anion gap 6 (L) 7 - 16 mmol/L    Glucose 87 65 - 100 mg/dL    BUN 20 8 - 23 MG/DL    Creatinine 0.76 0.6 - 1.0 MG/DL    GFR est AA >60 >60 ml/min/1.73m2    GFR est non-AA >60 >60 ml/min/1.73m2    Calcium 8.6 8.3 - 10.4 MG/DL    Bilirubin, total 0.3 0.2 - 1.1 MG/DL    ALT (SGPT) 25 12 - 65 U/L    AST (SGOT) 27 15 - 37 U/L    Alk.  phosphatase 107 50 - 136 U/L    Protein, total 6.7 6.3 - 8.2 g/dL    Albumin 3.3 3.2 - 4.6 g/dL    Globulin 3.4 2.3 - 3.5 g/dL    A-G Ratio 1.0 (L) 1.2 - 3.5     TROPONIN-HIGH SENSITIVITY    Collection Time: 07/03/21  3:50 PM   Result Value Ref Range Troponin-High Sensitivity 14.7 (H) 0 - 14 pg/mL       Physical Exam:     General:  Alert, cooperative, no distress, appears stated age. Eyes:  Conjunctivae/corneas clear. Ears:  Normal TMs and external ear canals both ears. Nose: Nares normal. Septum midline. Mouth/Throat: Very mild right sided facial droop with mild slurred speech   Neck:  no JVD. Back:   deferred   Lungs:   Clear to auscultation bilaterally. Heart:  Regular rate and rhythm, S1, S2 normal   Abdomen:   Soft, non-tender. Bowel sounds normal. No masses,  No organomegaly. Extremities: Extremities normal, atraumatic, no cyanosis or edema. 5/5 strength to UE and LE bilaterally    Pulses: 2+ and symmetric all extremities. Skin: Skin color, texture, turgor normal. No rashes or lesions   Lymph nodes: Cervical, supraclavicular, and axillary nodes normal.   Neurologic: As above       Assessment and Plan     Principal Problem:    Right facial numbness (7/3/2021)    Active Problems:    Anxiety (3/13/2021)      HTN (hypertension) (3/13/2021)    Right sided facial droop - CVA work up. Start Plavix/ASA for 21 days. High dose statin. Will not order ECHO since done in March.      GERD - PPI    HTN - ACE but allow for permissive HTN for 24 hrs    Anxiety - propranolol, prozac    Wishes to be DNR    DVT prophylaxis - SCDs, Lovenox    Signed By: Erin Braun,    July 3, 2021

## 2021-07-03 NOTE — PROGRESS NOTES
TRANSFER - IN REPORT:    Verbal report received from Camilla Mcgrath RN(name) on Latonia Regan Masters  being received from ER(unit) for routine progression of care      Report consisted of patients Situation, Background, Assessment and   Recommendations(SBAR). Information from the following report(s) SBAR, Kardex, ED Summary, Intake/Output, MAR and Recent Results was reviewed with the receiving nurse. Opportunity for questions and clarification was provided. MRI consent not completed. Asked ER nurse to complete prior to sending patient up. Assessment will be completed upon patients arrival to unit and care assumed.

## 2021-07-03 NOTE — PROGRESS NOTES
07/03/21 1945   NIH Stroke Scale   Interval Other (comment)  (dual with Conception Bridgett)   Level of Conciousness (1a) 0   LOC Questions (1b) 0   LOC Commands (1c) 0   Best Gaze (2) 0   Visual (3) 0   Facial Palsy (4) 0   Motor Arm, Left (5a) 0   Motor Arm, Right (5b) 0   Motor Leg, Left (6a) 0   Motor Leg, Right (6b) 0   Limb Ataxia (7) 0   Sensory (8) (!) 1   Best Language (9) 0   Dysarthria (10) 0   Extinction and Inattention (11) 0   Total 1

## 2021-07-04 ENCOUNTER — APPOINTMENT (OUTPATIENT)
Dept: MRI IMAGING | Age: 66
End: 2021-07-04
Attending: FAMILY MEDICINE
Payer: MEDICARE

## 2021-07-04 VITALS
SYSTOLIC BLOOD PRESSURE: 125 MMHG | HEIGHT: 65 IN | HEART RATE: 62 BPM | OXYGEN SATURATION: 100 % | TEMPERATURE: 98.4 F | BODY MASS INDEX: 29.99 KG/M2 | DIASTOLIC BLOOD PRESSURE: 57 MMHG | RESPIRATION RATE: 16 BRPM | WEIGHT: 180 LBS

## 2021-07-04 PROBLEM — G45.9 TIA (TRANSIENT ISCHEMIC ATTACK): Status: ACTIVE | Noted: 2021-07-03

## 2021-07-04 PROBLEM — I10 HTN (HYPERTENSION): Chronic | Status: ACTIVE | Noted: 2021-03-13

## 2021-07-04 LAB
ANION GAP SERPL CALC-SCNC: 7 MMOL/L (ref 7–16)
ATRIAL RATE: 56 BPM
BUN SERPL-MCNC: 16 MG/DL (ref 8–23)
CALCIUM SERPL-MCNC: 8.3 MG/DL (ref 8.3–10.4)
CALCULATED P AXIS, ECG09: -49 DEGREES
CALCULATED R AXIS, ECG10: -36 DEGREES
CALCULATED T AXIS, ECG11: 27 DEGREES
CHLORIDE SERPL-SCNC: 112 MMOL/L (ref 98–107)
CHOLEST SERPL-MCNC: 165 MG/DL
CO2 SERPL-SCNC: 27 MMOL/L (ref 21–32)
CREAT SERPL-MCNC: 0.67 MG/DL (ref 0.6–1)
DIAGNOSIS, 93000: NORMAL
ERYTHROCYTE [DISTWIDTH] IN BLOOD BY AUTOMATED COUNT: 13.4 % (ref 11.9–14.6)
EST. AVERAGE GLUCOSE BLD GHB EST-MCNC: 103 MG/DL
GLUCOSE SERPL-MCNC: 96 MG/DL (ref 65–100)
HBA1C MFR BLD: 5.2 % (ref 4.2–6.3)
HCT VFR BLD AUTO: 31.4 % (ref 35.8–46.3)
HDLC SERPL-MCNC: 52 MG/DL (ref 40–60)
HDLC SERPL: 3.2 {RATIO}
HGB BLD-MCNC: 10.6 G/DL (ref 11.7–15.4)
LDLC SERPL CALC-MCNC: 71.2 MG/DL
MCH RBC QN AUTO: 31.9 PG (ref 26.1–32.9)
MCHC RBC AUTO-ENTMCNC: 33.8 G/DL (ref 31.4–35)
MCV RBC AUTO: 94.6 FL (ref 79.6–97.8)
NRBC # BLD: 0 K/UL (ref 0–0.2)
P-R INTERVAL, ECG05: 152 MS
PLATELET # BLD AUTO: 165 K/UL (ref 150–450)
PMV BLD AUTO: 10.6 FL (ref 9.4–12.3)
POTASSIUM SERPL-SCNC: 3.3 MMOL/L (ref 3.5–5.1)
Q-T INTERVAL, ECG07: 450 MS
QRS DURATION, ECG06: 84 MS
QTC CALCULATION (BEZET), ECG08: 434 MS
RBC # BLD AUTO: 3.32 M/UL (ref 4.05–5.2)
SODIUM SERPL-SCNC: 146 MMOL/L (ref 136–145)
TRIGL SERPL-MCNC: 209 MG/DL (ref 35–150)
VENTRICULAR RATE, ECG03: 56 BPM
VLDLC SERPL CALC-MCNC: 41.8 MG/DL (ref 6–23)
WBC # BLD AUTO: 9.5 K/UL (ref 4.3–11.1)

## 2021-07-04 PROCEDURE — 74011250636 HC RX REV CODE- 250/636: Performed by: FAMILY MEDICINE

## 2021-07-04 PROCEDURE — 80048 BASIC METABOLIC PNL TOTAL CA: CPT

## 2021-07-04 PROCEDURE — 74011250637 HC RX REV CODE- 250/637: Performed by: FAMILY MEDICINE

## 2021-07-04 PROCEDURE — 96374 THER/PROPH/DIAG INJ IV PUSH: CPT

## 2021-07-04 PROCEDURE — 36415 COLL VENOUS BLD VENIPUNCTURE: CPT

## 2021-07-04 PROCEDURE — 85027 COMPLETE CBC AUTOMATED: CPT

## 2021-07-04 PROCEDURE — 80061 LIPID PANEL: CPT

## 2021-07-04 PROCEDURE — 74011250637 HC RX REV CODE- 250/637: Performed by: INTERNAL MEDICINE

## 2021-07-04 PROCEDURE — 83036 HEMOGLOBIN GLYCOSYLATED A1C: CPT

## 2021-07-04 PROCEDURE — 74011250636 HC RX REV CODE- 250/636: Performed by: INTERNAL MEDICINE

## 2021-07-04 PROCEDURE — 96372 THER/PROPH/DIAG INJ SC/IM: CPT

## 2021-07-04 PROCEDURE — 99218 HC RM OBSERVATION: CPT

## 2021-07-04 PROCEDURE — 97165 OT EVAL LOW COMPLEX 30 MIN: CPT

## 2021-07-04 PROCEDURE — 70551 MRI BRAIN STEM W/O DYE: CPT

## 2021-07-04 PROCEDURE — 97161 PT EVAL LOW COMPLEX 20 MIN: CPT

## 2021-07-04 PROCEDURE — 99233 SBSQ HOSP IP/OBS HIGH 50: CPT | Performed by: NURSE PRACTITIONER

## 2021-07-04 RX ORDER — POTASSIUM CHLORIDE 20 MEQ/1
40 TABLET, EXTENDED RELEASE ORAL
Status: COMPLETED | OUTPATIENT
Start: 2021-07-04 | End: 2021-07-04

## 2021-07-04 RX ORDER — PROPRANOLOL HYDROCHLORIDE 40 MG/1
20 TABLET ORAL 2 TIMES DAILY
Status: DISCONTINUED | OUTPATIENT
Start: 2021-07-04 | End: 2021-07-04 | Stop reason: HOSPADM

## 2021-07-04 RX ORDER — GUAIFENESIN 100 MG/5ML
81 LIQUID (ML) ORAL DAILY
Qty: 90 TABLET | Refills: 1 | Status: SHIPPED | OUTPATIENT
Start: 2021-07-05

## 2021-07-04 RX ORDER — CLOPIDOGREL BISULFATE 75 MG/1
75 TABLET ORAL DAILY
Qty: 21 TABLET | Refills: 0 | Status: SHIPPED | OUTPATIENT
Start: 2021-07-05 | End: 2021-07-22 | Stop reason: ALTCHOICE

## 2021-07-04 RX ORDER — LORAZEPAM 2 MG/ML
1 INJECTION INTRAMUSCULAR
Status: DISCONTINUED | OUTPATIENT
Start: 2021-07-04 | End: 2021-07-04 | Stop reason: HOSPADM

## 2021-07-04 RX ADMIN — Medication 10 ML: at 06:00

## 2021-07-04 RX ADMIN — ASPIRIN 81 MG: 81 TABLET, CHEWABLE ORAL at 09:26

## 2021-07-04 RX ADMIN — CLOPIDOGREL BISULFATE 75 MG: 75 TABLET ORAL at 09:26

## 2021-07-04 RX ADMIN — LORAZEPAM 1 MG: 2 INJECTION INTRAMUSCULAR; INTRAVENOUS at 10:25

## 2021-07-04 RX ADMIN — PANTOPRAZOLE SODIUM 40 MG: 40 TABLET, DELAYED RELEASE ORAL at 09:26

## 2021-07-04 RX ADMIN — PROPRANOLOL HYDROCHLORIDE 20 MG: 40 TABLET ORAL at 09:26

## 2021-07-04 RX ADMIN — FLUOXETINE 20 MG: 10 CAPSULE ORAL at 09:26

## 2021-07-04 RX ADMIN — ENOXAPARIN SODIUM 40 MG: 40 INJECTION SUBCUTANEOUS at 09:26

## 2021-07-04 RX ADMIN — POTASSIUM CHLORIDE 40 MEQ: 20 TABLET, EXTENDED RELEASE ORAL at 11:35

## 2021-07-04 NOTE — PROGRESS NOTES
ACUTE OT GOALS:  (Developed with and agreed upon by patient and/or caregiver.)  No goals, evaluation only    OCCUPATIONAL THERAPY ASSESSMENT: Initial Assessment and Discharge OT Treatment Day #      Sweetie Musa is a 74 y.o. female   PRIMARY DIAGNOSIS: Right facial numbness  Right facial numbness [R20.0]       Reason for Referral:    ICD-10: Treatment Diagnosis: Difficulty in walking, Not elsewhere classified (R26.2)  Facial weakness (R29.810)  OBSERVATION: Payor: [address] [place]D / Plan: [address] / Product Type: Replen[employer] Medicare /   ASSESSMENT:     REHAB RECOMMENDATIONS:   Recommendation to date pending progress:  Setting:   No further skilled therapy   Equipment:    None     PRIOR LEVEL OF FUNCTION:  (Prior to Hospitalization)  INITIAL/CURRENT LEVEL OF FUNCTION:  (Based on today's evaluation)   Bathing:   Modified Independent  Dressing:   Independent  Feeding/Grooming:   Independent  Toileting:   Independent  Functional Mobility:   Modified Independent SPC PRN Bathing:   Modified Independent  Dressing:   Independent  Feeding/Grooming:   Independent  Toileting:   Independent  Functional Mobility:   Independent     ASSESSMENT:  Ms. Jacqueline Corona is a 75 y/o female presents with right facial numbness that has since resolved. Pt lives with  in a one level home with a walk in shower w/ SC. Pt uses a SPC PRN for ambulation secondary to chronic knee pain. Pt with 5/5 BUE strength and intact BUE sensation. Pt ambulated household distances in room independently and completed grooming ADLs standing at sink independently. Pt is currently functioning at her baseline and does not need further skilled OT during acute care stay. No d/c needs. SUBJECTIVE:   Ms. Jacqueline Corona states, \"I feel great today. \"    SOCIAL HISTORY/LIVING ENVIRONMENT: Pt lives with  in a one level home with a walk in shower w/ SC.  Pt uses a SPC PRN for ambulation secondary to chronic knee pain.   Home Environment: Private residence  # Steps to Enter: 0  One/Two Story Residence: One story  Living Alone: No  Support Systems: Child(wilbert), Spouse/Significant Other/Partner    OBJECTIVE:     PAIN: VITAL SIGNS: LINES/DRAINS:   Pre Treatment: Pain Screen  Pain Scale 1: Numeric (0 - 10)  Pain Intensity 1: 0  Post Treatment: 0   none  O2 Device: None (Room air)     GROSS EVALUATION:  BUE Within Functional Limits Abnormal/ Functional Abnormal/ Non-Functional (see comments) Not Tested Comments:   AROM [x] [] [] []    PROM [] [] [] []    Strength [x] [] [] []    Balance [x] [] [] []    Posture [x] [] [] []    Sensation [x] [] [] []    Coordination [x] [] [] []    Tone [] [] [] []    Edema [] [] [] []    Activity Tolerance [x] [] [] []     [] [] [] []      COGNITION/  PERCEPTION: Intact Impaired   (see comments) Comments:   Orientation [x] []    Vision [x] []    Hearing [x] []    Judgment/ Insight [x] []    Attention [x] []    Memory [x] []    Command Following [x] []    Emotional Regulation [x] []     [] []      ACTIVITIES OF DAILY LIVING: I Mod I S SBA CGA Min Mod Max Total NT Comments   BASIC ADLs:              Bathing/ Showering [] [] [] [] [] [] [] [] [] []    Toileting [] [] [] [] [] [] [] [] [] []    Dressing [] [] [] [] [] [] [] [] [] []    Feeding [] [] [] [] [] [] [] [] [] []    Grooming [x] [] [] [] [] [] [] [] [] [] Brushing teeth standing at sink   Personal Device Care [] [] [] [] [] [] [] [] [] []    Functional Mobility [x] [] [] [] [] [] [] [] [] []    I=Independent, Mod I=Modified Independent, S=Supervision, SBA=Standby Assistance, CGA=Contact Guard Assistance,   Min=Minimal Assistance, Mod=Moderate Assistance, Max=Maximal Assistance, Total=Total Assistance, NT=Not Tested    MOBILITY: I Mod I S SBA CGA Min Mod Max Total  NT x2 Comments: no use of AD   Supine to sit [x] [] [] [] [] [] [] [] [] [] []    Sit to supine [x] [] [] [] [] [] [] [] [] [] []    Sit to stand [x] [] [] [] [] [] [] [] [] [] []    Bed to chair [x] [] [] [] [] [] [] [] [] [] []    I=Independent, Mod I=Modified Independent, S=Supervision, SBA=Standby Assistance, CGA=Contact Guard Assistance,   Min=Minimal Assistance, Mod=Moderate Assistance, Max=Maximal Assistance, Total=Total Assistance, NT=Not Tested    [facility]   Daily Activity Inpatient Short Form        How much help from another person does the patient currently need. .. Total A Lot A Little None   1. Putting on and taking off regular lower body clothing? [] 1   [] 2   [] 3   [x] 4   2. Bathing (including washing, rinsing, drying)? [] 1   [] 2   [] 3   [x] 4   3. Toileting, which includes using toilet, bedpan or urinal?   [] 1   [] 2   [] 3   [x] 4   4. Putting on and taking off regular upper body clothing? [] 1   [] 2   [] 3   [x] 4   5. Taking care of personal grooming such as brushing teeth? [] 1   [] 2   [] 3   [x] 4   6. Eating meals? [] 1   [] 2   [] 3   [x] 4   © 2007, Trustees of [facility] [employer], under license to [employer]. All rights reserved     Score:  Initial: 24 Most Recent: X (Date: -- )   Interpretation of Tool:  Represents activities that are increasingly more difficult (i.e. Bed mobility, Transfers, Gait). PLAN:   FREQUENCY/DURATION: OT Plan of Care:  (eval & d/c) for duration of hospital stay or until stated goals are met, whichever comes first.    PROBLEM LIST:   (Skilled intervention is medically necessary to address:)  1. N/A   INTERVENTIONS PLANNED:   (Benefits and precautions of occupational therapy have been discussed with the patient.)  1.  N/A     TREATMENT:     EVALUATION: Low Complexity : (Untimed Charge)    TREATMENT:   (     )  N/A, evaluation only    TREATMENT GRID:  N/A    AFTER TREATMENT POSITION/PRECAUTIONS:  Chair, Needs within reach and RN notified    INTERDISCIPLINARY COLLABORATION:  RN/PCT and OT/RENTERIA    TOTAL TREATMENT DURATION:  OT Patient Time In/Time Out  Time In: 4896  Time Out: [address] [facility], OT

## 2021-07-04 NOTE — DISCHARGE INSTRUCTIONS

## 2021-07-04 NOTE — PROGRESS NOTES
07/04/21 0700   NIH Stroke Scale   Interval Other (comment)  (dual with Lupis CLARKE)   Level of Conciousness (1a) 0   LOC Questions (1b) 0   LOC Commands (1c) 0   Best Gaze (2) 0   Visual (3) 0   Facial Palsy (4) 0   Motor Arm, Left (5a) 0   Motor Arm, Right (5b) 0   Motor Leg, Left (6a) 0   Motor Leg, Right (6b) 0   Limb Ataxia (7) 0   Sensory (8) (!) 1   Best Language (9) 0   Dysarthria (10) 0   Extinction and Inattention (11) 0   Total 1

## 2021-07-04 NOTE — PROGRESS NOTES
Speech therapy note  Attempted to see pt this am, however, pt off the floor.      Flex He MA/MICHAEL/SLP

## 2021-07-04 NOTE — PROGRESS NOTES
Consult    Patient: Dorina Durán MRN: [id number]     YOB: 1955  Age: 74 y.o. Sex: female      Subjective:      Dorina Durán is a 74 y.o. female who is being seen for stroke like symptoms. PMH anxiety/depression, HTN, essential tremors. Patient stated about 2 pm yesterday, while in the kitchen she experienced sudden onset of right facial droop, right facial numbness and word finding difficulty. Stated when she turned around to speak with her , she became off balance. Stated her  made her take 2 full strength aspirins, prior to bringing her to ED. Stated her speech and facial droop resolved after 15 minutes, however the right side of her face remained numb. On arrival, Code S was initiated. She was evaluated by teleneurology. NIH 1. CT of head negative for acute infarct or hemorrhage. CTA of head/neck negative for LVO or high grade stenosis. CTP negative for core infarct. Given she experienced stroke like symptoms in March 2021 with left sided facial droop/ LUE numbness. In ED she was  she was loaded with Plavix 300 mg, and started on DAPT with ASA 81 mg po daily and Plavix 75 mg po daily. She endorsed taking ASA 81 mg and atrovastatin 80 mg po daily. Prior MRI showed chronic small vessel disease; partially empty sella with distended optic nerve sheath. TTE showed moderate LAD, negative for PFO. Past Medical History:   Diagnosis Date    Anxiety     Depression     GERD (gastroesophageal reflux disease)     HTN (hypertension)     Insomnia     Panic attack     Tremors of nervous system      Past Surgical History:   Procedure Laterality Date    HX APPENDECTOMY      HX CARPAL TUNNEL RELEASE      bilateral    HX LITHOTRIPSY      HX ORTHOPAEDIC      lower back    HX OTHER SURGICAL      partial bowel removal as child    HX TUBAL LIGATION        No family history on file.   Social History     Tobacco Use    Smoking status: Former Smoker Quit date: 3/29/2013     Years since quittin.2    Smokeless tobacco: Never Used   Substance Use Topics    Alcohol use: Yes     Comment: rare      Current Facility-Administered Medications   Medication Dose Route Frequency Provider Last Rate Last Admin    propranoloL (INDERAL) tablet 20 mg  20 mg Oral BID Aleck Hao, DO   20 mg at 21 6071    FLUoxetine (PROzac) capsule 20 mg  20 mg Oral DAILY Aleck Hao, DO   20 mg at 21 6925    pantoprazole (PROTONIX) tablet 40 mg  40 mg Oral DAILY Aleck Hao, DO   40 mg at 21 6792    [Held by provider] lisinopriL (PRINIVIL, ZESTRIL) tablet 40 mg  40 mg Oral DAILY Aleck Hao, DO        atorvastatin (LIPITOR) tablet 80 mg  80 mg Oral DAILY Aleck Hao, DO   80 mg at 21    ezetimibe (ZETIA) tablet 10 mg  10 mg Oral DAILY Aleck Hao, DO   10 mg at 21 213    sodium chloride (NS) flush 5-40 mL  5-40 mL IntraVENous Q8H Aleck Hao, DO   10 mL at 21 0600    sodium chloride (NS) flush 5-40 mL  5-40 mL IntraVENous PRN Aleck Hao, DO        acetaminophen (TYLENOL) tablet 650 mg  650 mg Oral Q6H PRN Aleck Hao, DO        Or    acetaminophen (TYLENOL) suppository 650 mg  650 mg Rectal Q6H PRN Aleck Hao, DO        polyethylene glycol (MIRALAX) packet 17 g  17 g Oral DAILY PRN Aleck Hao, DO        ondansetron (ZOFRAN ODT) tablet 4 mg  4 mg Oral Q8H PRN Aleck Hao, DO        Or    ondansetron [facility]) injection 4 mg  4 mg IntraVENous Q6H PRN Aleck Hao, DO        enoxaparin (LOVENOX) injection 40 mg  40 mg SubCUTAneous DAILY Aleck Hao, DO   40 mg at 21 7939    tuberculin injection 5 Units  5 Units IntraDERMal Junius Leicharlotte, DO   5 Units at 21    aspirin chewable tablet 81 mg  81 mg Oral DAILY Aleck Hao, DO   81 mg at 21 0926    labetaloL (NORMODYNE;TRANDATE) injection 5 mg  5 mg IntraVENous Q10MIN PRN Jurline Pew, DO        bisacodyL (DULCOLAX) suppository 10 mg  10 mg Rectal DAILY PRN Jurline Pew, DO        famotidine (PEPCID) tablet 20 mg  20 mg Oral BID PRN Jurline Pew, DO        clopidogreL (PLAVIX) tablet 75 mg  75 mg Oral DAILY Jurline Pew, DO   75 mg at 07/04/21 0368    lidocaine 4 % patch 1 Patch  1 Patch TransDERmal Q24H Jurline Pew, DO            Allergies   Allergen Reactions    Ambien [Zolpidem] Other (comments)     hallucinations       Review of Systems:  CONSTITUTIONAL: Denies weight loss, fever, chills, weakness or fatigue. HEENT:  Eyes:  Denies visual loss, blurred vision, double vision or yellow sclerae. Ears, Nose, Throat:  Denies hearing loss, sneezing, congestion, runny nose or sore throat. SKIN:  Denies rash or itching. CARDIOVASCULAR:  Denies chest pain, chest pressure or chest discomfort. No palpitations or edema. RESPIRATORY:  Denies shortness of breath, cough or sputum. GASTROINTESTINAL:  Denies anorexia, nausea, vomiting or diarrhea. No abdominal pain or blood. GENITOURINARY:  Denies burning with urination. NEUROLOGICAL:  Denies headache, dizziness, syncope, paralysis, ataxia, numbness or tingling in the extremities. Denies change in bowel or bladder control. MUSCULOSKELETAL: Chronic low back pain, hip pain and right knee pain. HEMATOLOGIC:  Denies anemia, bleeding or bruising. LYMPHATICS:  Denies enlarged nodes. PSYCHIATRIC: history anxiety. ENDOCRINOLOGIC:  Denies reports of sweating, cold or heat intolerance. No polyuria or polydipsia. ALLERGIES:  Denies history of asthma, hives, eczema or rhinitis.         Objective:     Vitals:    07/04/21 0000 07/04/21 0400 07/04/21 0800 07/04/21 0926   BP: (!) 144/65 127/62 (!) 147/71    Pulse: 63 64 66 72   Resp: 18 16 16    Temp: 98.4 °F (36.9 °C) 98 °F (36.7 °C) 97.6 °F (36.4 °C)    SpO2: 93% 96% 97%    Weight:       Height:            Physical Exam:  General - Well developed, well nourished, in no apparent distress. Tearful and conversent. HEENT - Normocephalic, atraumatic. Conjunctiva, tympanic membranes, and oropharynx are clear. Neck - Supple without masses, no bruits   Cardiovascular - Regular rate and rhythm. Normal S1, S2 without murmurs, rubs, or gallops. Lungs - Clear to auscultation. Abdomen - Soft, nontender with normal bowel sounds. Extremities - Peripheral pulses intact. No edema and no rashes. Neurological examination - Comprehension, attention, memory and reasoning are intact. Language and speech are normal.   On cranial nerve examination, (II, III, IV, VI) pupils are equal, round, and reactive to light. Visual acuity is adequate. Visual fields are full to finger confrontation. Extraocular motility is normal. (V, VII) Face is symmetric and sensation is intact to light touch. (VIII) Hearing is intact. (IX, X) Palate and uvula elevate symmetrically. Voice is normal. (XI) Shoulder shrug is strong and equal bilaterally. (XII)Tongue is midline. Motor examination - There is normal muscle tone and bulk. Power is full throughout. Muscle stretch reflexes are normoactive and there are no pathological reflexes present. Plantar response is extensor on left, flexor on right. Sensation is intact to light touch, pinprick, vibration and proprioception in all extremities. Cerebellar examination is normal finger/nose and heel/shin. Gait and stance antalgic, unsteady, ambulates with walker.     NIHSS   NIHSS Score: 0  1a-Level of Consciousness 0  1b-What is Month/Age 0  1c-Open/Close Eyes&Hand 0  2 -Best Gaze 0  3 -Visual Fields 0  4 -Facial Palsy 0  5a-Motor-Left Arm 0  5b-Motor-Right Arm 0  6a-Motor-Left Leg 0  6b-Motor-Right Leg 0  7 -Limb Ataxia 0  8 -Sensory 0  9 -Best Language 0  10-Dysarthria 0  11-Extinction/Inattention 0    Lab Results   Component Value Date/Time    Cholesterol, total 165 07/04/2021 04:56 AM    HDL Cholesterol 52 07/04/2021 04:56 AM    LDL,Direct 141 (H) 03/14/2021 04:05 AM    LDL, calculated 71.2 07/04/2021 04:56 AM    VLDL, calculated 41.8 (H) 07/04/2021 04:56 AM    Triglyceride 209 (H) 07/04/2021 04:56 AM    CHOL/HDL Ratio 3.2 07/04/2021 04:56 AM        Lab Results   Component Value Date/Time    Hemoglobin A1c 5.2 07/04/2021 04:56 AM        CT Results (most recent):  EXAM: CT HEAD WITHOUT CONTRAST     INDICATION: patient with acute neuro changes.     COMPARISON: Brain MRI 3/13/2021      TECHNIQUE: Contiguous axial images were obtained from the skull base through the  vertex without IV contrast. Radiation dose reduction techniques were used for  this study. Our CT scanners use one or all of the following: Automated exposure  control, adjustment of the mA and/or kV according to patient size, iterative  reconstruction.     FINDINGS:   Normal appearance of the brain parenchyma without evidence of acute infarction,  hemorrhage, or mass lesion. No hydrocephalus or midline shift. No extra-axial  mass or hemorrhage.  The basal cisterns are patent.      The visualized portions of the orbits are normal. The mastoid air cells and  paranasal sinuses are patent.     The visualized vascular structures have an unremarkable noncontrast appearance.     The calvarium and soft tissues appear normal.     IMPRESSION  No acute intracranial abnormality evident by CT.       Results for orders placed or performed during the hospital encounter of 07/03/21   EKG, 12 LEAD, INITIAL   Result Value Ref Range    Ventricular Rate 56 BPM    Atrial Rate 56 BPM    P-R Interval 152 ms    QRS Duration 84 ms    Q-T Interval 450 ms    QTC Calculation (Bezet) 434 ms    Calculated P Axis -49 degrees    Calculated R Axis -36 degrees    Calculated T Axis 27 degrees    Diagnosis       !! AGE AND GENDER SPECIFIC ECG ANALYSIS !!  Unusual P axis, possible ectopic atrial bradycardia  Left axis deviation  Low voltage QRS  Cannot rule out Anterior infarct (cited on or before 29-MAR-2015)  Abnormal ECG  When compared with ECG of 13-MAR-2021 15:11,  Ectopic atrial rhythm has replaced Sinus rhythm          MRI Results (most recent): Most recent CTA:  Results from [place] encounter on 07/03/21    CTA CODE NEURO HEAD AND NECK W CONT    Narrative  HISTORY: 74years of age Female with dysarthria and right-sided sensory  deficit. [facility] EXAM: CTA CODE NEURO HEAD AND NECK W CONT. Radiation reduction dose techniques  were used for the study. Our CT scanner use one or all of the following-  Automated exposure control, adjustment of the mA and/or KV according the patient  size, iterative reconstruction. 3-D multiplanar reformations were performed at  the workstation. All carotid artery stenosis percentages are based upon NASCET  criteria if appropriate. Per the CT technologist, the study was analyzed by the  [address] algorithm. The radiologists have been asked by the hospital  administration to designate when CTA studies are sent to [address] ai even though this  algorithm is not used by the radiologist for exam interpretation. COMPARISON: CTA head and neck exam on 3/13/2021. Multiple prior CT head  examinations with most recent on 7/3/2021. CT perfusion exam on 7/3/2021.    3/13/2021 examination demonstrated atherosclerotic disease of the bilateral  carotid bulbs was less than 50% stenosis. There was also no intracranial  arterial vessel occlusion. FINDINGS:  VASCULAR FINDINGS:  Cervical CTA:  Visualized Aorta: There is a three-vessel branching pattern of the aortic arch. Trace atherosclerotic disease of the aortic arch. Right Subclavian Artery: Grossly patent. Left Subclavian Artery: Grossly Patent. Right Carotid Arteries:  Common Carotid Artery: Patent without significant stenosis. External Carotid  Artery: Grossly patent. Carotid Bulb and Proximal ICA: Mild atherosclerotic disease of the right carotid  bulb and ICA origin with less than 50% stenosis. Cervical Right ICA: Patent.     Vertebral Artery Dominance: Codominant. Cervical Right Vertebral Artery: Patent. Left Carotid Arteries:  Common Carotid Artery: Patent without significant stenosis. External Carotid  Artery: Grossly patent. Carotid Bulb and Proximal ICA: Mild atherosclerotic disease of the left carotid  bulb and ICA origin with less than 50% stenosis. Cervical Left ICA: Patent. Cervical Left Vertebral Artery: Patent. No significant stenosis or occlusion in the major cervical arterial vasculature. Cranial CTA:  Right Internal Carotid Artery: Patent. Left Internal Carotid Artery: Patent. Intracranial ICA Atherosclerotic Disease: There is no significant  atherosclerotic disease of the bilateral ICAs. Anterior Cerebral Arteries:  Right A1 Segment: Patent. Left A1: Hypoplastic but patent. Patent and common A2 segment. Bilateral A3 segments appear grossly patent. Right Middle Cerebral Arteries:  Right M1 segment is patent. Major proximal MCA branches beyond the bifurcation  are patent. Left Middle Cerebral Arteries:  Left M1 segment is patent. Major proximal MCA branches beyond the bifurcation  are patent. Right Vertebral Artery: Patent. Left Vertebral Artery: Patent. Basilar Artery: Patent. Right Posterior Cerebral Artery: Patent. Left Posterior Cerebral Artery: Patent. Intracranial Aneurysms: None visualized. .  Intracranial Proximal Vessel Occlusion: None. Major Dural Venous Sinuses: Grossly patent where adequately visualized. NONVASCULAR FINDINGS:  Head:  Brain Parenchyma: No evidence of enhancing masses or focal fluid collections. Orbital Structures: Bilateral lens replacements. Calvarial/Maxillofacial Soft Tissues: No evidence of significant acute  abnormality in the calvarial or maxillofacial soft tissues. Neck:  Thyroid gland:No dominant nodules. Cervical and Upper Mediastinal Adenopathy: No significant adenopathy. Upper Lungs: Without areas of prominent focal consolidation or masses.     OSSEOUS STRUCTURES:  Mastoid Air Cells: Under pneumatization of the left mastoid air cells. Right  mastoid air cells unremarkable. Paranasal Sinuses: No evidence of significant mucoperiosteal thickening. Cervical Spine: There are some mild multilevel degenerative changes of the  cervical spine. Impression  1. Negative CTA exam of the major cervical arterial vasculature for significant  stenosis or occlusion. 2. Negative CTA exam of the major intracranial arterial vasculature for  significant proximal vessel stenosis, occlusion, or aneurysms. Most recent MRA:      Most recent Echo:  No results found for this visit on 07/03/21. Assessment:     74year old female seen for probable acute stroke in left MCA territory. Code S presented with right facial droop, expressive aphasia, and gait imbalance. Seen by teleneurology. NIH 1. CT of head negative for acute infarct or hemorrhage. CTA of head/neck negative for LVO or high grade stenosis. CTP negative. She was not candidate for tPA or ERICA. TTE showed moderate LAD. Given she has endorsed palpations, recommend long term event monitoring to evaluate for pAF. MRI of brain pending. Plan:     · Start ASA 81 mg daily   · Start Plavix 75 mg po daily for 21 days.  Recommend long term event monitoring to evaluate for pAF  · Initiate high intensity statin   · Neurochecks Q4H  · MRI of brain- pending  · CTA of head and neck - done  · Bedside swallow test   · Labs: A1c, FLP, TSH, Cardiac enzymes, BMP, CBC  · Telemetry and echocardiogram with bubble study- done  · PT/OT/ST  · DVT prophylaxis   · BP management - allow permissive HTN to 220/120 x24 hours, treat with labetalol 10 mg IV or nicardipine gtt  · Smoking cessation if applicable   · Diabetes education if applicable   · Depression Screening prior to discharge      Signed By: JUS Ozuna     July 4, 2021

## 2021-07-04 NOTE — PROGRESS NOTES
07/03/21 1945   Dual Skin Pressure Injury Assessment   Dual Skin Pressure Injury Assessment WDL   Second Care Provider (Based on [address] [place] [address]) Shira Monae RN   Skin Integumentary   Skin Integumentary (WDL) WDL    Pressure  Injury Documentation No Pressure Injury Noted-Pressure Ulcer Prevention Initiated   Skin Color Appropriate for ethnicity   Skin Condition/Temp Warm;Dry   Skin Integrity Intact;Scars (comment)   Hair Growth Present   Nails WDL   Varicosities Absent   Wound Prevention and Protection Methods   Orientation of Wound Prevention Posterior   Location of Wound Prevention Sacrum/Coccyx   Dressing Present  No   Wound Offloading (Prevention Methods) Pillows

## 2021-07-04 NOTE — PROGRESS NOTES
07/04/21 1319   NIH Stroke Scale   Interval Other (comment)   Level of Conciousness (1a) 0   LOC Questions (1b) 0   LOC Commands (1c) 0   Best Gaze (2) 0   Visual (3) 0   Facial Palsy (4) 0   Motor Arm, Left (5a) 0   Motor Arm, Right (5b) 0   Motor Leg, Left (6a) 0   Motor Leg, Right (6b) 0   Limb Ataxia (7) 0   Sensory (8) (!) 1  (chronic)   Best Language (9) 0   Dysarthria (10) 0   Extinction and Inattention (11) 0   Total 1   DISCHARGE [facility]

## 2021-07-04 NOTE — PROGRESS NOTES
Patient was given a patient stroke education book. Patient educated on signs and symptoms of a stroke and importance of getting to the nearest ED if symptoms occur. Pt educated on the risk factors of stroke .      Medication side effect sheet provided to patient upon arrival.

## 2021-07-04 NOTE — DISCHARGE SUMMARY
Hospitalist Discharge Summary     Admit Date:  7/3/2021  3:47 PM   DC note date: 2021  Name:  Latonia Regan Masters   Age:  74 y.o.  :  1955   MRN:  [id number]   PCP:  Deloris Shearer MD  Treatment Team: Attending Provider: Nile Samuels MD; Consulting Provider: Alexandra Quinn MD; Occupational Therapist: Didier Christensen; Primary Nurse: Lupis Juan RN  Presenting Complaint: Numbness    Initial Admission Diagnosis: Right facial numbness [R20.0]     Problem List for this Hospitalization:  Hospital Problems as of 2021 Never Reviewed        Codes Class Noted - Resolved POA    * (Principal) TIA (transient ischemic attack) ICD-10-CM: G45.9  ICD-9-CM: 435.9  7/3/2021 - Present Yes        Anxiety ICD-10-CM: F41.9  ICD-9-CM: 300.00  3/13/2021 - Present Yes        HTN (hypertension) (Chronic) ICD-10-CM: I10  ICD-9-CM: 401.9  3/13/2021 - Present Yes                Admission HPI from 7/3/2021:    \"Patient is a 74 y.o. female who presented to the ED for cc right facial droop and slurred speech since 2pm. Hx of anxiety, GERD, HTN. NIH 1. Was dc for similar concerns 3/15/21. ECHO then showed no shunting. Tele neuro recommends 150mg Plavix now since she has already taken 150mg of her 's meds and start Plavix/ASA for 21 days. High dose statin  \"    Hospital Course:  Placed in observation for TIA. Started on ASA/plavix. Workup unremarkable, see full results below. MRI brain no acute infarct seen. Continue aspirin indefinitely. Cont plavix for 21 days. Neurology recommended 30 day event monitor so cardiology ordered this. Pt just has to show up at their office Tuesday. Dr Bri Mares Note:  Patient with TIA presentation , hospital medicine service has asked for outpatient ambulatory telemetry monitoring. Will place order for 30-day event monitor.   Patient should be instructed to go to our office this coming Tuesday, 2021 so that the monitor can be placed in follow-up in  Pt was given Depo in right hip.   weeks can be arranged after the monitor results return.            Orders Placed This Encounter    AMB POC EXTERNAL PT ACTIVTD ECG DWNLD W/R&I </30 DAYS       Scheduling Instructions:         EVENT MONITORING FOR 30 DAYS       Order Specific Question:   Reason for Exam:       Answer:   TIA           Disposition:   Activity: Activity as tolerated  Diet: ADULT DIET Regular; Low Fat/Low Chol/High Fiber/2 gm Na  Code Status: DNR    Follow Up Orders:  No orders of the defined types were placed in this encounter. Follow-up Information     Follow up With Specialties Details Why Contact Info    Bhargav Santos MD Internal Medicine, Internal Medicine Call As needed [address] [facility]  [address] [facility] Cardiology Go on 7/6/2021 go to office that day to get event monitor placed and arrange follow up 6 weeks after that 2 [facility] [address] [phone]          Time spent in patient discharge and coordination 35 minutes. Plan was discussed with pt. All questions answered. Patient was stable at time of discharge. Given instructions to call a physician or return if any concerns. Discharge summary and encounter summary was sent to PCP electronically via \"Comm Mgt\" link in [facility], if possible. Discharge Info:   Current Discharge Medication List      START taking these medications    Details   clopidogreL (PLAVIX) 75 mg tab Take 1 Tablet by mouth daily for 21 days. Qty: 21 Tablet, Refills: 0  Start date: 7/5/2021, End date: 7/26/2021      aspirin 81 mg chewable tablet Take 1 Tablet by mouth daily. Indications: stroke prevention  Qty: 90 Tablet, Refills: 1  Start date: 7/5/2021         CONTINUE these medications which have NOT CHANGED    Details   phentermine (ADIPEX-P) 37.5 mg tablet Take 37.5 mg by mouth daily (with breakfast).       gabapentin (NEURONTIN) 300 mg capsule Take 300 mg by mouth two (2) times a day. rOPINIRole (REQUIP) 1 mg tablet Take 1 mg by mouth nightly. omeprazole (PRILOSEC) 40 mg capsule Take 40 mg by mouth.      propranolol (INDERAL) 20 mg tablet Take 20 mg by mouth two (2) times a day.      ezetimibe (ZETIA) 10 mg tablet Take 10 mg by mouth daily. ramipril (ALTACE) 10 mg capsule Take 10 mg by mouth daily. FLUoxetine (PROZAC) 20 mg capsule Take 20 mg by mouth daily. atorvastatin (LIPITOR) 80 mg tablet Take 80 mg by mouth daily. lidocaine (LIDODERM) 5 % Apply patch to the affected area for 12 hours a day and remove for 12 hours a day. Qty: 4 Each, Refills: 0             Procedures done this admission:  * No surgery found *    Consults this admission:  None    Echocardiogram/EKG results:  Results for orders placed or performed during the hospital encounter of 07/03/21   EKG, 12 LEAD, INITIAL   Result Value Ref Range    Ventricular Rate 56 BPM    Atrial Rate 56 BPM    P-R Interval 152 ms    QRS Duration 84 ms    Q-T Interval 450 ms    QTC Calculation (Bezet) 434 ms    Calculated P Axis -49 degrees    Calculated R Axis -36 degrees    Calculated T Axis 27 degrees    Diagnosis       Sinus bradycardia  Left axis deviation  Cannot rule out Anterior infarct (cited on or before 29-MAR-2015)  Abnormal ECG  When compared with ECG of 13-MAR-2021 15:11,  No significant change was found  Confirmed by Hailee Quinn ([id number]) on 7/4/2021 12:05:21 PM         Diagnostic Imaging/Tests:   MRI BRAIN WO CONT    Result Date: 7/4/2021  BRAIN MRI: CLINICAL HISTORY:  Slurred speech and right facial droop with clinical suspicion of stroke. TECHNIQUE:  Sagittal T1 weighted, coronal FLAIR, and axial T1 and T2-weighted spin echo, FLAIR, gradient echo, and diffusion-weighted images were obtained. COMPARISON:  HEAD CT and CTA yesterday as well as MRI of March 13, 2021. FINDINGS:  No intracranial mass effect, hemorrhage, or evidence of acute/subacute ischemia is seen.   A few punctate T2 and FLAIR hyperintensities scattered in the white matter are nonspecific but most consistent with small vessel ischemic disease. The ventricles are normal in size and configuration accounting for the patient's age. Orbits and paranasal sinuses as well as mastoid air cells are clear as imaged. MILD SMALL VESSEL ISCHEMIC DISEASE WITH NO ACUTE INTRACRANIAL ABNORMALITY IDENTIFIED. CT PERF W CBF    Result Date: 7/3/2021  CT Perfusion Imaging INDICATION:  Right sided sensory deficit, dysarthria-Sent to [facility] CT perfusion imaging of the brain was performed after the administration of intravenous contrast.  Perfusion maps and perfusion analysis output were generated using the [employer] perfusion processing software algorithm. Radiation dose reduction techniques were used for this study: All CT scans performed at this facility use one or all of the following: Automated exposure control, adjustment of the mA and/or kVp according to patient's size, iterative reconstruction. COMPARISON: None. Correlation is made to the CTA and CT brain performed earlier on the same day. THE MELT Output Values: CBF < 30% volume:  0 ml   (core infarction volume greater than 50 cc associated with poor outcomes) Tmax > 6 seconds: 0 ml Tmax/CBF Mismatch Volume: 0 ml Tmax/CBF Mismatch Ratio: N/A Hypoperfusion Intensity Ratio (Tmax > 10 seconds/Tmax > 6 seconds): 0   (values greater than 0.5 associated with poor outcome) Tmax > 10 seconds Volume: 0 ml   (volume greater than 100 mL is associated with poor outcome)     No CT evidence of acute perfusion abnormality. Please note that the determination of patient treatment is not based solely upon imaging factors or calculation values. Management of ischemia is at the discretion of the primary physician and is based upon a combination of clinical and imaging data, along other factors.      CTA CODE NEURO HEAD AND NECK W CONT    Result Date: 7/3/2021  HISTORY: 74years of age Female with dysarthria and right-sided sensory deficit. Larry Lorenzo EXAM: CTA CODE NEURO HEAD AND NECK W CONT. Radiation reduction dose techniques were used for the study. Our CT scanner use one or all of the following- Automated exposure control, adjustment of the mA and/or KV according the patient size, iterative reconstruction. 3-D multiplanar reformations were performed at the workstation. All carotid artery stenosis percentages are based upon NASCET criteria if appropriate. Per the CT technologist, the study was analyzed by the [address] ai algorithm. The radiologists have been asked by the hospital administration to designate when CTA studies are sent to [address] ai even though this algorithm is not used by the radiologist for exam interpretation. COMPARISON: CTA head and neck exam on 3/13/2021. Multiple prior CT head examinations with most recent on 7/3/2021. CT perfusion exam on 7/3/2021. 3/13/2021 examination demonstrated atherosclerotic disease of the bilateral carotid bulbs was less than 50% stenosis. There was also no intracranial arterial vessel occlusion. FINDINGS: VASCULAR FINDINGS: Cervical CTA: Visualized Aorta: There is a three-vessel branching pattern of the aortic arch. Trace atherosclerotic disease of the aortic arch. Right Subclavian Artery: Grossly patent. Left Subclavian Artery: Grossly Patent. Right Carotid Arteries: Common Carotid Artery: Patent without significant stenosis. External Carotid Artery: Grossly patent. Carotid Bulb and Proximal ICA: Mild atherosclerotic disease of the right carotid bulb and ICA origin with less than 50% stenosis. Cervical Right ICA: Patent. Vertebral Artery Dominance: Codominant. Cervical Right Vertebral Artery: Patent. Left Carotid Arteries: Common Carotid Artery: Patent without significant stenosis. External Carotid Artery: Grossly patent. Carotid Bulb and Proximal ICA: Mild atherosclerotic disease of the left carotid bulb and ICA origin with less than 50% stenosis. Cervical Left ICA: Patent.  Cervical Left Vertebral Artery: Patent. No significant stenosis or occlusion in the major cervical arterial vasculature. Cranial CTA: Right Internal Carotid Artery: Patent. Left Internal Carotid Artery: Patent. Intracranial ICA Atherosclerotic Disease: There is no significant atherosclerotic disease of the bilateral ICAs. Anterior Cerebral Arteries: Right A1 Segment: Patent. Left A1: Hypoplastic but patent. Patent and common A2 segment. Bilateral A3 segments appear grossly patent. Right Middle Cerebral Arteries: Right M1 segment is patent. Major proximal MCA branches beyond the bifurcation are patent. Left Middle Cerebral Arteries: Left M1 segment is patent. Major proximal MCA branches beyond the bifurcation are patent. Right Vertebral Artery: Patent. Left Vertebral Artery: Patent. Basilar Artery: Patent. Right Posterior Cerebral Artery: Patent. Left Posterior Cerebral Artery: Patent. Intracranial Aneurysms: None visualized. . Intracranial Proximal Vessel Occlusion: None. Major Dural Venous Sinuses: Grossly patent where adequately visualized. NONVASCULAR FINDINGS: Head: Brain Parenchyma: No evidence of enhancing masses or focal fluid collections. Orbital Structures: Bilateral lens replacements. Calvarial/Maxillofacial Soft Tissues: No evidence of significant acute abnormality in the calvarial or maxillofacial soft tissues. Neck: Thyroid gland:No dominant nodules. Cervical and Upper Mediastinal Adenopathy: No significant adenopathy. Upper Lungs: Without areas of prominent focal consolidation or masses. OSSEOUS STRUCTURES: Mastoid Air Cells: Under pneumatization of the left mastoid air cells. Right mastoid air cells unremarkable. Paranasal Sinuses: No evidence of significant mucoperiosteal thickening. Cervical Spine: There are some mild multilevel degenerative changes of the cervical spine. 1. Negative CTA exam of the major cervical arterial vasculature for significant stenosis or occlusion.  2. Negative CTA exam of the major intracranial arterial vasculature for significant proximal vessel stenosis, occlusion, or aneurysms. CT CODE NEURO HEAD WO CONTRAST    Result Date: 7/3/2021  EXAM: CT HEAD WITHOUT CONTRAST INDICATION: patient with acute neuro changes. COMPARISON: Brain MRI 3/13/2021 TECHNIQUE: Contiguous axial images were obtained from the skull base through the vertex without IV contrast. Radiation dose reduction techniques were used for this study. Our CT scanners use one or all of the following: Automated exposure control, adjustment of the mA and/or kV according to patient size, iterative reconstruction. FINDINGS: Normal appearance of the brain parenchyma without evidence of acute infarction, hemorrhage, or mass lesion. No hydrocephalus or midline shift. No extra-axial mass or hemorrhage. The basal cisterns are patent. The visualized portions of the orbits are normal. The mastoid air cells and paranasal sinuses are patent. The visualized vascular structures have an unremarkable noncontrast appearance. The calvarium and soft tissues appear normal.     No acute intracranial abnormality evident by CT.        All Micro Results     None          SARS-CoV-2 Lab Results  \"Novel Coronavirus\" Test: No results found for: COV2NT   \"Emergent Disease\" Test: No results found for: EDPR  \"SARS-COV-2\" Test: No results found for: XGCOVT  Rapid Test: No results found for: COVR         Labs: Results:       BMP, Mg, Phos Recent Labs     07/04/21  0456 07/03/21  1550   * 144   K 3.3* 3.5   * 110*   CO2 27 28   AGAP 7 6*   BUN 16 20   CREA 0.67 0.76   CA 8.3 8.6   GLU 96 87      CBC Recent Labs     07/04/21  0456 07/03/21  1550   WBC 9.5 9.0   RBC 3.32* 3.39*   HGB 10.6* 11.0*   HCT 31.4* 32.9*    193   GRANS  --  43   LYMPH  --  48*   EOS  --  2   MONOS  --  6   BASOS  --  1   IG  --  0   ANEU  --  3.9   ABL  --  4.3   HARPAL  --  0.2   ABM  --  0.5   ABB  --  0.1   AIG  --  0.0      LFT Recent Labs     07/03/21  1550   ALT 25      TP 6.7   ALB 3.3   GLOB 3.4   AGRAT 1.0*      Cardiac Testing Lab Results   Component Value Date/Time    BNP 9 03/29/2015 06:40 PM      Coagulation Tests Lab Results   Component Value Date/Time    Prothrombin time 13.4 07/03/2021 03:50 PM    Prothrombin time 12.9 03/13/2021 02:51 PM    INR 1.0 07/03/2021 03:50 PM    INR 0.9 03/13/2021 02:51 PM    INR (POC) 1.1 07/03/2021 03:47 PM      A1c Lab Results   Component Value Date/Time    Hemoglobin A1c 5.2 07/04/2021 04:56 AM    Hemoglobin A1c 5.2 03/14/2021 04:05 AM      Lipid Panel Lab Results   Component Value Date/Time    Cholesterol, total 165 07/04/2021 04:56 AM    HDL Cholesterol 52 07/04/2021 04:56 AM    LDL,Direct 141 (H) 03/14/2021 04:05 AM    LDL, calculated 71.2 07/04/2021 04:56 AM    VLDL, calculated 41.8 (H) 07/04/2021 04:56 AM    Triglyceride 209 (H) 07/04/2021 04:56 AM    CHOL/HDL Ratio 3.2 07/04/2021 04:56 AM      Thyroid Panel Lab Results   Component Value Date/Time    TSH 0.887 03/29/2015 06:40 PM        Most Recent UA Lab Results   Component Value Date/Time    Color YELLOW 03/13/2021 04:19 PM    Appearance CLEAR 03/13/2021 04:19 PM    Specific gravity 1.030 (H) 03/13/2021 04:19 PM    pH (UA) 5.5 03/13/2021 04:19 PM    Protein Negative 03/13/2021 04:19 PM    Glucose Negative 03/13/2021 04:19 PM    Ketone Negative 03/13/2021 04:19 PM    Bilirubin Negative 03/13/2021 04:19 PM    Blood Negative 03/13/2021 04:19 PM    Urobilinogen 0.2 03/13/2021 04:19 PM    Nitrites Negative 03/13/2021 04:19 PM    Leukocyte Esterase Negative 03/13/2021 04:19 PM    WBC 5-10 03/29/2015 07:10 PM    RBC 0-3 03/29/2015 07:10 PM    Epithelial cells 3-5 03/29/2015 07:10 PM    Bacteria 0 03/29/2015 07:10 PM    Casts 0-3 03/29/2015 07:10 PM          All Labs from Last 24 Hrs:  Recent Results (from the past 24 hour(s))   GLUCOSE, POC    Collection Time: 07/03/21  3:45 PM   Result Value Ref Range    Glucose (POC) 96 65 - 100 mg/dL    Performed by [facility]    POC PT/INR    Collection Time: 07/03/21  3:47 PM   Result Value Ref Range    Prothrombin time (POC) 13.0 (H) 9.6 - 11.6 SECS    INR (POC) 1.1 0.9 - 1.2     CBC WITH AUTOMATED DIFF    Collection Time: 07/03/21  3:50 PM   Result Value Ref Range    WBC 9.0 4.3 - 11.1 K/uL    RBC 3.39 (L) 4.05 - 5.2 M/uL    HGB 11.0 (L) 11.7 - 15.4 g/dL    HCT 32.9 (L) 35.8 - 46.3 %    MCV 97.1 79.6 - 97.8 FL    MCH 32.4 26.1 - 32.9 PG    MCHC 33.4 31.4 - 35.0 g/dL    RDW 13.6 11.9 - 14.6 %    PLATELET 888 863 - 370 K/uL    MPV 10.3 9.4 - 12.3 FL    ABSOLUTE NRBC 0.00 0.0 - 0.2 K/uL    DF AUTOMATED      NEUTROPHILS 43 43 - 78 %    LYMPHOCYTES 48 (H) 13 - 44 %    MONOCYTES 6 4.0 - 12.0 %    EOSINOPHILS 2 0.5 - 7.8 %    BASOPHILS 1 0.0 - 2.0 %    IMMATURE GRANULOCYTES 0 0.0 - 5.0 %    ABS. NEUTROPHILS 3.9 1.7 - 8.2 K/UL    ABS. LYMPHOCYTES 4.3 0.5 - 4.6 K/UL    ABS. MONOCYTES 0.5 0.1 - 1.3 K/UL    ABS. EOSINOPHILS 0.2 0.0 - 0.8 K/UL    ABS. BASOPHILS 0.1 0.0 - 0.2 K/UL    ABS. IMM. GRANS. 0.0 0.0 - 0.5 K/UL   PROTHROMBIN TIME + INR    Collection Time: 07/03/21  3:50 PM   Result Value Ref Range    Prothrombin time 13.4 12.5 - 14.7 sec    INR 1.0     METABOLIC PANEL, COMPREHENSIVE    Collection Time: 07/03/21  3:50 PM   Result Value Ref Range    Sodium 144 136 - 145 mmol/L    Potassium 3.5 3.5 - 5.1 mmol/L    Chloride 110 (H) 98 - 107 mmol/L    CO2 28 21 - 32 mmol/L    Anion gap 6 (L) 7 - 16 mmol/L    Glucose 87 65 - 100 mg/dL    BUN 20 8 - 23 MG/DL    Creatinine 0.76 0.6 - 1.0 MG/DL    GFR est AA >60 >60 ml/min/1.73m2    GFR est non-AA >60 >60 ml/min/1.73m2    Calcium 8.6 8.3 - 10.4 MG/DL    Bilirubin, total 0.3 0.2 - 1.1 MG/DL    ALT (SGPT) 25 12 - 65 U/L    AST (SGOT) 27 15 - 37 U/L    Alk.  phosphatase 107 50 - 136 U/L    Protein, total 6.7 6.3 - 8.2 g/dL    Albumin 3.3 3.2 - 4.6 g/dL    Globulin 3.4 2.3 - 3.5 g/dL    A-G Ratio 1.0 (L) 1.2 - 3.5     TROPONIN-HIGH SENSITIVITY    Collection Time: 07/03/21  3:50 PM   Result Value Ref Range Troponin-High Sensitivity 14.7 (H) 0 - 14 pg/mL   EKG, 12 LEAD, INITIAL    Collection Time: 07/03/21  3:59 PM   Result Value Ref Range    Ventricular Rate 56 BPM    Atrial Rate 56 BPM    P-R Interval 152 ms    QRS Duration 84 ms    Q-T Interval 450 ms    QTC Calculation (Bezet) 434 ms    Calculated P Axis -49 degrees    Calculated R Axis -36 degrees    Calculated T Axis 27 degrees    Diagnosis       Sinus bradycardia  Left axis deviation  Cannot rule out Anterior infarct (cited on or before 29-MAR-2015)  Abnormal ECG  When compared with ECG of 13-MAR-2021 15:11,  No significant change was found  Confirmed by Ramirez Alarcon ([id number]) on 7/4/2021 12:05:21 PM     TROPONIN-HIGH SENSITIVITY    Collection Time: 07/03/21  8:03 PM   Result Value Ref Range    Troponin-High Sensitivity 14.3 (H) 0 - 14 pg/mL   CBC W/O DIFF    Collection Time: 07/04/21  4:56 AM   Result Value Ref Range    WBC 9.5 4.3 - 11.1 K/uL    RBC 3.32 (L) 4.05 - 5.2 M/uL    HGB 10.6 (L) 11.7 - 15.4 g/dL    HCT 31.4 (L) 35.8 - 46.3 %    MCV 94.6 79.6 - 97.8 FL    MCH 31.9 26.1 - 32.9 PG    MCHC 33.8 31.4 - 35.0 g/dL    RDW 13.4 11.9 - 14.6 %    PLATELET 979 832 - 708 K/uL    MPV 10.6 9.4 - 12.3 FL    ABSOLUTE NRBC 0.00 0.0 - 0.2 K/uL   LIPID PANEL    Collection Time: 07/04/21  4:56 AM   Result Value Ref Range    Cholesterol, total 165 <200 MG/DL    Triglyceride 209 (H) 35 - 150 MG/DL    HDL Cholesterol 52 40 - 60 MG/DL    LDL, calculated 71.2 <100 MG/DL    VLDL, calculated 41.8 (H) 6.0 - 23.0 MG/DL    CHOL/HDL Ratio 3.2     HEMOGLOBIN A1C WITH EAG    Collection Time: 07/04/21  4:56 AM   Result Value Ref Range    Hemoglobin A1c 5.2 4.2 - 6.3 %    Est. average glucose 638 mg/dL   METABOLIC PANEL, BASIC    Collection Time: 07/04/21  4:56 AM   Result Value Ref Range    Sodium 146 (H) 136 - 145 mmol/L    Potassium 3.3 (L) 3.5 - 5.1 mmol/L    Chloride 112 (H) 98 - 107 mmol/L    CO2 27 21 - 32 mmol/L    Anion gap 7 7 - 16 mmol/L    Glucose 96 65 - 100 mg/dL    BUN 16 8 - 23 MG/DL    Creatinine 0.67 0.6 - 1.0 MG/DL    GFR est AA >60 >60 ml/min/1.73m2    GFR est non-AA >60 >60 ml/min/1.73m2    Calcium 8.3 8.3 - 10.4 MG/DL       Discharge Exam:  Patient Vitals for the past 24 hrs:   Temp Pulse Resp BP SpO2   07/04/21 1200 98.4 °F (36.9 °C) 62 16 (!) 125/57 100 %   07/04/21 0926 -- 72 -- -- --   07/04/21 0800 97.6 °F (36.4 °C) 66 16 (!) 147/71 97 %   07/04/21 0400 98 °F (36.7 °C) 64 16 127/62 96 %   07/04/21 0000 98.4 °F (36.9 °C) 63 18 (!) 144/65 93 %   07/03/21 2000 98.8 °F (37.1 °C) 60 16 (!) 145/63 95 %   07/03/21 1929 -- (!) 59 -- (!) 138/97 98 %   07/03/21 1601 -- (!) 55 18 127/61 98 %   07/03/21 1546 98.3 °F (36.8 °C) 98 16 (!) 150/77 97 %     Oxygen Therapy  O2 Sat (%): 100 % (07/04/21 1200)  Pulse via Oximetry: 58 beats per minute (07/03/21 1929)  O2 Device: None (Room air) (07/03/21 1546)    Estimated body mass index is 29.95 kg/m² as calculated from the following:    Height as of this encounter: 5' 5\" (1.651 m). Weight as of this encounter: 81.6 kg (180 lb). Intake/Output Summary (Last 24 hours) at 7/4/2021 1333  Last data filed at 7/4/2021 0800  Gross per 24 hour   Intake 380 ml   Output --   Net 380 ml       *Note that automatically entered I/Os may not be accurate; dependent on patient compliance with collection and accurate  by assistants. General:    Well nourished. No overt distress  Eyes:   Normal sclerae. Extraocular movements intact. ENT:  Normocephalic, atraumatic. Moist mucous membranes  CV:   Regular rate and rhythm. No m/r/g. No edema. Lungs:  CTAB. No wheezing, rhonchi, or rales. Unlabored  Abdomen: Soft, nontender, nondistended. Extremities: Warm and dry. No cyanosis or clubbing  Neurologic: CN II-XII grossly intact. No gross focal deficits. Alert. Skin:     No rashes. No jaundice. Psych:  Normal mood and affect.     Current Med List in Hospital:   Current Facility-Administered Medications   Medication Dose Route Frequency    propranoloL (INDERAL) tablet 20 mg  20 mg Oral BID    LORazepam (ATIVAN) injection 1 mg  1 mg IntraVENous Q4H PRN    FLUoxetine (PROzac) capsule 20 mg  20 mg Oral DAILY    pantoprazole (PROTONIX) tablet 40 mg  40 mg Oral DAILY    [Held by provider] lisinopriL (PRINIVIL, ZESTRIL) tablet 40 mg  40 mg Oral DAILY    atorvastatin (LIPITOR) tablet 80 mg  80 mg Oral DAILY    ezetimibe (ZETIA) tablet 10 mg  10 mg Oral DAILY    sodium chloride (NS) flush 5-40 mL  5-40 mL IntraVENous Q8H    sodium chloride (NS) flush 5-40 mL  5-40 mL IntraVENous PRN    acetaminophen (TYLENOL) tablet 650 mg  650 mg Oral Q6H PRN    Or    acetaminophen (TYLENOL) suppository 650 mg  650 mg Rectal Q6H PRN    polyethylene glycol (MIRALAX) packet 17 g  17 g Oral DAILY PRN    ondansetron (ZOFRAN ODT) tablet 4 mg  4 mg Oral Q8H PRN    Or    ondansetron (ZOFRAN) injection 4 mg  4 mg IntraVENous Q6H PRN    enoxaparin (LOVENOX) injection 40 mg  40 mg SubCUTAneous DAILY    tuberculin injection 5 Units  5 Units IntraDERMal ONCE    aspirin chewable tablet 81 mg  81 mg Oral DAILY    labetaloL (NORMODYNE;TRANDATE) injection 5 mg  5 mg IntraVENous Q10MIN PRN    bisacodyL (DULCOLAX) suppository 10 mg  10 mg Rectal DAILY PRN    famotidine (PEPCID) tablet 20 mg  20 mg Oral BID PRN    clopidogreL (PLAVIX) tablet 75 mg  75 mg Oral DAILY    lidocaine 4 % patch 1 Patch  1 Patch TransDERmal Q24H       Allergies   Allergen Reactions    Ambien [Zolpidem] Other (comments)     hallucinations     Immunization History   Administered Date(s) Administered    TB Skin Test (PPD) Intradermal 07/03/2021       Signed:  Raul Edward MD

## 2021-07-04 NOTE — PROGRESS NOTES
Problem: Falls - Risk of  Goal: *Absence of Falls  Description: Document Yasmine Chauhan Fall Risk and appropriate interventions in the flowsheet.   7/4/2021 1334 by Panfilo Baugh RN  Outcome: Resolved/Met  Note: Fall Risk Interventions:  Mobility Interventions: Bed/chair exit alarm, Patient to call before getting OOB         Medication Interventions: Patient to call before getting OOB, Teach patient to arise slowly, Bed/chair exit alarm    Elimination Interventions: Bed/chair exit alarm, Call light in reach, Patient to call for help with toileting needs    History of Falls Interventions: Bed/chair exit alarm, Door open when patient unattended      7/4/2021 0842 by Lupis José RN  Outcome: Progressing Towards Goal  Note: Fall Risk Interventions:  Mobility Interventions: Patient to call before getting OOB         Medication Interventions: Teach patient to arise slowly, Patient to call before getting OOB         History of Falls Interventions: Evaluate medications/consider consulting pharmacy         Problem: Patient Education: Go to Patient Education Activity  Goal: Patient/Family Education  7/4/2021 1334 by Gwen Bird RN  Outcome: Resolved/Met  7/4/2021 0842 by Panfilo Baugh RN  Outcome: Progressing Towards Goal     Problem: Patient Education: Go to Patient Education Activity  Goal: Patient/Family Education  7/4/2021 1334 by Gwen Bird RN  Outcome: Resolved/Met  7/4/2021 0842 by Panfilo Baugh RN  Outcome: Progressing Towards Goal     Problem: TIA/CVA Stroke: 0-24 hours  Goal: Off Pathway (Use only if patient is Off Pathway)  7/4/2021 1334 by Gwen Bird RN  Outcome: Resolved/Met  7/4/2021 0842 by Panfilo Baugh RN  Outcome: Progressing Towards Goal  Goal: Activity/Safety  7/4/2021 1334 by Panfilo Baugh RN  Outcome: Resolved/Met  7/4/2021 0842 by Panfilo Baugh RN  Outcome: Progressing Towards Goal  Goal: Consults, if ordered  7/4/2021 1334 by Gwen Bird RN  Outcome: Resolved/Met  7/4/2021 0842 by Karmen Schilder, Catie, RN  Outcome: Progressing Towards Goal  Goal: Diagnostic Test/Procedures  7/4/2021 1334 by Barbie Vizcaino RN  Outcome: Resolved/Met  7/4/2021 0842 by Hira Baugh RN  Outcome: Progressing Towards Goal  Goal: Nutrition/Diet  7/4/2021 1334 by Barbie Vizcaino RN  Outcome: Resolved/Met  7/4/2021 0842 by Hira Baugh RN  Outcome: Progressing Towards Goal  Goal: Discharge Planning  7/4/2021 1334 by Barbie Vizcaino RN  Outcome: Resolved/Met  7/4/2021 0842 by Hira Baugh RN  Outcome: Progressing Towards Goal  Goal: Medications  7/4/2021 1334 by Barbie Vizcaino, RN  Outcome: Resolved/Met  7/4/2021 0842 by Hira Baugh RN  Outcome: Progressing Towards Goal  Goal: Respiratory  7/4/2021 1334 by Barbie Vizcaino, RN  Outcome: Resolved/Met  7/4/2021 0842 by Hira Baugh RN  Outcome: Progressing Towards Goal  Goal: Treatments/Interventions/Procedures  7/4/2021 1334 by Barbie Vizcaino RN  Outcome: Resolved/Met  7/4/2021 0842 by Hira Baugh RN  Outcome: Progressing Towards Goal  Goal: Minimize risk of bleeding post-thrombolytic infusion  7/4/2021 1334 by Barbie Vizcaino RN  Outcome: Resolved/Met  7/4/2021 0842 by Hira Baugh RN  Outcome: Progressing Towards Goal  Goal: Monitor for complications post-thrombolytic infusion  7/4/2021 1334 by Barbie Vizcaino, RN  Outcome: Resolved/Met  7/4/2021 0842 by Hira Baugh RN  Outcome: Progressing Towards Goal  Goal: Psychosocial  7/4/2021 1334 by Barbie Vizcaino, RN  Outcome: Resolved/Met  7/4/2021 0842 by Hira Baugh RN  Outcome: Progressing Towards Goal  Goal: *Hemodynamically stable  7/4/2021 1334 by Barbie Vizcaino, RN  Outcome: Resolved/Met  7/4/2021 0842 by Hira Baugh RN  Outcome: Progressing Towards Goal  Goal: *Neurologically stable  Description: Absence of additional neurological deficits    7/4/2021 1334 by Barbie Vizcaino RN  Outcome: Resolved/Met  7/4/2021 0842 by Hira Baugh RN  Outcome: Progressing Towards Goal  Goal: *Verbalizes anxiety and depression are reduced or absent  7/4/2021 1334 by Abena Baugh RN  Outcome: Resolved/Met  7/4/2021 0842 by Abena Baugh RN  Outcome: Progressing Towards Goal  Goal: *Absence of Signs of Aspiration on Current Diet  7/4/2021 1334 by Bobby Gallardo RN  Outcome: Resolved/Met  7/4/2021 0842 by Abena Baugh RN  Outcome: Progressing Towards Goal  Goal: *Absence of deep venous thrombosis signs and symptoms(Stroke Metric)  7/4/2021 1334 by Bobby Gallardo RN  Outcome: Resolved/Met  7/4/2021 0842 by Abena Baugh RN  Outcome: Progressing Towards Goal  Goal: *Ability to perform ADLs and demonstrates progressive mobility and function  7/4/2021 1334 by Bobby Gallardo RN  Outcome: Resolved/Met  7/4/2021 0842 by Abena Baugh RN  Outcome: Progressing Towards Goal  Goal: *Stroke education started(Stroke Metric)  7/4/2021 1334 by Bobby Gallardo RN  Outcome: Resolved/Met  7/4/2021 0842 by Abena Baugh RN  Outcome: Progressing Towards Goal  Goal: *Dysphagia screen performed(Stroke Metric)  7/4/2021 1334 by Bobby Gallardo RN  Outcome: Resolved/Met  7/4/2021 0842 by Abena Baugh RN  Outcome: Progressing Towards Goal  Goal: *Rehab consulted(Stroke Metric)  7/4/2021 1334 by Bobby Gallardo RN  Outcome: Resolved/Met  7/4/2021 0842 by Abena Baugh RN  Outcome: Progressing Towards Goal     Problem: TIA/CVA Stroke: Day 2 Until Discharge  Goal: Off Pathway (Use only if patient is Off Pathway)  7/4/2021 1334 by Bobby Gallardo RN  Outcome: Resolved/Met  7/4/2021 0842 by Abena Baugh RN  Outcome: Progressing Towards Goal  Goal: Activity/Safety  7/4/2021 1334 by Bobby Gallardo RN  Outcome: Resolved/Met  7/4/2021 0842 by Abena Baugh RN  Outcome: Progressing Towards Goal  Goal: Diagnostic Test/Procedures  7/4/2021 1334 by Lequita Ee, RN  Outcome: Resolved/Met  7/4/2021 0842 by Abena Baugh RN  Outcome: Progressing Towards Goal  Goal: Nutrition/Diet  7/4/2021 1334 by Bobby Gallardo RN  Outcome: Resolved/Met  7/4/2021 0842 by Abena Green Lupis, TRICIA  Outcome: Progressing Towards Goal  Goal: Discharge Planning  7/4/2021 1334 by Barbie Vizcaino RN  Outcome: Resolved/Met  7/4/2021 0842 by Hira Baugh RN  Outcome: Progressing Towards Goal  Goal: Medications  7/4/2021 1334 by Barbie Vizcaino RN  Outcome: Resolved/Met  7/4/2021 0842 by Hira Baugh RN  Outcome: Progressing Towards Goal  Goal: Respiratory  7/4/2021 1334 by Barbie Vizcaino, RN  Outcome: Resolved/Met  7/4/2021 0842 by Hira Baugh RN  Outcome: Progressing Towards Goal  Goal: Treatments/Interventions/Procedures  7/4/2021 1334 by Barbie Vizcaino RN  Outcome: Resolved/Met  7/4/2021 0842 by Hira Baugh RN  Outcome: Progressing Towards Goal  Goal: Psychosocial  7/4/2021 1334 by Barbie Vizcaino RN  Outcome: Resolved/Met  7/4/2021 0842 by Hira Baugh RN  Outcome: Progressing Towards Goal  Goal: *Verbalizes anxiety and depression are reduced or absent  7/4/2021 1334 by Barbie Vizcaino, RN  Outcome: Resolved/Met  7/4/2021 0842 by Hira Baugh RN  Outcome: Progressing Towards Goal  Goal: *Absence of aspiration  7/4/2021 1334 by Barbie Vizcaino RN  Outcome: Resolved/Met  7/4/2021 0842 by Hira Baugh RN  Outcome: Progressing Towards Goal  Goal: *Absence of deep venous thrombosis signs and symptoms(Stroke Metric)  7/4/2021 1334 by Barbie Vizcaino, RN  Outcome: Resolved/Met  7/4/2021 0842 by Hira Baugh RN  Outcome: Progressing Towards Goal  Goal: *Optimal pain control at patient's stated goal  7/4/2021 1334 by Barbie Vizcaino, RN  Outcome: Resolved/Met  7/4/2021 0842 by Hira Baugh RN  Outcome: Progressing Towards Goal  Goal: *Tolerating diet  7/4/2021 1334 by Barbie Vizcaino, RN  Outcome: Resolved/Met  7/4/2021 0842 by Hira Baugh RN  Outcome: Progressing Towards Goal  Goal: *Ability to perform ADLs and demonstrates progressive mobility and function  7/4/2021 1334 by Barbie Vizcaino, RN  Outcome: Resolved/Met  7/4/2021 0842 by Hira Baugh RN  Outcome: Progressing Towards Goal  Goal: *Stroke education continued(Stroke Metric)  7/4/2021 1334 by Lorne Guthrie RN  Outcome: Resolved/Met  7/4/2021 0842 by Brigitte Baugh RN  Outcome: Progressing Towards Goal     Problem: Ischemic Stroke: Discharge Outcomes  Goal: *Verbalizes anxiety and depression are reduced or absent  7/4/2021 1334 by Lorne Guthrie RN  Outcome: Resolved/Met  7/4/2021 0842 by Brigitte Baugh RN  Outcome: Progressing Towards Goal  Goal: *Verbalize understanding of risk factor modification(Stroke Metric)  7/4/2021 1334 by Lorne Guthrie RN  Outcome: Resolved/Met  7/4/2021 0842 by Brigitte Baugh RN  Outcome: Progressing Towards Goal  Goal: *Hemodynamically stable  7/4/2021 1334 by Lorne Guthrie RN  Outcome: Resolved/Met  7/4/2021 0842 by Brigitte Baugh RN  Outcome: Progressing Towards Goal  Goal: *Absence of aspiration pneumonia  7/4/2021 1334 by Lorne Guthrie RN  Outcome: Resolved/Met  7/4/2021 0842 by Brigitte Baugh RN  Outcome: Progressing Towards Goal  Goal: *Aware of needed dietary changes  7/4/2021 1334 by Lorne Guthrie RN  Outcome: Resolved/Met  7/4/2021 0842 by Brigitte Baugh RN  Outcome: Progressing Towards Goal  Goal: *Verbalize understanding of prescribed medications including anti-coagulants, anti-lipid, and/or anti-platelets(Stroke Metric)  7/4/2021 1334 by Lorne Guthrie RN  Outcome: Resolved/Met  7/4/2021 0842 by Brigitte Baugh RN  Outcome: Progressing Towards Goal  Goal: *Tolerating diet  7/4/2021 1334 by Lorne Guthrie RN  Outcome: Resolved/Met  7/4/2021 0842 by Brigitte Baugh RN  Outcome: Progressing Towards Goal  Goal: *Aware of follow-up diagnostics related to anticoagulants  7/4/2021 1334 by Lorne Guthrie RN  Outcome: Resolved/Met  7/4/2021 0842 by Brigitte Baugh RN  Outcome: Progressing Towards Goal  Goal: *Ability to perform ADLs and demonstrates progressive mobility and function  7/4/2021 1334 by Lorne Guthrie, RN  Outcome: Resolved/Met  7/4/2021 0842 by Brigitte Baugh, RN  Outcome: Progressing Towards Goal  Goal: *Absence of DVT(Stroke Metric)  7/4/2021 1334 by Ki Ceballos RN  Outcome: Resolved/Met  7/4/2021 0842 by Miki Baugh RN  Outcome: Progressing Towards Goal  Goal: *Absence of aspiration  7/4/2021 1334 by Ki Ceballos RN  Outcome: Resolved/Met  7/4/2021 0842 by Miki Baugh RN  Outcome: Progressing Towards Goal  Goal: *Optimal pain control at patient's stated goal  7/4/2021 1334 by Ki Ceballos RN  Outcome: Resolved/Met  7/4/2021 0842 by Miki Baugh RN  Outcome: Progressing Towards Goal  Goal: *Home safety concerns addressed  7/4/2021 1334 by Ki Ceballos RN  Outcome: Resolved/Met  7/4/2021 0842 by Miki Baugh RN  Outcome: Progressing Towards Goal  Goal: *Describes available resources and support systems  7/4/2021 1334 by Ki Ceballos RN  Outcome: Resolved/Met  7/4/2021 0842 by Miki Baugh RN  Outcome: Progressing Towards Goal  Goal: *Verbalizes understanding of activation of EMS(911) for stroke symptoms(Stroke Metric)  7/4/2021 1334 by Ki Ceballos RN  Outcome: Resolved/Met  7/4/2021 0842 by Miki Baugh RN  Outcome: Progressing Towards Goal  Goal: *Understands and describes signs and symptoms to report to providers(Stroke Metric)  7/4/2021 1334 by Ki Ceballos RN  Outcome: Resolved/Met  7/4/2021 0842 by Miki Baugh RN  Outcome: Progressing Towards Goal  Goal: *Neurolgocially stable (absence of additional neurological deficits)  7/4/2021 1334 by Ki Ceballos RN  Outcome: Resolved/Met  7/4/2021 0842 by Miki Baugh RN  Outcome: Progressing Towards Goal  Goal: Alveta Hock importance of follow-up with primary care physician(Stroke Metric)  7/4/2021 1334 by Ki Ceballos RN  Outcome: Resolved/Met  7/4/2021 0842 by Miki Baugh RN  Outcome: Progressing Towards Goal  Goal: *Smoking cessation discussed,if applicable(Stroke Metric)  7/4/2021 1334 by Ki Ceballos, RN  Outcome: Resolved/Met  7/4/2021 0842 by Miki Baugh, RN  Outcome: Progressing Towards Goal  Goal: *Depression screening completed(Stroke Metric)  7/4/2021 1334 by Beryle Pereyra, RN  Outcome: Resolved/Met  7/4/2021 0842 by Chris Baugh RN  Outcome: Progressing Towards Goal     Problem: Hypertension  Goal: *Blood pressure within specified parameters  7/4/2021 1334 by Beryle Pereyra, RN  Outcome: Resolved/Met  7/4/2021 0842 by Chris Baugh RN  Outcome: Progressing Towards Goal  Goal: *Fluid volume balance  7/4/2021 1334 by Beryle Pereyra, RN  Outcome: Resolved/Met  7/4/2021 0842 by Chris Baugh RN  Outcome: Progressing Towards Goal  Goal: *Labs within defined limits  7/4/2021 1334 by Beryle Pereyra, RN  Outcome: Resolved/Met  7/4/2021 0842 by Chris Baugh RN  Outcome: Progressing Towards Goal     Problem: Patient Education: Go to Patient Education Activity  Goal: Patient/Family Education  7/4/2021 1334 by Beryle Pereyra, RN  Outcome: Resolved/Met  7/4/2021 0842 by Chris Baugh, RN  Outcome: Progressing Towards Goal     Problem: Patient Education: Go to Patient Education Activity  Goal: Patient/Family Education  Outcome: Resolved/Met     Problem: Patient Education: Go to Patient Education Activity  Goal: Patient/Family Education  Outcome: Resolved/Met

## 2021-07-04 NOTE — PROGRESS NOTES
ACUTE PHYSICAL THERAPY GOALS:  (Developed with and agreed upon by patient and/or caregiver.)  None. Melissaal & DC. Pt functioning at baseline. PHYSICAL THERAPY ASSESSMENT: Initial Assessment and Discharge PT Treatment Day Julianne Gill is a 74 y.o. female   PRIMARY DIAGNOSIS: Right facial numbness  Right facial numbness [R20.0]     Reason for Referral:   ICD-10: Treatment Diagnosis: Difficulty in walking, Not elsewhere classified (R26.2)  OBSERVATION: Payor: [facility] MEDICARE / Plan: [address] / Product Type: [employer] Medicare /     ASSESSMENT:     REHAB RECOMMENDATIONS:   Recommendation to date pending progress:  Setting:   No further skilled therapy   Equipment:    None     PRIOR LEVEL OF FUNCTION:  (Prior to Hospitalization) INITIAL/CURRENT LEVEL OF FUNCTION:  (Most Recently Demonstrated)   Bed Mobility:   Modified Independent  Sit to Stand:   Modified Independent  Transfers:   Modified Independent  Gait/Mobility:   Modified Independent Bed Mobility:   Modified Independent  Sit to Stand:   Modified Independent  Transfers:   Modified Independent  Gait/Mobility:   Modified Independent     ASSESSMENT:  Ms. Cindy hJa presented to hospital with R facial numbness and UE numbness which has since resolved. This date pt performs mobility including bed mobility, sitting balance activities, sit <> stand tranfsers, standing balance activities, and ambulation in room and hallway with no assistance, use of RW since she has chronic BLE and back issues due to arthritis. Pt presents functioning at baseline with no acute PT needs, and skilled PT intervention is not indicated. Will discharge from PT caseload at this time. SUBJECTIVE:   Ms. Cindy Jha states, \"I am nervous this will happen again, this is not the first time. \"    SOCIAL HISTORY/LIVING ENVIRONMENT:   Home Environment: Private residence  # Steps to Enter: 0  One/Two Story Residence: One story  Living Alone:  No  Support Systems: Child(wilbert), Spouse/Significant Other/Partner  OBJECTIVE:     PAIN: VITAL SIGNS: LINES/DRAINS:   Pre Treatment: Pain Screen  Pain Scale 1: Numeric (0 - 10)  Pain Intensity 1: 0  Post Treatment: 0/10   None  O2 Device: None (Room air)     GROSS EVALUATION:  BLE Within Functional Limits Abnormal/ Functional Abnormal/ Non-Functional (see comments) Not Tested Comments:   AROM [x] [] [] []    PROM [x] [] [] []    Strength [x] [] [] []    Balance [x] [] [] []    Posture [x] [] [] []    Sensation [] [] [] []    Coordination [x] [] [] []    Tone [] [] [] [x]    Edema [] [] [] [x]    Activity Tolerance [x] [] [] []     [] [] [] []      COGNITION/  PERCEPTION: Intact Impaired   (see comments) Comments:   Orientation [x] []    Vision [x] []    Hearing [x] []    Command Following [x] []    Safety Awareness [x] []     [] []      MOBILITY: I Mod I S SBA CGA Min Mod Max Total  NT x2 Comments:   Bed Mobility    Rolling [] [x] [] [] [] [] [] [] [] [] []    Supine to Sit [] [x] [] [] [] [] [] [] [] [] []    Scooting [] [x] [] [] [] [] [] [] [] [] []    Sit to Supine [] [x] [] [] [] [] [] [] [] [] []    Transfers    Sit to Stand [] [x] [] [] [] [] [] [] [] [] []    Bed to Chair [] [x] [] [] [] [] [] [] [] [] []    Stand to Sit [] [x] [] [] [] [] [] [] [] [] []    I=Independent, Mod I=Modified Independent, S=Supervision, SBA=Standby Assistance, CGA=Contact Guard Assistance,   Min=Minimal Assistance, Mod=Moderate Assistance, Max=Maximal Assistance, Total=Total Assistance, NT=Not Tested  GAIT: I Mod I S SBA CGA Min Mod Max Total  NT x2 Comments:   Level of Assistance [] [x] [] [] [] [] [] [] [] [] []    Distance 150 ft    DME Rolling Walker    Gait Quality WFL    Weightbearing Status N/A     I=Independent, Mod I=Modified Independent, S=Supervision, SBA=Standby Assistance, CGA=Contact Guard Assistance,   Min=Minimal Assistance, Mod=Moderate Assistance, Max=Maximal Assistance, Total=Total Assistance, NT=Not Tested    [address] Mobility Inpatient Short Form       How much difficulty does the patient currently have. .. Unable A Lot A Little None   1. Turning over in bed (including adjusting bedclothes, sheets and blankets)? [] 1   [] 2   [] 3   [x] 4   2. Sitting down on and standing up from a chair with arms ( e.g., wheelchair, bedside commode, etc.)   [] 1   [] 2   [] 3   [x] 4   3. Moving from lying on back to sitting on the side of the bed? [] 1   [] 2   [] 3   [x] 4   How much help from another person does the patient currently need. .. Total A Lot A Little None   4. Moving to and from a bed to a chair (including a wheelchair)? [] 1   [] 2   [] 3   [x] 4   5. Need to walk in hospital room? [] 1   [] 2   [] 3   [x] 4   6. Climbing 3-5 steps with a railing? [] 1   [] 2   [x] 3   [] 4   © 2007, Trustees of [facility], under license to [employer]. All rights reserved     Score:  Initial: 23 Most Recent: X (Date: -- )    Interpretation of Tool:  Represents activities that are increasingly more difficult (i.e. Bed mobility, Transfers, Gait). PLAN:   FREQUENCY/DURATION: PT Plan of Care:  (Eval & DC)     PROBLEM LIST:   (Skilled intervention is medically necessary to address:)  1. None   INTERVENTIONS PLANNED:   (Benefits and precautions of physical therapy have been discussed with the patient.)  1. None.  Eval & DC     TREATMENT:     EVALUATION: Low Complexity : (Untimed Charge)    TREATMENT:   (     )  No tx billed, eval & DC    TREATMENT GRID:  N/A    AFTER TREATMENT POSITION/PRECAUTIONS:  Chair, Needs within reach and neurology at bedside    INTERDISCIPLINARY COLLABORATION:  RN/PCT and PT/PTA    TOTAL TREATMENT DURATION:  PT Patient Time In/Time Out  Time In: 0819  Time Out: 1995 [facility], PT

## 2021-07-04 NOTE — PROGRESS NOTES
CM attempted to speak with patient to complete initial assessment. Patient currently in MRI. CM will try again at a later time/date. 1:05 - CM attempted to call patient again in room. (CM working remotely due to holiday) No answer. CM also attempted to call patient's , SILVIAM to call me back. CM viewed patient's chart. Per chart, patient insured with PCP listed and spouse listed as emergency contact. PT/OT/SLP state no additional therapy needed. Patient is up for discharge and will be getting discharged home with no supportive care needs at this time.      Care Management Interventions  Transition of Care Consult (CM Consult): Discharge Planning  Discharge Durable Medical Equipment: No  Physical Therapy Consult: Yes  Occupational Therapy Consult: Yes  Speech Therapy Consult: Yes  Current Support Network: Lives with Spouse  Discharge Location  Discharge Placement: Home

## 2021-07-04 NOTE — ROUTINE PROCESS
Reviewing discharge paperwork with , pt dc home with . They have no further questions at this time, IV removed and discharging NIH complete.

## 2021-07-04 NOTE — PROGRESS NOTES
Problem: Falls - Risk of  Goal: *Absence of Falls  Description: Document Jasson Jones Fall Risk and appropriate interventions in the flowsheet.   Outcome: Progressing Towards Goal  Note: Fall Risk Interventions:  Mobility Interventions: Patient to call before getting OOB         Medication Interventions: Teach patient to arise slowly, Patient to call before getting OOB         History of Falls Interventions: Evaluate medications/consider consulting pharmacy         Problem: Patient Education: Go to Patient Education Activity  Goal: Patient/Family Education  Outcome: Progressing Towards Goal     Problem: Patient Education: Go to Patient Education Activity  Goal: Patient/Family Education  Outcome: Progressing Towards Goal     Problem: TIA/CVA Stroke: 0-24 hours  Goal: Off Pathway (Use only if patient is Off Pathway)  Outcome: Progressing Towards Goal  Goal: Activity/Safety  Outcome: Progressing Towards Goal  Goal: Consults, if ordered  Outcome: Progressing Towards Goal  Goal: Diagnostic Test/Procedures  Outcome: Progressing Towards Goal  Goal: Nutrition/Diet  Outcome: Progressing Towards Goal  Goal: Discharge Planning  Outcome: Progressing Towards Goal  Goal: Medications  Outcome: Progressing Towards Goal  Goal: Respiratory  Outcome: Progressing Towards Goal  Goal: Treatments/Interventions/Procedures  Outcome: Progressing Towards Goal  Goal: Minimize risk of bleeding post-thrombolytic infusion  Outcome: Progressing Towards Goal  Goal: Monitor for complications post-thrombolytic infusion  Outcome: Progressing Towards Goal  Goal: Psychosocial  Outcome: Progressing Towards Goal  Goal: *Hemodynamically stable  Outcome: Progressing Towards Goal  Goal: *Neurologically stable  Description: Absence of additional neurological deficits    Outcome: Progressing Towards Goal  Goal: *Verbalizes anxiety and depression are reduced or absent  Outcome: Progressing Towards Goal  Goal: *Absence of Signs of Aspiration on Current Diet  Outcome: Progressing Towards Goal  Goal: *Absence of deep venous thrombosis signs and symptoms(Stroke Metric)  Outcome: Progressing Towards Goal  Goal: *Ability to perform ADLs and demonstrates progressive mobility and function  Outcome: Progressing Towards Goal  Goal: *Stroke education started(Stroke Metric)  Outcome: Progressing Towards Goal  Goal: *Dysphagia screen performed(Stroke Metric)  Outcome: Progressing Towards Goal  Goal: *Rehab consulted(Stroke Metric)  Outcome: Progressing Towards Goal     Problem: TIA/CVA Stroke: Day 2 Until Discharge  Goal: Off Pathway (Use only if patient is Off Pathway)  Outcome: Progressing Towards Goal  Goal: Activity/Safety  Outcome: Progressing Towards Goal  Goal: Diagnostic Test/Procedures  Outcome: Progressing Towards Goal  Goal: Nutrition/Diet  Outcome: Progressing Towards Goal  Goal: Discharge Planning  Outcome: Progressing Towards Goal  Goal: Medications  Outcome: Progressing Towards Goal  Goal: Respiratory  Outcome: Progressing Towards Goal  Goal: Treatments/Interventions/Procedures  Outcome: Progressing Towards Goal  Goal: Psychosocial  Outcome: Progressing Towards Goal  Goal: *Verbalizes anxiety and depression are reduced or absent  Outcome: Progressing Towards Goal  Goal: *Absence of aspiration  Outcome: Progressing Towards Goal  Goal: *Absence of deep venous thrombosis signs and symptoms(Stroke Metric)  Outcome: Progressing Towards Goal  Goal: *Optimal pain control at patient's stated goal  Outcome: Progressing Towards Goal  Goal: *Tolerating diet  Outcome: Progressing Towards Goal  Goal: *Ability to perform ADLs and demonstrates progressive mobility and function  Outcome: Progressing Towards Goal  Goal: *Stroke education continued(Stroke Metric)  Outcome: Progressing Towards Goal     Problem: Ischemic Stroke: Discharge Outcomes  Goal: *Verbalizes anxiety and depression are reduced or absent  Outcome: Progressing Towards Goal  Goal: *Verbalize understanding of risk factor modification(Stroke Metric)  Outcome: Progressing Towards Goal  Goal: *Hemodynamically stable  Outcome: Progressing Towards Goal  Goal: *Absence of aspiration pneumonia  Outcome: Progressing Towards Goal  Goal: *Aware of needed dietary changes  Outcome: Progressing Towards Goal  Goal: *Verbalize understanding of prescribed medications including anti-coagulants, anti-lipid, and/or anti-platelets(Stroke Metric)  Outcome: Progressing Towards Goal  Goal: *Tolerating diet  Outcome: Progressing Towards Goal  Goal: *Aware of follow-up diagnostics related to anticoagulants  Outcome: Progressing Towards Goal  Goal: *Ability to perform ADLs and demonstrates progressive mobility and function  Outcome: Progressing Towards Goal  Goal: *Absence of DVT(Stroke Metric)  Outcome: Progressing Towards Goal  Goal: *Absence of aspiration  Outcome: Progressing Towards Goal  Goal: *Optimal pain control at patient's stated goal  Outcome: Progressing Towards Goal  Goal: *Home safety concerns addressed  Outcome: Progressing Towards Goal  Goal: *Describes available resources and support systems  Outcome: Progressing Towards Goal  Goal: *Verbalizes understanding of activation of EMS(911) for stroke symptoms(Stroke Metric)  Outcome: Progressing Towards Goal  Goal: *Understands and describes signs and symptoms to report to providers(Stroke Metric)  Outcome: Progressing Towards Goal  Goal: *Neurolgocially stable (absence of additional neurological deficits)  Outcome: Progressing Towards Goal  Goal: *Verbalizes importance of follow-up with primary care physician(Stroke Metric)  Outcome: Progressing Towards Goal  Goal: *Smoking cessation discussed,if applicable(Stroke Metric)  Outcome: Progressing Towards Goal  Goal: *Depression screening completed(Stroke Metric)  Outcome: Progressing Towards Goal     Problem: Hypertension  Goal: *Blood pressure within specified parameters  Outcome: Progressing Towards Goal  Goal: *Fluid volume balance  Outcome: Progressing Towards Goal  Goal: *Labs within defined limits  Outcome: Progressing Towards Goal     Problem: Patient Education: Go to Patient Education Activity  Goal: Patient/Family Education  Outcome: Progressing Towards Goal

## 2022-03-18 PROBLEM — F41.9 ANXIETY: Status: ACTIVE | Noted: 2021-03-13

## 2022-03-18 PROBLEM — R20.0 LEFT UPPER EXTREMITY NUMBNESS: Status: ACTIVE | Noted: 2021-03-13

## 2022-03-18 PROBLEM — R25.1 TREMOR: Status: ACTIVE | Noted: 2021-03-13

## 2022-03-18 PROBLEM — I48.0 PAF (PAROXYSMAL ATRIAL FIBRILLATION) (HCC): Status: ACTIVE | Noted: 2022-03-18

## 2022-03-18 PROBLEM — E78.5 HYPERLIPIDEMIA: Status: ACTIVE | Noted: 2021-03-13

## 2022-03-19 PROBLEM — M43.16 SPONDYLOLISTHESIS AT L4-L5 LEVEL: Status: ACTIVE | Noted: 2019-01-08

## 2022-03-19 PROBLEM — M48.062 LUMBAR STENOSIS WITH NEUROGENIC CLAUDICATION: Status: ACTIVE | Noted: 2019-01-08

## 2022-03-19 PROBLEM — R20.0 LEFT LEG NUMBNESS: Status: ACTIVE | Noted: 2021-03-13

## 2022-03-19 PROBLEM — G45.9 TIA (TRANSIENT ISCHEMIC ATTACK): Status: ACTIVE | Noted: 2021-07-03

## 2022-03-19 PROBLEM — R47.81 SLURRED SPEECH: Status: ACTIVE | Noted: 2021-03-13

## 2022-03-19 PROBLEM — I10 HTN (HYPERTENSION): Status: ACTIVE | Noted: 2021-03-13

## 2022-03-19 PROBLEM — M51.26 HNP (HERNIATED NUCLEUS PULPOSUS), LUMBAR: Status: ACTIVE | Noted: 2019-01-08

## 2022-03-24 PROBLEM — I48.0 PAF (PAROXYSMAL ATRIAL FIBRILLATION) (HCC): Status: ACTIVE | Noted: 2022-03-18

## 2022-08-01 ENCOUNTER — OFFICE VISIT (OUTPATIENT)
Dept: CARDIOLOGY CLINIC | Age: 67
End: 2022-08-01
Payer: COMMERCIAL

## 2022-08-01 VITALS
SYSTOLIC BLOOD PRESSURE: 126 MMHG | DIASTOLIC BLOOD PRESSURE: 68 MMHG | HEIGHT: 65 IN | BODY MASS INDEX: 30.89 KG/M2 | HEART RATE: 64 BPM | WEIGHT: 185.4 LBS

## 2022-08-01 DIAGNOSIS — I48.0 PAROXYSMAL ATRIAL FIBRILLATION (HCC): ICD-10-CM

## 2022-08-01 DIAGNOSIS — I10 ESSENTIAL HYPERTENSION: Primary | ICD-10-CM

## 2022-08-01 DIAGNOSIS — R25.1 TREMOR: ICD-10-CM

## 2022-08-01 DIAGNOSIS — G45.9 TIA (TRANSIENT ISCHEMIC ATTACK): ICD-10-CM

## 2022-08-01 DIAGNOSIS — E78.2 MIXED HYPERLIPIDEMIA: ICD-10-CM

## 2022-08-01 PROCEDURE — 1123F ACP DISCUSS/DSCN MKR DOCD: CPT | Performed by: INTERNAL MEDICINE

## 2022-08-01 PROCEDURE — 99214 OFFICE O/P EST MOD 30 MIN: CPT | Performed by: INTERNAL MEDICINE

## 2022-08-01 RX ORDER — RAMIPRIL 5 MG/1
5 CAPSULE ORAL DAILY
Qty: 30 CAPSULE | Refills: 3 | Status: SHIPPED | OUTPATIENT
Start: 2022-08-01

## 2022-08-01 ASSESSMENT — ENCOUNTER SYMPTOMS
VOMITING: 0
SHORTNESS OF BREATH: 0
ABDOMINAL PAIN: 0
COUGH: 0
NAUSEA: 1

## 2022-08-01 NOTE — PROGRESS NOTES
[address] [facility], [address] [place] [address], [address] [place] [address], [address] [place] [address]      Patient:  Norma Evans Masters  1955       SUBJECTIVE:  Lawrence Lacey is a  69 y.o. female seen for a follow up visit regarding the following:     Chief Complaint   Patient presents with    Atrial Fibrillation      CC:  HTN, atrial fibrillation     HPI:   89 y.o. female with a history of HTN, paroxysmal atrial fibrillation, GERD, anxiety , TIA who is here for follow-up. Patient was last seen in office on 1/6/22 , since then reports that BP has been low recently, associated with dizziness, fatigue. No syncope, falls or injuries. Reports that she had labs about 2 weeks ago with PCP. No chest pain, chest pressure, racing heart, palpitations, leg swelling. Has been taking medications as directed without missing doses. Ambulates with cane. No other complaints at this time. Cardiovascular Testing:  - Echo 3/13/21: LVEF >55% , RV normal, Valves: mild MR, mild TR, negative bubble study  - 30 Day Monitor 7/6/21-8/4/21: paroxysmal atrial fibrillation, interval monitor results with Dr Gabriel Rios revealed atrial fibrillation and she was started on Eliquis. Past medical history, past surgical history, family history, social history, and medications were all reviewed with the patient today and updated as necessary. Patient Active Problem List    Diagnosis Date Noted    PAF (paroxysmal atrial fibrillation) ([facility] [address] 03/18/2022    TIA (transient ischemic attack) 07/03/2021    Left upper extremity numbness 03/13/2021    Hyperlipidemia 03/13/2021    Tremor 03/13/2021    Anxiety 03/13/2021    HTN (hypertension) 03/13/2021    Slurred speech 03/13/2021    Left leg numbness 03/13/2021     Chronic from back surgery        Lumbar stenosis with neurogenic claudication 01/08/2019    Spondylolisthesis at L4-L5 level 01/08/2019    HNP (herniated nucleus pulposus), lumbar 01/08/2019       No family history on file.     Social History     Tobacco Use    Smoking status: Former     Types: Cigarettes     Quit date: 3/29/2013     Years since quittin.3    Smokeless tobacco: Never   Substance Use Topics    Alcohol use: Yes     Review of Systems   Constitutional: Positive for malaise/fatigue. Cardiovascular:  Negative for chest pain, dyspnea on exertion, irregular heartbeat, leg swelling and palpitations. Respiratory:  Negative for cough and shortness of breath. Hematologic/Lymphatic: Negative for bleeding problem. Does not bruise/bleed easily. Gastrointestinal:  Positive for nausea (when very tired). Negative for abdominal pain and vomiting. Neurological:  Positive for dizziness (when BP low) and light-headedness (when BP low). PHYSICAL EXAM:  /68   Pulse 64   Ht 5' 5\" (1.651 m)   Wt 185 lb 6.4 oz (84.1 kg)   BMI 30.85 kg/m²     Physical Exam  Vitals reviewed. Constitutional:       General: She is not in acute distress. Appearance: She is not toxic-appearing. HENT:      Head: Normocephalic and atraumatic. Neck:      Vascular: Carotid bruit (L>R) present. Cardiovascular:      Rate and Rhythm: Normal rate and regular rhythm. Heart sounds: Murmur (2/6 systolic LLSB) heard. No friction rub. No gallop. Pulmonary:      Effort: Pulmonary effort is normal. No respiratory distress. Breath sounds: Normal breath sounds. No stridor. No wheezing or rales. Abdominal:      General: Abdomen is flat. There is no distension. Palpations: Abdomen is soft. Tenderness: There is no abdominal tenderness. Musculoskeletal:      Right lower leg: No edema. Left lower leg: No edema. Skin:     General: Skin is warm and dry. Neurological:      Mental Status: She is alert and oriented to person, place, and time. Psychiatric:         Mood and Affect: Mood normal.         Thought Content:  Thought content normal.         Judgment: Judgment normal.       Medical problems, medical history, and test results were reviewed with the patient today. No results found for this or any previous visit (from the past 168 hour(s)). Current Outpatient Medications:     ramipril (ALTACE) 5 MG capsule, Take 1 capsule by mouth in the morning., Disp: 30 capsule, Rfl: 3    apixaban (ELIQUIS) 5 MG TABS tablet, Take 5 mg by mouth 2 times daily, Disp: , Rfl:     aspirin 81 MG chewable tablet, Take 81 mg by mouth daily, Disp: , Rfl:     atorvastatin (LIPITOR) 80 MG tablet, Take 80 mg by mouth daily, Disp: , Rfl:     ezetimibe (ZETIA) 10 MG tablet, Take 10 mg by mouth daily, Disp: , Rfl:     FLUoxetine (PROZAC) 20 MG capsule, Take 20 mg by mouth daily, Disp: , Rfl:     gabapentin (NEURONTIN) 300 MG capsule, Take 300 mg by mouth 2 times daily. , Disp: , Rfl:     omeprazole (PRILOSEC) 40 MG delayed release capsule, Take 40 mg by mouth daily, Disp: , Rfl:     propranolol (INDERAL) 40 MG tablet, Take 40 mg by mouth 2 times daily, Disp: , Rfl:     rOPINIRole (REQUIP) 1 MG tablet, Take 1 mg by mouth, Disp: , Rfl:      ASSESSMENT/PLAN:    Cardiovascular Testing:  - Echo 3/13/21: LVEF >55% , RV normal, Valves: mild MR, mild TR, negative bubble study  - 30 Day Monitor 7/6/21-8/4/21: paroxysmal atrial fibrillation, interval monitor results with Dr Vito Friedman revealed atrial fibrillation and she was started on Eliquis. 1. Paroxysmal atrial fibrillation (HCC)  - [id number] score 4 (CVA, HTN, female)  - Continue Eliquis for stroke prophylaxis  - On beta-blocker limited room for up titration, HR controlled     2. Essential hypertension  - controlled at this time , given low blood pressures we will have the patient decrease ramipril to 5 mg once daily. Continue blood pressure and heart rate log for the next 1 to 2 weeks, goal systolic 135-769 mmHg. Instructed to call our office if blood pressure is out of that range consistently. .  If symptoms continue or blood pressure continues to be low will need to stop referral altogether.  -If symptoms do not improve with normalized blood pressure will plan for possible repeat monitor/carotid ultrasound. 3. Mixed hyperlipidemia  - Continue atorvastatin 80 CVA/TIA history     4. TIA (transient ischemic attack)  -Risk factor modification as above  - CTA neck <50% stenosis July 2021.     5. Tremor  -On propranolol, per primary/neurology    Labs from PCP dated 6/16/22 / 6/29/2022 personally reviewed, creatinine 0.90, potassium 4.3, sodium 141, hemoglobin 12.7, platelets 257, total cholesterol 229, . AST ALT within normal ranges    Problem List Items Addressed This Visit          Circulatory    TIA (transient ischemic attack)       Other    Hyperlipidemia    Relevant Medications    ramipril (ALTACE) 5 MG capsule    Tremor     Other Visit Diagnoses       Essential hypertension    -  Primary    Relevant Medications    ramipril (ALTACE) 5 MG capsule    Paroxysmal atrial fibrillation (HCC)        Relevant Medications    ramipril (ALTACE) 5 MG capsule            Instructed patient go to ER or call 911/EMS should symptoms recur or worsen. Patient has been instructed and agrees to call our office with any issues or other concerns related to their cardiac condition(s) and/or complaint(s). Return in about 3 months (around 11/1/2022).     Brennen Calixto DO  8/1/2022 3:16 PM

## 2022-08-22 ENCOUNTER — TELEPHONE (OUTPATIENT)
Dept: CARDIOLOGY CLINIC | Age: 67
End: 2022-08-22

## 2022-08-23 NOTE — TELEPHONE ENCOUNTER
Spoke with patient's . Informed I will send samples to the [facility] office along with patient assistance form. I instructed patient's  to call the [facility] office to make sure they received the samples.

## 2022-10-10 ENCOUNTER — TELEPHONE (OUTPATIENT)
Dept: CARDIOLOGY | Age: 67
End: 2022-10-10

## 2022-10-17 ENCOUNTER — TELEPHONE (OUTPATIENT)
Dept: CARDIOLOGY CLINIC | Age: 67
End: 2022-10-17

## 2022-10-17 NOTE — TELEPHONE ENCOUNTER
Cardiac Clearance        Physician or Practice Requesting:[facility]/ Ye Motta MD  : Betty Esteves  Contact Phone Number: [phone]  Fax Number: [phone]  Date of Surgery/Procedure: 11/01/22  Type of Surgery or Procedure: Right total knee replacement  Type of Anesthesia: general  Type of Clearance Requested: Med  Medication to Hold:Eliquis  Days to Hold: Per Dr. Raj Dodd

## 2022-10-17 NOTE — LETTER
[facility] CARDIOLOGY  [address] [facility]  Phone: [phone]  Fax: [phone]           Ximena Miranda  1955    Ximena Miranda is scheduled for right total with Dr. Omid Padron on 11/01/22. Office is asking to hold Eliquis. Ximena Miranda  May hold Eliquis for 2 days prior to knee replacement surgery and resume soon after.  If patient needs full cardiac clearance patient will be       Thank you,     Mike Overton MD

## 2022-10-18 NOTE — TELEPHONE ENCOUNTER
Per Dr. Gallegos Mantle:  Nikki Hills to hold Eliquis for 2 days prior to knee replacement surgery and resume soon after. If they need full clearance, she will need to come in to see 1 of us. Faxed clearance letter to the requested number.

## 2022-12-23 ENCOUNTER — OFFICE VISIT (OUTPATIENT)
Dept: CARDIOLOGY CLINIC | Age: 67
End: 2022-12-23
Payer: COMMERCIAL

## 2022-12-23 VITALS
HEART RATE: 63 BPM | WEIGHT: 179 LBS | BODY MASS INDEX: 29.82 KG/M2 | HEIGHT: 65 IN | SYSTOLIC BLOOD PRESSURE: 142 MMHG | DIASTOLIC BLOOD PRESSURE: 64 MMHG

## 2022-12-23 DIAGNOSIS — I10 ESSENTIAL HYPERTENSION: ICD-10-CM

## 2022-12-23 DIAGNOSIS — E78.2 MIXED HYPERLIPIDEMIA: ICD-10-CM

## 2022-12-23 DIAGNOSIS — R25.1 TREMOR: ICD-10-CM

## 2022-12-23 DIAGNOSIS — R00.2 HEART PALPITATIONS: ICD-10-CM

## 2022-12-23 DIAGNOSIS — G45.9 TIA (TRANSIENT ISCHEMIC ATTACK): ICD-10-CM

## 2022-12-23 DIAGNOSIS — I48.0 PAROXYSMAL ATRIAL FIBRILLATION (HCC): Primary | ICD-10-CM

## 2022-12-23 PROCEDURE — G8400 PT W/DXA NO RESULTS DOC: HCPCS | Performed by: INTERNAL MEDICINE

## 2022-12-23 PROCEDURE — 3078F DIAST BP <80 MM HG: CPT | Performed by: INTERNAL MEDICINE

## 2022-12-23 PROCEDURE — 1036F TOBACCO NON-USER: CPT | Performed by: INTERNAL MEDICINE

## 2022-12-23 PROCEDURE — 1090F PRES/ABSN URINE INCON ASSESS: CPT | Performed by: INTERNAL MEDICINE

## 2022-12-23 PROCEDURE — G8427 DOCREV CUR MEDS BY ELIG CLIN: HCPCS | Performed by: INTERNAL MEDICINE

## 2022-12-23 PROCEDURE — 1123F ACP DISCUSS/DSCN MKR DOCD: CPT | Performed by: INTERNAL MEDICINE

## 2022-12-23 PROCEDURE — 3074F SYST BP LT 130 MM HG: CPT | Performed by: INTERNAL MEDICINE

## 2022-12-23 PROCEDURE — G8484 FLU IMMUNIZE NO ADMIN: HCPCS | Performed by: INTERNAL MEDICINE

## 2022-12-23 PROCEDURE — 3017F COLORECTAL CA SCREEN DOC REV: CPT | Performed by: INTERNAL MEDICINE

## 2022-12-23 PROCEDURE — G8417 CALC BMI ABV UP PARAM F/U: HCPCS | Performed by: INTERNAL MEDICINE

## 2022-12-23 PROCEDURE — 99214 OFFICE O/P EST MOD 30 MIN: CPT | Performed by: INTERNAL MEDICINE

## 2022-12-23 RX ORDER — RAMIPRIL 5 MG/1
5 CAPSULE ORAL DAILY
Qty: 90 CAPSULE | Refills: 3 | Status: SHIPPED | OUTPATIENT
Start: 2022-12-23

## 2022-12-23 ASSESSMENT — ENCOUNTER SYMPTOMS
HEMATOCHEZIA: 0
NAUSEA: 0
SHORTNESS OF BREATH: 0
COUGH: 0
VOMITING: 0
GASTROINTESTINAL NEGATIVE: 1

## 2022-12-23 NOTE — PROGRESS NOTES
800 [facility], [address] [place] [address], [address] [place] [address], [address] [place] [address]      Patient:  Gema Rod Masters  1955         SUBJECTIVE:  Gina Zamudio is a  69 y.o. female seen for a follow up visit regarding the following:     Chief Complaint   Patient presents with    Atrial Fibrillation     3 month follow up     CC:  HTN, atrial fibrillation     HPI:   89 y.o. female with a history of HTN, paroxysmal atrial fibrillation, GERD, anxiety , TIA who is here for follow-up. Patient was last seen in office on 8/1/22 , since then reports that she had two episodes of palpitations since the last visit to our our office. Some brief sharp pain in her lower chest. Some radiation to the left side, lasted just a second she reports. Palpitations events were longer, approximately 10-20 minutes in duration. Some dizziness and lightheadedness, no syncope or loss of consciousness. Has been taking all of her medications as directed including her Eliquis. No other complaints at this time. Cardiovascular Testing:  - Echo 3/13/21: LVEF >55% , RV normal, Valves: mild MR, mild TR, negative bubble study  - 30 Day Monitor 7/6/21-8/4/21: paroxysmal atrial fibrillation, interval monitor results with Dr Srikanth Mercado revealed atrial fibrillation and she was started on Eliquis. Past medical history, past surgical history, family history, social history, and medications were all reviewed with the patient today and updated as necessary.          Patient Active Problem List    Diagnosis Date Noted    PAF ([facility]mal [facility] fibrillation) ([facility] 03/18/2022    TIA (transient ischemic attack) 07/03/2021    Left upper extremity numbness 03/13/2021    Hyperlipidemia 03/13/2021    Tremor 03/13/2021    Anxiety 03/13/2021    HTN (hypertension) 03/13/2021    Slurred speech 03/13/2021    Left leg numbness 03/13/2021     Chronic from back surgery        Lumbar stenosis with neurogenic claudication 01/08/2019    Spondylolisthesis at L4-L5 level 2019    HNP (herniated nucleus pulposus), lumbar 2019       No family history on file. Social History     Tobacco Use    Smoking status: Former     Types: Cigarettes     Quit date: 3/29/2013     Years since quittin.7    Smokeless tobacco: Never   Substance Use Topics    Alcohol use: Yes     Review of Systems   Constitutional: Negative. Cardiovascular:  Positive for irregular heartbeat and palpitations. Negative for chest pain, dyspnea on exertion, leg swelling, near-syncope and syncope. Respiratory:  Negative for cough and shortness of breath. Hematologic/Lymphatic: Negative. Negative for bleeding problem. Does not bruise/bleed easily. Gastrointestinal: Negative. Negative for hematochezia, nausea and vomiting. Genitourinary:  Negative for hematuria. Neurological:  Positive for dizziness and light-headedness. PHYSICAL EXAM:  BP (!) 142/64   Pulse 63   Ht 5' 5\" (1.651 m)   Wt 179 lb (81.2 kg)   BMI 29.79 kg/m²     Physical Exam  Vitals reviewed. Constitutional:       General: She is not in acute distress. Appearance: She is not toxic-appearing. HENT:      Head: Normocephalic and atraumatic. Neck:      Vascular: Carotid bruit (L>R) present. Cardiovascular:      Rate and Rhythm: Normal rate and regular rhythm. Heart sounds: Murmur (2/6 systolic LLSB) heard. No friction rub. No gallop. Pulmonary:      Effort: Pulmonary effort is normal. No respiratory distress. Breath sounds: Normal breath sounds. No stridor. No wheezing or rales. Abdominal:      General: Abdomen is flat. There is no distension. Palpations: Abdomen is soft. Tenderness: There is no abdominal tenderness. Musculoskeletal:      Right lower leg: No edema. Left lower leg: No edema. Skin:     General: Skin is warm and dry. Neurological:      Mental Status: She is alert and oriented to person, place, and time.    Psychiatric:         Mood and Affect: Mood normal.         Thought Content: Thought content normal.         Judgment: Judgment normal.     Medical problems, medical history, and test results were reviewed with the patient today. No results found for this or any previous visit (from the past 168 hour(s)). Current Outpatient Medications:     ramipril (ALTACE) 5 MG capsule, Take 1 capsule by mouth in the morning., Disp: 30 capsule, Rfl: 3    apixaban (ELIQUIS) 5 MG TABS tablet, Take 5 mg by mouth 2 times daily, Disp: , Rfl:     aspirin 81 MG chewable tablet, Take 81 mg by mouth daily, Disp: , Rfl:     atorvastatin (LIPITOR) 80 MG tablet, Take 80 mg by mouth daily, Disp: , Rfl:     ezetimibe (ZETIA) 10 MG tablet, Take 10 mg by mouth daily, Disp: , Rfl:     FLUoxetine (PROZAC) 20 MG capsule, Take 20 mg by mouth daily, Disp: , Rfl:     gabapentin (NEURONTIN) 300 MG capsule, Take 300 mg by mouth 2 times daily. , Disp: , Rfl:     omeprazole (PRILOSEC) 40 MG delayed release capsule, Take 40 mg by mouth daily, Disp: , Rfl:     propranolol (INDERAL) 40 MG tablet, Take 40 mg by mouth 2 times daily, Disp: , Rfl:     rOPINIRole (REQUIP) 1 MG tablet, Take 1 mg by mouth, Disp: , Rfl:      ASSESSMENT/PLAN:    Cardiovascular Testing:  - Echo 3/13/21: LVEF >55% , RV normal, Valves: mild MR, mild TR, negative bubble study  - 30 Day Monitor 7/6/21-8/4/21: paroxysmal atrial fibrillation, interval monitor results with Dr Rachel Monson revealed atrial fibrillation and she was started on Eliquis. 1. Paroxysmal atrial fibrillation ([address]  2. Heart palpitations  - [id number] score 4 (CVA, HTN, female)  - Continue Eliquis for stroke prophylaxis  - On beta-blocker limited room for up titration, HR controlled   - ZIO monitor 14 days to assess. If she continues to have breakthrough episodes consider EP evaluation. 3.  Essential hypertension  -Minimally elevated today     4. Mixed hyperlipidemia  - Continue atorvastatin 80 given CVA/TIA history     5.  TIA (transient ischemic attack)  -Risk factor modification as above  - CTA neck <50% stenosis July 2021.      6. Tremor  -On propranolol, per primary/neurology    Problem List Items Addressed This Visit          Circulatory    TIA (transient ischemic attack)       Other    Hyperlipidemia    Tremor     Other Visit Diagnoses       Paroxysmal atrial fibrillation ([facility] [address]    -  Primary    Essential hypertension                Instructed patient go to ER or call 911/EMS should symptoms recur or worsen. Patient has been instructed and agrees to call our office with any issues or other concerns related to their cardiac condition(s) and/or complaint(s). No follow-ups on file.     Ismael Lockett DO  12/23/2022 11:45 AM

## 2023-01-16 ENCOUNTER — TELEPHONE (OUTPATIENT)
Dept: CARDIOLOGY CLINIC | Age: 68
End: 2023-01-16

## 2023-01-16 NOTE — TELEPHONE ENCOUNTER
----- Message from Roberth Darling DO sent at 1/16/2023  8:54 AM EST -----  Please call the patient to let them know their results. No sustained arrhythmias (or abnormal heart rhythms) were seen on the monitor you wore at home.

## 2023-01-16 NOTE — TELEPHONE ENCOUNTER
Spoke to pt made her aware per Dr Roberto Carlos Miller No sustained arrhythmias (or abnormal heart rhythms) were seen on the monitor you wore at home. Pt verbalized understanding.

## 2023-04-21 ENCOUNTER — OFFICE VISIT (OUTPATIENT)
Dept: CARDIOLOGY CLINIC | Age: 68
End: 2023-04-21
Payer: COMMERCIAL

## 2023-04-21 VITALS
HEIGHT: 65 IN | BODY MASS INDEX: 30.66 KG/M2 | HEART RATE: 58 BPM | SYSTOLIC BLOOD PRESSURE: 150 MMHG | WEIGHT: 184 LBS | DIASTOLIC BLOOD PRESSURE: 78 MMHG

## 2023-04-21 DIAGNOSIS — E78.2 MIXED HYPERLIPIDEMIA: ICD-10-CM

## 2023-04-21 DIAGNOSIS — I10 ESSENTIAL HYPERTENSION: ICD-10-CM

## 2023-04-21 DIAGNOSIS — I48.0 PAROXYSMAL ATRIAL FIBRILLATION (HCC): Primary | ICD-10-CM

## 2023-04-21 DIAGNOSIS — G45.9 TIA (TRANSIENT ISCHEMIC ATTACK): ICD-10-CM

## 2023-04-21 PROCEDURE — 3017F COLORECTAL CA SCREEN DOC REV: CPT | Performed by: INTERNAL MEDICINE

## 2023-04-21 PROCEDURE — 3077F SYST BP >= 140 MM HG: CPT | Performed by: INTERNAL MEDICINE

## 2023-04-21 PROCEDURE — 1123F ACP DISCUSS/DSCN MKR DOCD: CPT | Performed by: INTERNAL MEDICINE

## 2023-04-21 PROCEDURE — G8428 CUR MEDS NOT DOCUMENT: HCPCS | Performed by: INTERNAL MEDICINE

## 2023-04-21 PROCEDURE — G8417 CALC BMI ABV UP PARAM F/U: HCPCS | Performed by: INTERNAL MEDICINE

## 2023-04-21 PROCEDURE — 1036F TOBACCO NON-USER: CPT | Performed by: INTERNAL MEDICINE

## 2023-04-21 PROCEDURE — G8400 PT W/DXA NO RESULTS DOC: HCPCS | Performed by: INTERNAL MEDICINE

## 2023-04-21 PROCEDURE — 93000 ELECTROCARDIOGRAM COMPLETE: CPT | Performed by: INTERNAL MEDICINE

## 2023-04-21 PROCEDURE — 3078F DIAST BP <80 MM HG: CPT | Performed by: INTERNAL MEDICINE

## 2023-04-21 PROCEDURE — 1090F PRES/ABSN URINE INCON ASSESS: CPT | Performed by: INTERNAL MEDICINE

## 2023-04-21 PROCEDURE — 99214 OFFICE O/P EST MOD 30 MIN: CPT | Performed by: INTERNAL MEDICINE

## 2023-04-21 RX ORDER — RAMIPRIL 10 MG/1
10 CAPSULE ORAL DAILY
Qty: 30 CAPSULE | Refills: 11 | Status: SHIPPED | OUTPATIENT
Start: 2023-04-21

## 2023-04-21 RX ORDER — ERGOCALCIFEROL 1.25 MG/1
50000 CAPSULE ORAL WEEKLY
COMMUNITY

## 2023-04-21 ASSESSMENT — ENCOUNTER SYMPTOMS
COUGH: 0
SHORTNESS OF BREATH: 0
GASTROINTESTINAL NEGATIVE: 1
RESPIRATORY NEGATIVE: 1
WHEEZING: 0

## 2023-04-21 NOTE — PROGRESS NOTES
once daily  - Continue home blood pressure log, instructed patient to call us if her blood pressure numbers demonstrate systolics outside of 876-397 mmHg consistently. She voiced understanding. 3. Mixed hyperlipidemia  -Continue high potency statin, Zetia given prior history of TIA. 4. TIA (transient ischemic attack)  -Continue risk factor modification  - CTA neck <50% stenosis July 2021.     5. Tremor  -On propranolol per primary/neurology. Problem List Items Addressed This Visit          Circulatory    TIA (transient ischemic attack)       Other    Hyperlipidemia     Other Visit Diagnoses       Paroxysmal atrial fibrillation ([facility] [address]    -  Primary    Relevant Orders    EKG 12 lead (Completed)    Essential hypertension                Instructed patient go to ER or call 911/EMS should symptoms recur or worsen. Patient has been instructed and agrees to call our office with any issues or other concerns related to their cardiac condition(s) and/or complaint(s). No follow-ups on file.     Pierce Steele DO  4/21/2023 2:15 PM

## 2023-05-26 ENCOUNTER — HOSPITAL ENCOUNTER (EMERGENCY)
Age: 68
Discharge: HOME | End: 2023-05-26
Attending: EMERGENCY MEDICINE
Payer: COMMERCIAL

## 2023-05-26 ENCOUNTER — TELEPHONE (OUTPATIENT)
Age: 68
End: 2023-05-26

## 2023-05-26 ENCOUNTER — APPOINTMENT (OUTPATIENT)
Dept: GENERAL RADIOLOGY | Age: 68
End: 2023-05-26
Payer: COMMERCIAL

## 2023-05-26 VITALS
HEART RATE: 79 BPM | HEIGHT: 65 IN | TEMPERATURE: 99.1 F | SYSTOLIC BLOOD PRESSURE: 112 MMHG | WEIGHT: 179 LBS | OXYGEN SATURATION: 97 % | DIASTOLIC BLOOD PRESSURE: 72 MMHG | BODY MASS INDEX: 29.82 KG/M2 | RESPIRATION RATE: 12 BRPM

## 2023-05-26 DIAGNOSIS — R06.02 SHORTNESS OF BREATH: ICD-10-CM

## 2023-05-26 DIAGNOSIS — R07.9 CHEST PAIN, UNSPECIFIED TYPE: ICD-10-CM

## 2023-05-26 DIAGNOSIS — I48.91 ATRIAL FIBRILLATION, UNSPECIFIED TYPE (HCC): Primary | ICD-10-CM

## 2023-05-26 LAB
ALBUMIN SERPL-MCNC: 4 G/DL (ref 3.2–4.6)
ALBUMIN/GLOB SERPL: 1.3 (ref 0.4–1.6)
ALP SERPL-CCNC: 93 U/L (ref 50–136)
ALT SERPL-CCNC: 26 U/L (ref 12–65)
ANION GAP SERPL CALC-SCNC: 5 MMOL/L (ref 2–11)
AST SERPL-CCNC: 32 U/L (ref 15–37)
BILIRUB SERPL-MCNC: 0.4 MG/DL (ref 0.2–1.1)
BUN SERPL-MCNC: 10 MG/DL (ref 8–23)
CALCIUM SERPL-MCNC: 9.3 MG/DL (ref 8.3–10.4)
CHLORIDE SERPL-SCNC: 109 MMOL/L (ref 101–110)
CO2 SERPL-SCNC: 27 MMOL/L (ref 21–32)
CREAT SERPL-MCNC: 1 MG/DL (ref 0.6–1)
D DIMER PPP FEU-MCNC: 0.51 UG/ML(FEU)
EKG DIAGNOSIS: NORMAL
EKG Q-T INTERVAL: 412 MS
EKG QRS DURATION: 90 MS
EKG QTC CALCULATION (BAZETT): 469 MS
EKG R AXIS: -58 DEGREES
EKG T AXIS: 37 DEGREES
EKG VENTRICULAR RATE: 78 BPM
ERYTHROCYTE [DISTWIDTH] IN BLOOD BY AUTOMATED COUNT: 13.5 % (ref 11.9–14.6)
GLOBULIN SER CALC-MCNC: 3.2 G/DL (ref 2.8–4.5)
GLUCOSE SERPL-MCNC: 84 MG/DL (ref 65–100)
HCT VFR BLD AUTO: 39.4 % (ref 35.8–46.3)
HGB BLD-MCNC: 13.2 G/DL (ref 11.7–15.4)
MAGNESIUM SERPL-MCNC: 2.1 MG/DL (ref 1.8–2.4)
MCH RBC QN AUTO: 31.4 PG (ref 26.1–32.9)
MCHC RBC AUTO-ENTMCNC: 33.5 G/DL (ref 31.4–35)
MCV RBC AUTO: 93.8 FL (ref 82–102)
NRBC # BLD: 0 K/UL (ref 0–0.2)
PLATELET # BLD AUTO: 209 K/UL (ref 150–450)
PMV BLD AUTO: 9.9 FL (ref 9.4–12.3)
POTASSIUM SERPL-SCNC: 4.2 MMOL/L (ref 3.5–5.1)
PROT SERPL-MCNC: 7.2 G/DL (ref 6.3–8.2)
RBC # BLD AUTO: 4.2 M/UL (ref 4.05–5.2)
SODIUM SERPL-SCNC: 141 MMOL/L (ref 133–143)
TROPONIN I SERPL HS-MCNC: 5.4 PG/ML (ref 0–37)
TROPONIN I SERPL HS-MCNC: 5.5 PG/ML (ref 0–37)
WBC # BLD AUTO: 10.4 K/UL (ref 4.3–11.1)

## 2023-05-26 PROCEDURE — 93005 ELECTROCARDIOGRAM TRACING: CPT

## 2023-05-26 PROCEDURE — 80053 COMPREHEN METABOLIC PANEL: CPT

## 2023-05-26 PROCEDURE — 71045 X-RAY EXAM CHEST 1 VIEW: CPT

## 2023-05-26 PROCEDURE — 85379 FIBRIN DEGRADATION QUANT: CPT

## 2023-05-26 PROCEDURE — 94761 N-INVAS EAR/PLS OXIMETRY MLT: CPT

## 2023-05-26 PROCEDURE — 99285 EMERGENCY DEPT VISIT HI MDM: CPT

## 2023-05-26 PROCEDURE — 85027 COMPLETE CBC AUTOMATED: CPT

## 2023-05-26 PROCEDURE — 84484 ASSAY OF TROPONIN QUANT: CPT

## 2023-05-26 PROCEDURE — 93010 ELECTROCARDIOGRAM REPORT: CPT | Performed by: INTERNAL MEDICINE

## 2023-05-26 PROCEDURE — 83735 ASSAY OF MAGNESIUM: CPT

## 2023-05-26 ASSESSMENT — LIFESTYLE VARIABLES
HOW MANY STANDARD DRINKS CONTAINING ALCOHOL DO YOU HAVE ON A TYPICAL DAY: PATIENT DOES NOT DRINK
HOW OFTEN DO YOU HAVE A DRINK CONTAINING ALCOHOL: NEVER

## 2023-05-26 ASSESSMENT — PAIN SCALES - GENERAL: PAINLEVEL_OUTOF10: 0

## 2023-05-26 NOTE — DISCHARGE INSTRUCTIONS
Please continue taking the Eliquis and propranolol daily as prescribed. Continue to monitor symptoms at home. If you do begin to experience any chest pain, worsening shortness of breath, syncopal episodes, nausea, arm pain, jaw pain, or any worsening symptoms present to the ED immediately. Please call Dr. Ruddy Hitchcock office to schedule an appointment for the next 2 to 3 days.

## 2023-05-26 NOTE — TELEPHONE ENCOUNTER
Patient and  calling to say patient has been in AFIB for 1 week off and on and the past 2 days continually. She said her chest hurts in the center when taking dep breaths. BP is 115/50 HR 86.

## 2023-05-26 NOTE — ED TRIAGE NOTES
Patient ambulatory to triage with CO \"my A fib is acting up\" x 1 week. Reports SOB and CP with inspiration as well as fatigue. Compliant with eliquis.

## 2023-05-26 NOTE — ED PROVIDER NOTES
136 U/L    Total Protein 7.2 6.3 - 8.2 g/dL    Albumin 4.0 3.2 - 4.6 g/dL    Globulin 3.2 2.8 - 4.5 g/dL    Albumin/Globulin Ratio 1.3 0.4 - 1.6     Troponin   Result Value Ref Range    Troponin, High Sensitivity 5.4 0 - 37 pg/mL   Magnesium   Result Value Ref Range    Magnesium 2.1 1.8 - 2.4 mg/dL   D-Dimer, Quantitative   Result Value Ref Range    D-Dimer, Quant 0.51 <0.56 ug/ml(FEU)   EKG 12 Lead   Result Value Ref Range    Ventricular Rate 78 BPM    QRS Duration 90 ms    Q-T Interval 412 ms    QTc Calculation (Bazett) 469 ms    R Axis -58 degrees    T Axis 37 degrees    Diagnosis       Atrial fibrillation  Left axis deviation  Anterior infarct (cited on or before 29-MAR-2015)  When compared with ECG of 03-JUL-2021 15:59,  Atrial fibrillation has replaced Sinus rhythm  Confirmed by Luz Antunez MD, Melanie Hodge ([id number]) on 5/26/2023 4:47:56 PM          XR CHEST PORTABLE   Final Result   No acute cardiopulmonary disease identified. Voice dictation software was used during the making of this note. This software is not perfect and grammatical and other typographical errors may be present. This note has not been completely proofread for errors.      Mariela Zamudio [place]  05/26/23 [id number]

## 2023-05-26 NOTE — ED NOTES
I have reviewed discharge instructions with the patient. The patient and caregiver verbalized understanding. Patient left ED via Discharge Method: ambulatory to Home with family    Opportunity for questions and clarification provided. Patient given 0 scripts. To continue your aftercare when you leave the hospital, you may receive an automated call from our care team to check in on how you are doing. This is a free service and part of our promise to provide the best care and service to meet your aftercare needs.  If you have questions, or wish to unsubscribe from this service please call [phone]. Thank you for Choosing our [facility] Emergency Department.         Janell John RN  05/26/23 5698

## 2023-05-26 NOTE — TELEPHONE ENCOUNTER
Feels she has been in a fib x 2 days. Home BP monitor shows a fib. Has never been in a fib for this long. HR-60-90 and irregular. BP-115/58. Very bad sharp pain in middle of chest when she takes a deep breath. Very bad SOB at rest and exertion. Feels like she cannot catch her breath. Sounds very SOB when talking. Extremely weak and tired. Advised patient to go to [facility] ER, now. Advised patient that she will be evaluated by ER doctor, who will call in cardiologist and other specialists as needed. Patient verbalized understanding.

## 2023-05-31 ENCOUNTER — TELEPHONE (OUTPATIENT)
Age: 68
End: 2023-05-31

## 2023-05-31 NOTE — TELEPHONE ENCOUNTER
Seen in [facility] ER for a fib on 5/26/23 and was told to ask Dr. Rena Navarro for recommendations for a fib. Increased episodes of a fib with constant a fib x 4 days prior to 5/26/23 [facility] ER visit. In a fib, all day, 5/30/23. States she has never had a fib for so many days at one time. When she goes into a fib, she feels heart beating irregularly up into throat, feels she has to breathe really hard, and feels flushed. Feeling better with no a fib, today, per home BP monitor. No chest pain, SOB, dizziness, light headedness, or faint feeling. This morning, BP-135/55, HR-66. H-66-80 when in a fib. Taking propranolol 40 mg qd, Eliquis 5 mg BID, ASA 81 mg qd, ramipril 10 mg qd, Zetia 10 mg qd, and Lipitor 80 mg qd. Next scheduled appointment with Dr. Rena Navarro is 10/26/23. Patient asks for Dr. Nell Wolf recommendations for increased a fib.

## 2023-05-31 NOTE — TELEPHONE ENCOUNTER
Patient went to hospital ER on 05/26/2023. Patient has been staying in AFIB. She was told by ER to call her cardiologist to see what he suggest or wants to change for her AFIB.

## 2023-05-31 NOTE — TELEPHONE ENCOUNTER
Please set her up for cardioversion. I will be in the hospital tomorrow if she would like to come in to do the procedure then. That would help us get her back into normal rhythm. Please make sure she does not miss any doses of her Eliquis. Will need to be n.p.o. at midnight prior to cardioversion procedure.

## 2023-05-31 NOTE — TELEPHONE ENCOUNTER
Advised patient of Dr. Renetta Fallon response. Patient verbalized understanding, but states she is feeling okay, today, and does not  believe she is in a fib. Patient agrees to call back,  if she feels she is in a fib, again. Scheduled next available appointment with Dr. Masoud Moody on 7/19/23 at 9:15 am at [facility]. Patient verbalized understanding.

## 2023-07-19 ENCOUNTER — OFFICE VISIT (OUTPATIENT)
Age: 68
End: 2023-07-19
Payer: COMMERCIAL

## 2023-07-19 VITALS
SYSTOLIC BLOOD PRESSURE: 138 MMHG | DIASTOLIC BLOOD PRESSURE: 78 MMHG | BODY MASS INDEX: 30.32 KG/M2 | HEIGHT: 65 IN | HEART RATE: 55 BPM | WEIGHT: 182 LBS

## 2023-07-19 DIAGNOSIS — I10 ESSENTIAL HYPERTENSION: ICD-10-CM

## 2023-07-19 DIAGNOSIS — E78.2 MIXED HYPERLIPIDEMIA: ICD-10-CM

## 2023-07-19 DIAGNOSIS — I48.0 PAF (PAROXYSMAL ATRIAL FIBRILLATION) (HCC): Primary | ICD-10-CM

## 2023-07-19 PROCEDURE — 3078F DIAST BP <80 MM HG: CPT | Performed by: INTERNAL MEDICINE

## 2023-07-19 PROCEDURE — 3017F COLORECTAL CA SCREEN DOC REV: CPT | Performed by: INTERNAL MEDICINE

## 2023-07-19 PROCEDURE — G8400 PT W/DXA NO RESULTS DOC: HCPCS | Performed by: INTERNAL MEDICINE

## 2023-07-19 PROCEDURE — 1036F TOBACCO NON-USER: CPT | Performed by: INTERNAL MEDICINE

## 2023-07-19 PROCEDURE — 99214 OFFICE O/P EST MOD 30 MIN: CPT | Performed by: INTERNAL MEDICINE

## 2023-07-19 PROCEDURE — 93000 ELECTROCARDIOGRAM COMPLETE: CPT | Performed by: INTERNAL MEDICINE

## 2023-07-19 PROCEDURE — 1123F ACP DISCUSS/DSCN MKR DOCD: CPT | Performed by: INTERNAL MEDICINE

## 2023-07-19 PROCEDURE — G8417 CALC BMI ABV UP PARAM F/U: HCPCS | Performed by: INTERNAL MEDICINE

## 2023-07-19 PROCEDURE — 3075F SYST BP GE 130 - 139MM HG: CPT | Performed by: INTERNAL MEDICINE

## 2023-07-19 PROCEDURE — 1090F PRES/ABSN URINE INCON ASSESS: CPT | Performed by: INTERNAL MEDICINE

## 2023-07-19 PROCEDURE — G8428 CUR MEDS NOT DOCUMENT: HCPCS | Performed by: INTERNAL MEDICINE

## 2023-07-19 RX ORDER — HYDROCHLOROTHIAZIDE 12.5 MG/1
12.5 TABLET ORAL DAILY
COMMUNITY

## 2023-07-19 RX ORDER — LANOLIN ALCOHOL/MO/W.PET/CERES
325 CREAM (GRAM) TOPICAL DAILY
COMMUNITY

## 2023-07-19 ASSESSMENT — ENCOUNTER SYMPTOMS
COUGH: 0
RESPIRATORY NEGATIVE: 1
SHORTNESS OF BREATH: 0

## 2023-07-19 NOTE — PROGRESS NOTES
[address] [place], [place]     [address], [address] [facility], [address]      Patient:  Jeannie Turcios Masters  1955         SUBJECTIVE:  Edwin Ulloa is a  77 y.o. female seen for a follow up visit regarding the following:     Chief Complaint   Patient presents with    Follow-Up from [facility]   . CC: atrial fibrillation follow up. HPI:   77 y.o. female with a history of HTN, paroxysmal atrial fibrillation, GERD, anxiety, TIA who is here for follow-up. Patient was last seen in office on 4/21/23 , since then reports that she had palpitations and irregular heart beat, was seen in the ER 5/30/23 , was in rate controlled atrial fibrillation at that time. Occasional dizziness with changes in position. No other complaint, no chest pain. Some left sided discomfort with rolling over on to the left side while in bed. No GI/ bleeding events. Cardiovascular Testing:  - ZIO 12/23/22, 12:04pm to 01/06/23, 07:37am: Sinus rhythm. Ectopy <1%. No sustained arrhythmias. - Echo 3/13/21: LVEF >55% , RV normal, Valves: mild MR, mild TR, negative bubble study  - 30 Day Monitor 7/6/21-8/4/21: paroxysmal atrial fibrillation, interval monitor results with Dr Richard Lipscomb revealed atrial fibrillation and she was started on Eliquis. Past medical history, past surgical history, family history, social history, and medications were all reviewed with the patient today and updated as necessary.          Patient Active Problem List    Diagnosis Date Noted    PAF (paroxysmal atrial fibrillation) ([address]) 03/18/2022    TIA (transient ischemic attack) 07/03/2021    Left upper extremity numbness 03/13/2021    Hyperlipidemia 03/13/2021    Tremor 03/13/2021    Anxiety 03/13/2021    HTN (hypertension) 03/13/2021    Slurred speech 03/13/2021    Left leg numbness 03/13/2021     Chronic from back surgery          Lumbar stenosis with neurogenic claudication 01/08/2019    Spondylolisthesis at L4-L5 level

## 2023-08-09 ENCOUNTER — HOSPITAL ENCOUNTER (EMERGENCY)
Age: 68
Discharge: HOME | End: 2023-08-09
Attending: EMERGENCY MEDICINE
Payer: MEDICARE

## 2023-08-09 ENCOUNTER — TELEPHONE (OUTPATIENT)
Age: 68
End: 2023-08-09

## 2023-08-09 ENCOUNTER — APPOINTMENT (OUTPATIENT)
Dept: GENERAL RADIOLOGY | Age: 68
End: 2023-08-09
Payer: MEDICARE

## 2023-08-09 VITALS
OXYGEN SATURATION: 97 % | DIASTOLIC BLOOD PRESSURE: 55 MMHG | TEMPERATURE: 98.1 F | HEART RATE: 74 BPM | BODY MASS INDEX: 30.66 KG/M2 | RESPIRATION RATE: 18 BRPM | HEIGHT: 65 IN | SYSTOLIC BLOOD PRESSURE: 115 MMHG | WEIGHT: 184 LBS

## 2023-08-09 DIAGNOSIS — I48.0 PAROXYSMAL ATRIAL FIBRILLATION (HCC): Primary | ICD-10-CM

## 2023-08-09 DIAGNOSIS — R53.83 MALAISE AND FATIGUE: ICD-10-CM

## 2023-08-09 DIAGNOSIS — R53.81 MALAISE AND FATIGUE: ICD-10-CM

## 2023-08-09 LAB
ALBUMIN SERPL-MCNC: 3.6 G/DL (ref 3.2–4.6)
ALBUMIN/GLOB SERPL: 1.1 (ref 0.4–1.6)
ALP SERPL-CCNC: 85 U/L (ref 50–136)
ALT SERPL-CCNC: 24 U/L (ref 12–65)
ANION GAP SERPL CALC-SCNC: 6 MMOL/L (ref 2–11)
APPEARANCE UR: CLEAR
AST SERPL-CCNC: 40 U/L (ref 15–37)
BACTERIA URNS QL MICRO: NEGATIVE /HPF
BASOPHILS # BLD: 0.1 K/UL (ref 0–0.2)
BASOPHILS NFR BLD: 1 % (ref 0–2)
BILIRUB SERPL-MCNC: 0.6 MG/DL (ref 0.2–1.1)
BILIRUB UR QL: NEGATIVE
BUN SERPL-MCNC: 15 MG/DL (ref 8–23)
CALCIUM SERPL-MCNC: 8.7 MG/DL (ref 8.3–10.4)
CASTS URNS QL MICRO: ABNORMAL /LPF
CHLORIDE SERPL-SCNC: 114 MMOL/L (ref 101–110)
CO2 SERPL-SCNC: 23 MMOL/L (ref 21–32)
COLOR UR: ABNORMAL
CREAT SERPL-MCNC: 1.1 MG/DL (ref 0.6–1)
DIFFERENTIAL METHOD BLD: ABNORMAL
EKG DIAGNOSIS: NORMAL
EKG Q-T INTERVAL: 420 MS
EKG QRS DURATION: 88 MS
EKG QTC CALCULATION (BAZETT): 478 MS
EKG R AXIS: -47 DEGREES
EKG T AXIS: 47 DEGREES
EKG VENTRICULAR RATE: 78 BPM
EOSINOPHIL # BLD: 0.2 K/UL (ref 0–0.8)
EOSINOPHIL NFR BLD: 3 % (ref 0.5–7.8)
EPI CELLS #/AREA URNS HPF: ABNORMAL /HPF
ERYTHROCYTE [DISTWIDTH] IN BLOOD BY AUTOMATED COUNT: 13.2 % (ref 11.9–14.6)
GLOBULIN SER CALC-MCNC: 3.3 G/DL (ref 2.8–4.5)
GLUCOSE SERPL-MCNC: 104 MG/DL (ref 65–100)
GLUCOSE UR STRIP.AUTO-MCNC: NEGATIVE MG/DL
HCT VFR BLD AUTO: 37 % (ref 35.8–46.3)
HGB BLD-MCNC: 12.5 G/DL (ref 11.7–15.4)
HGB UR QL STRIP: NEGATIVE
IMM GRANULOCYTES # BLD AUTO: 0 K/UL (ref 0–0.5)
IMM GRANULOCYTES NFR BLD AUTO: 0 % (ref 0–5)
KETONES UR QL STRIP.AUTO: NEGATIVE MG/DL
LACTATE SERPL-SCNC: 1.1 MMOL/L (ref 0.4–2)
LEUKOCYTE ESTERASE UR QL STRIP.AUTO: ABNORMAL
LYMPHOCYTES # BLD: 3.8 K/UL (ref 0.5–4.6)
LYMPHOCYTES NFR BLD: 45 % (ref 13–44)
MAGNESIUM SERPL-MCNC: 1.9 MG/DL (ref 1.8–2.4)
MCH RBC QN AUTO: 31.7 PG (ref 26.1–32.9)
MCHC RBC AUTO-ENTMCNC: 33.8 G/DL (ref 31.4–35)
MCV RBC AUTO: 93.9 FL (ref 82–102)
MONOCYTES # BLD: 0.6 K/UL (ref 0.1–1.3)
MONOCYTES NFR BLD: 7 % (ref 4–12)
NEUTS SEG # BLD: 3.6 K/UL (ref 1.7–8.2)
NEUTS SEG NFR BLD: 44 % (ref 43–78)
NITRITE UR QL STRIP.AUTO: NEGATIVE
NRBC # BLD: 0 K/UL (ref 0–0.2)
NT PRO BNP: 1494 PG/ML (ref 5–125)
PH UR STRIP: 6 (ref 5–9)
PLATELET # BLD AUTO: 209 K/UL (ref 150–450)
PMV BLD AUTO: 11.2 FL (ref 9.4–12.3)
POTASSIUM SERPL-SCNC: 4.1 MMOL/L (ref 3.5–5.1)
PROCALCITONIN SERPL-MCNC: <0.05 NG/ML (ref 0–0.49)
PROT SERPL-MCNC: 6.9 G/DL (ref 6.3–8.2)
PROT UR STRIP-MCNC: NEGATIVE MG/DL
RBC # BLD AUTO: 3.94 M/UL (ref 4.05–5.2)
RBC #/AREA URNS HPF: ABNORMAL /HPF
SARS-COV-2 RDRP RESP QL NAA+PROBE: NOT DETECTED
SODIUM SERPL-SCNC: 143 MMOL/L (ref 133–143)
SOURCE: NORMAL
SP GR UR REFRACTOMETRY: 1.01 (ref 1–1.02)
TROPONIN I SERPL HS-MCNC: 10.1 PG/ML (ref 0–14)
UROBILINOGEN UR QL STRIP.AUTO: 0.2 EU/DL (ref 0.2–1)
WBC # BLD AUTO: 8.4 K/UL (ref 4.3–11.1)
WBC URNS QL MICRO: ABNORMAL /HPF

## 2023-08-09 PROCEDURE — 84145 PROCALCITONIN (PCT): CPT

## 2023-08-09 PROCEDURE — 99285 EMERGENCY DEPT VISIT HI MDM: CPT

## 2023-08-09 PROCEDURE — 85025 COMPLETE CBC W/AUTO DIFF WBC: CPT

## 2023-08-09 PROCEDURE — 87635 SARS-COV-2 COVID-19 AMP PRB: CPT

## 2023-08-09 PROCEDURE — 80053 COMPREHEN METABOLIC PANEL: CPT

## 2023-08-09 PROCEDURE — 81001 URINALYSIS AUTO W/SCOPE: CPT

## 2023-08-09 PROCEDURE — 71046 X-RAY EXAM CHEST 2 VIEWS: CPT

## 2023-08-09 PROCEDURE — 83880 ASSAY OF NATRIURETIC PEPTIDE: CPT

## 2023-08-09 PROCEDURE — 84484 ASSAY OF TROPONIN QUANT: CPT

## 2023-08-09 PROCEDURE — 83605 ASSAY OF LACTIC ACID: CPT

## 2023-08-09 PROCEDURE — 93005 ELECTROCARDIOGRAM TRACING: CPT | Performed by: EMERGENCY MEDICINE

## 2023-08-09 PROCEDURE — 93010 ELECTROCARDIOGRAM REPORT: CPT | Performed by: INTERNAL MEDICINE

## 2023-08-09 PROCEDURE — 83735 ASSAY OF MAGNESIUM: CPT

## 2023-08-09 ASSESSMENT — PAIN SCALES - GENERAL: PAINLEVEL_OUTOF10: 3

## 2023-08-09 ASSESSMENT — PAIN - FUNCTIONAL ASSESSMENT: PAIN_FUNCTIONAL_ASSESSMENT: 0-10

## 2023-08-09 ASSESSMENT — PAIN DESCRIPTION - LOCATION: LOCATION: CHEST

## 2023-08-09 NOTE — ED PROVIDER NOTES
Emergency Department Provider Note       PCP: Mariano Collins MD   Age: 77 y.o. Sex: female     DISPOSITION Decision To Discharge 08/09/2023 05:45:52 PM       ICD-10-CM    1. Paroxysmal atrial fibrillation (HCC)  I48.0       2. Malaise and fatigue  R53.81     R53.83           Medical Decision Making     Complexity of Problems Addressed:  1 or more acute illnesses that pose a threat to life or bodily function. Data Reviewed and Analyzed:   I independently ordered and reviewed each unique test.  I reviewed external records: provider visit note from outside specialist. cardiology      I independently ordered and interpreted the ED EKG in the absence of a Cardiologist.    Rate: 88  EKG Interpretation: EKG Interpretation: atrial fibrillation  ST Segments: Nonspecific ST segments - NO STEMI  I interpreted the X-rays no significant edema. Discussion of management or test interpretation. Patient's work-up is essentially negative. She is not hypoxic and her chest x-ray shows no significant pulmonary edema. Her BNP is mildly elevated but she does not appear to need Lasix at this time. I reviewed her notes and she has had multiple blood pressures below 140s but patient indicates that Dr. Teodoro Mojica was worried about her blood pressure dropping. I discussed her case with Dr. Fred Lopez and relayed that patient feels that her A-fib is causing her to feel like this. Given that she is rate controlled he recommended that she follow-up with Dr. Teodoro Mojica without starting any new medications and does not need admission for cardioversion. The management of this patient was discussed with an external consultant. Risk of Complications and/or Morbidity of Patient Management:  Patient was discharged risks and benefits of hospitalization were considered. Discussion with external consultants. Is this patient to be included in the SEP-1 core measure due to severe sepsis or septic shock?  No Exclusion criteria - the patient

## 2023-08-09 NOTE — ED TRIAGE NOTES
Pt to triage via [place] with CO AF with CP and HA x 5 days. Reports CP and SOB. Denies any recent illness. reports some radiating to left shoulder denies other cardiac hx.  Reports some nausea

## 2023-08-09 NOTE — TELEPHONE ENCOUNTER
In and out of atrial fibrillation for the last two weeks but the last three days it has been continuous and she feels terrible.   Not sure if she should go to the ER

## 2023-08-09 NOTE — TELEPHONE ENCOUNTER
Frequent very bad palpitations with increased SOB, weakness, and tiredness x 2 weeks, constant since 8/4/23. Extremely weak, tired, SOB, and sleepy, today. HR jumping from 71 to 93. Home BP monitor shows a fib. BP-124/68, 115/66, 118/56, 90/54. Woke up in middle of night with much difficulty breathing, even on O2 at 2 liters per minute. Sharp pains lasting only a few seconds every 1-2 hours. Increased urination. Increased swelling in ankles and legs to knees. Gained 3 lbs in 1 week. Feels worse than she felt when she went to ER with a fib on 5/26/23. States she is very scared. Taking Elqiuis 5 mg BID, HCTZ 12.5 mg qd, ramipril 10 mg qd, ASA 81 mg qd, propranolol 40 mg qd, and Lipitor 80 mg qd. Advised patient to go to [facility] ER for immediate evaluation, now. Advised patient that she will be evaluated by ER doctor, who will bring in cardiologist and other specialists as needed. Patient verbalized understanding.

## 2023-09-13 ENCOUNTER — CLINICAL DOCUMENTATION (OUTPATIENT)
Age: 68
End: 2023-09-13

## 2023-09-13 ENCOUNTER — INITIAL CONSULT (OUTPATIENT)
Age: 68
End: 2023-09-13
Payer: MEDICARE

## 2023-09-13 VITALS
HEART RATE: 59 BPM | SYSTOLIC BLOOD PRESSURE: 112 MMHG | BODY MASS INDEX: 30.72 KG/M2 | WEIGHT: 184.4 LBS | DIASTOLIC BLOOD PRESSURE: 68 MMHG | HEIGHT: 65 IN

## 2023-09-13 DIAGNOSIS — I48.0 PAF (PAROXYSMAL ATRIAL FIBRILLATION) (HCC): Primary | ICD-10-CM

## 2023-09-13 DIAGNOSIS — I10 PRIMARY HYPERTENSION: ICD-10-CM

## 2023-09-13 DIAGNOSIS — G45.9 TIA (TRANSIENT ISCHEMIC ATTACK): ICD-10-CM

## 2023-09-13 DIAGNOSIS — I48.0 PAROXYSMAL ATRIAL FIBRILLATION (HCC): ICD-10-CM

## 2023-09-13 PROCEDURE — 3074F SYST BP LT 130 MM HG: CPT | Performed by: INTERNAL MEDICINE

## 2023-09-13 PROCEDURE — 1123F ACP DISCUSS/DSCN MKR DOCD: CPT | Performed by: INTERNAL MEDICINE

## 2023-09-13 PROCEDURE — 3078F DIAST BP <80 MM HG: CPT | Performed by: INTERNAL MEDICINE

## 2023-09-13 PROCEDURE — 99214 OFFICE O/P EST MOD 30 MIN: CPT | Performed by: INTERNAL MEDICINE

## 2023-09-13 PROCEDURE — 93000 ELECTROCARDIOGRAM COMPLETE: CPT | Performed by: INTERNAL MEDICINE

## 2023-09-13 NOTE — PROGRESS NOTES
[address] [facility], [address]  PHONE: [phone]  1955    SUBJECTIVE:   Edwin Ulloa is a 77 y.o. female seen for a follow up visit regarding the following:     Chief Complaint   Patient presents with    Consultation    Atrial Fibrillation       HPI:    Edwin Ulloa is a very pleasant 77 y.o. female with a past medical and cardiac history significant for HTN, GERD, anxiety, TIA, and new diagnosis of pAF and presents for EP follow up for pAF. Pt reports episodes of rapid irregular heart rates lasting typically up to a week or more. Episodes are getting worse, lasting longer. Cardiac PMH: (Old records have been reviewed and summarized below)   - Echo 3/13/21: LVEF >55% , RV normal, Valves: mild MR, mild TR, negative bubble study    - 30 Day Monitor 7/6/21-8/4/21: paroxysmal atrial fibrillation, interval monitor results with Dr Richard Lipscomb revealed atrial fibrillation and she was started on Eliquis, 2% AF     Reviewed office note  7/19/23    Past Medical History, Past Surgical History, Family history, Social History, and Medications were all reviewed with the patient today and updated as necessary.      Current Outpatient Medications   Medication Sig Dispense Refill    ferrous sulfate (FE TABS 325) 325 (65 Fe) MG EC tablet Take 1 tablet by mouth Daily      hydroCHLOROthiazide (HYDRODIURIL) 12.5 MG tablet Take 1 tablet by mouth daily      cyanocobalamin 500 MCG tablet Take 1 tablet by mouth daily      vitamin D (ERGOCALCIFEROL) 1.25 MG (65760 UT) CAPS capsule Take 1 capsule by mouth once a week      ramipril (ALTACE) 10 MG capsule Take 1 capsule by mouth daily 30 capsule 11    apixaban (ELIQUIS) 5 MG TABS tablet Take 1 tablet by mouth 2 times daily 180 tablet 3    aspirin 81 MG chewable tablet Take 1 tablet by mouth daily      atorvastatin (LIPITOR) 80 MG tablet Take 1 tablet by mouth daily      ezetimibe (ZETIA) 10 MG tablet Take 1 tablet by mouth

## 2023-09-13 NOTE — PROGRESS NOTES
Patient alert and oriented X3 upon RN arrival to room. Real AF study discussed in length with patient and her spouse at Dr. Betty Holloway' request. Patient was given time to review the consent. After review, patient was able to verbalize understanding of the study's purpose, design, risks, and benefits. The patient understands that this study is completely voluntary. Financial aspects of the study were reviewed with the patient and she denies any questions at present. Other alternatives were also discussed with patient. After all questions were answered, patient verbalized her desire to be a part of the study. Informed consent was signed prior to starting any study procedure. She was given a copy of her consent, as well as, a copy was placed in her chart. She has no other questions at this time.

## 2023-10-10 NOTE — PROGRESS NOTES
Patient pre-assessment complete for Afib Ablation with Dr. Marline Calle scheduled for 10/16/23 at 10:10am, arrival time 7:30am. Patient verified using . Patient instructed to bring a list of home medications on the day of procedure. NPO status reinforced. Patient instructed to follow EP Information Sheet given at appointment. Patient verbalizes understanding of all instructions & denies any questions at this time.

## 2023-10-15 ENCOUNTER — ANESTHESIA EVENT (OUTPATIENT)
Dept: CARDIAC CATH/INVASIVE PROCEDURES | Age: 68
End: 2023-10-15
Payer: MEDICARE

## 2023-10-16 ENCOUNTER — HOSPITAL ENCOUNTER (OUTPATIENT)
Age: 68
Setting detail: OUTPATIENT SURGERY
Discharge: HOME | End: 2023-10-16
Attending: INTERNAL MEDICINE | Admitting: INTERNAL MEDICINE
Payer: MEDICARE

## 2023-10-16 ENCOUNTER — ANESTHESIA (OUTPATIENT)
Dept: CARDIAC CATH/INVASIVE PROCEDURES | Age: 68
End: 2023-10-16
Payer: MEDICARE

## 2023-10-16 ENCOUNTER — APPOINTMENT (OUTPATIENT)
Dept: CARDIAC CATH/INVASIVE PROCEDURES | Age: 68
End: 2023-10-16
Attending: INTERNAL MEDICINE
Payer: MEDICARE

## 2023-10-16 VITALS
TEMPERATURE: 98.2 F | BODY MASS INDEX: 30.49 KG/M2 | SYSTOLIC BLOOD PRESSURE: 113 MMHG | RESPIRATION RATE: 17 BRPM | WEIGHT: 183 LBS | OXYGEN SATURATION: 96 % | DIASTOLIC BLOOD PRESSURE: 51 MMHG | HEIGHT: 65 IN | HEART RATE: 71 BPM

## 2023-10-16 DIAGNOSIS — I48.0 PAF (PAROXYSMAL ATRIAL FIBRILLATION) (HCC): ICD-10-CM

## 2023-10-16 DIAGNOSIS — I48.91 A-FIB (HCC): ICD-10-CM

## 2023-10-16 LAB
ACT BLD: 293 SECS (ref 70–128)
ANION GAP SERPL CALC-SCNC: 3 MMOL/L (ref 2–11)
BUN SERPL-MCNC: 20 MG/DL (ref 8–23)
CALCIUM SERPL-MCNC: 8.9 MG/DL (ref 8.3–10.4)
CHLORIDE SERPL-SCNC: 110 MMOL/L (ref 101–110)
CO2 SERPL-SCNC: 28 MMOL/L (ref 21–32)
CREAT SERPL-MCNC: 0.9 MG/DL (ref 0.6–1)
ECHO BSA: 1.95 M2
EKG ATRIAL RATE: 64 BPM
EKG ATRIAL RATE: 66 BPM
EKG DIAGNOSIS: NORMAL
EKG DIAGNOSIS: NORMAL
EKG P AXIS: 40 DEGREES
EKG P AXIS: 56 DEGREES
EKG P-R INTERVAL: 146 MS
EKG P-R INTERVAL: 156 MS
EKG Q-T INTERVAL: 434 MS
EKG Q-T INTERVAL: 446 MS
EKG QRS DURATION: 84 MS
EKG QRS DURATION: 92 MS
EKG QTC CALCULATION (BAZETT): 454 MS
EKG QTC CALCULATION (BAZETT): 460 MS
EKG R AXIS: -37 DEGREES
EKG R AXIS: -43 DEGREES
EKG T AXIS: 35 DEGREES
EKG T AXIS: 56 DEGREES
EKG VENTRICULAR RATE: 64 BPM
EKG VENTRICULAR RATE: 66 BPM
ERYTHROCYTE [DISTWIDTH] IN BLOOD BY AUTOMATED COUNT: 13.7 % (ref 11.9–14.6)
GLUCOSE SERPL-MCNC: 93 MG/DL (ref 65–100)
HCT VFR BLD AUTO: 35.9 % (ref 35.8–46.3)
HGB BLD-MCNC: 12 G/DL (ref 11.7–15.4)
MCH RBC QN AUTO: 32.8 PG (ref 26.1–32.9)
MCHC RBC AUTO-ENTMCNC: 33.4 G/DL (ref 31.4–35)
MCV RBC AUTO: 98.1 FL (ref 82–102)
NRBC # BLD: 0 K/UL (ref 0–0.2)
PLATELET # BLD AUTO: 183 K/UL (ref 150–450)
PMV BLD AUTO: 10.3 FL (ref 9.4–12.3)
POTASSIUM SERPL-SCNC: 4.1 MMOL/L (ref 3.5–5.1)
RBC # BLD AUTO: 3.66 M/UL (ref 4.05–5.2)
SODIUM SERPL-SCNC: 141 MMOL/L (ref 133–143)
WBC # BLD AUTO: 9.6 K/UL (ref 4.3–11.1)

## 2023-10-16 PROCEDURE — 2709999900 HC NON-CHARGEABLE SUPPLY: Performed by: INTERNAL MEDICINE

## 2023-10-16 PROCEDURE — 93656 COMPRE EP EVAL ABLTJ ATR FIB: CPT | Performed by: INTERNAL MEDICINE

## 2023-10-16 PROCEDURE — 80048 BASIC METABOLIC PNL TOTAL CA: CPT

## 2023-10-16 PROCEDURE — 93657 TX L/R ATRIAL FIB ADDL: CPT | Performed by: INTERNAL MEDICINE

## 2023-10-16 PROCEDURE — C1894 INTRO/SHEATH, NON-LASER: HCPCS | Performed by: INTERNAL MEDICINE

## 2023-10-16 PROCEDURE — 93622 COMP EP EVAL L VENTR PAC&REC: CPT | Performed by: INTERNAL MEDICINE

## 2023-10-16 PROCEDURE — 93655 ICAR CATH ABLTJ DSCRT ARRHYT: CPT | Performed by: INTERNAL MEDICINE

## 2023-10-16 PROCEDURE — C1759 CATH, INTRA ECHOCARDIOGRAPHY: HCPCS | Performed by: INTERNAL MEDICINE

## 2023-10-16 PROCEDURE — 93010 ELECTROCARDIOGRAM REPORT: CPT | Performed by: INTERNAL MEDICINE

## 2023-10-16 PROCEDURE — 6370000000 HC RX 637 (ALT 250 FOR IP): Performed by: ANESTHESIOLOGY

## 2023-10-16 PROCEDURE — 6360000002 HC RX W HCPCS: Performed by: ANESTHESIOLOGY

## 2023-10-16 PROCEDURE — 85347 COAGULATION TIME ACTIVATED: CPT

## 2023-10-16 PROCEDURE — 7100000001 HC PACU RECOVERY - ADDTL 15 MIN: Performed by: INTERNAL MEDICINE

## 2023-10-16 PROCEDURE — 85027 COMPLETE CBC AUTOMATED: CPT

## 2023-10-16 PROCEDURE — 93005 ELECTROCARDIOGRAM TRACING: CPT | Performed by: INTERNAL MEDICINE

## 2023-10-16 PROCEDURE — C1760 CLOSURE DEV, VASC: HCPCS | Performed by: INTERNAL MEDICINE

## 2023-10-16 PROCEDURE — 3700000001 HC ADD 15 MINUTES (ANESTHESIA): Performed by: INTERNAL MEDICINE

## 2023-10-16 PROCEDURE — 6360000002 HC RX W HCPCS: Performed by: NURSE ANESTHETIST, CERTIFIED REGISTERED

## 2023-10-16 PROCEDURE — 93623 PRGRMD STIMJ&PACG IV RX NFS: CPT | Performed by: INTERNAL MEDICINE

## 2023-10-16 PROCEDURE — C1893 INTRO/SHEATH, FIXED,NON-PEEL: HCPCS | Performed by: INTERNAL MEDICINE

## 2023-10-16 PROCEDURE — C1732 CATH, EP, DIAG/ABL, 3D/VECT: HCPCS | Performed by: INTERNAL MEDICINE

## 2023-10-16 PROCEDURE — 93325 DOPPLER ECHO COLOR FLOW MAPG: CPT

## 2023-10-16 PROCEDURE — 2720000010 HC SURG SUPPLY STERILE: Performed by: INTERNAL MEDICINE

## 2023-10-16 PROCEDURE — 3700000000 HC ANESTHESIA ATTENDED CARE: Performed by: INTERNAL MEDICINE

## 2023-10-16 PROCEDURE — 2500000003 HC RX 250 WO HCPCS: Performed by: NURSE ANESTHETIST, CERTIFIED REGISTERED

## 2023-10-16 PROCEDURE — 2580000003 HC RX 258: Performed by: ANESTHESIOLOGY

## 2023-10-16 PROCEDURE — C1730 CATH, EP, 19 OR FEW ELECT: HCPCS | Performed by: INTERNAL MEDICINE

## 2023-10-16 PROCEDURE — 6360000002 HC RX W HCPCS: Performed by: INTERNAL MEDICINE

## 2023-10-16 PROCEDURE — 7100000000 HC PACU RECOVERY - FIRST 15 MIN: Performed by: INTERNAL MEDICINE

## 2023-10-16 RX ORDER — PROPOFOL 10 MG/ML
INJECTION, EMULSION INTRAVENOUS PRN
Status: DISCONTINUED | OUTPATIENT
Start: 2023-10-16 | End: 2023-10-16 | Stop reason: SDUPTHER

## 2023-10-16 RX ORDER — OXYCODONE HYDROCHLORIDE 5 MG/1
5 TABLET ORAL
Status: COMPLETED | OUTPATIENT
Start: 2023-10-16 | End: 2023-10-16

## 2023-10-16 RX ORDER — SODIUM CHLORIDE 0.9 % (FLUSH) 0.9 %
5-40 SYRINGE (ML) INJECTION EVERY 12 HOURS SCHEDULED
Status: DISCONTINUED | OUTPATIENT
Start: 2023-10-16 | End: 2023-10-16 | Stop reason: HOSPADM

## 2023-10-16 RX ORDER — SODIUM CHLORIDE 9 MG/ML
INJECTION, SOLUTION INTRAVENOUS PRN
Status: DISCONTINUED | OUTPATIENT
Start: 2023-10-16 | End: 2023-10-16 | Stop reason: HOSPADM

## 2023-10-16 RX ORDER — DEXAMETHASONE SODIUM PHOSPHATE 4 MG/ML
INJECTION, SOLUTION INTRA-ARTICULAR; INTRALESIONAL; INTRAMUSCULAR; INTRAVENOUS; SOFT TISSUE PRN
Status: DISCONTINUED | OUTPATIENT
Start: 2023-10-16 | End: 2023-10-16 | Stop reason: SDUPTHER

## 2023-10-16 RX ORDER — PROTAMINE SULFATE 10 MG/ML
INJECTION, SOLUTION INTRAVENOUS PRN
Status: DISCONTINUED | OUTPATIENT
Start: 2023-10-16 | End: 2023-10-16 | Stop reason: SDUPTHER

## 2023-10-16 RX ORDER — SODIUM CHLORIDE, SODIUM LACTATE, POTASSIUM CHLORIDE, CALCIUM CHLORIDE 600; 310; 30; 20 MG/100ML; MG/100ML; MG/100ML; MG/100ML
INJECTION, SOLUTION INTRAVENOUS CONTINUOUS
Status: DISCONTINUED | OUTPATIENT
Start: 2023-10-16 | End: 2023-10-16 | Stop reason: HOSPADM

## 2023-10-16 RX ORDER — HEPARIN SODIUM 200 [USP'U]/100ML
INJECTION, SOLUTION INTRAVENOUS CONTINUOUS PRN
Status: COMPLETED | OUTPATIENT
Start: 2023-10-16 | End: 2023-10-16

## 2023-10-16 RX ORDER — LIDOCAINE HYDROCHLORIDE 20 MG/ML
INJECTION, SOLUTION EPIDURAL; INFILTRATION; INTRACAUDAL; PERINEURAL PRN
Status: DISCONTINUED | OUTPATIENT
Start: 2023-10-16 | End: 2023-10-16 | Stop reason: SDUPTHER

## 2023-10-16 RX ORDER — SODIUM CHLORIDE 0.9 % (FLUSH) 0.9 %
5-40 SYRINGE (ML) INJECTION PRN
Status: DISCONTINUED | OUTPATIENT
Start: 2023-10-16 | End: 2023-10-16 | Stop reason: HOSPADM

## 2023-10-16 RX ORDER — ONDANSETRON 2 MG/ML
INJECTION INTRAMUSCULAR; INTRAVENOUS PRN
Status: DISCONTINUED | OUTPATIENT
Start: 2023-10-16 | End: 2023-10-16 | Stop reason: SDUPTHER

## 2023-10-16 RX ORDER — HEPARIN SODIUM 1000 [USP'U]/ML
INJECTION, SOLUTION INTRAVENOUS; SUBCUTANEOUS PRN
Status: DISCONTINUED | OUTPATIENT
Start: 2023-10-16 | End: 2023-10-16 | Stop reason: SDUPTHER

## 2023-10-16 RX ORDER — DEXTROSE MONOHYDRATE 100 MG/ML
INJECTION, SOLUTION INTRAVENOUS CONTINUOUS PRN
Status: DISCONTINUED | OUTPATIENT
Start: 2023-10-16 | End: 2023-10-16 | Stop reason: HOSPADM

## 2023-10-16 RX ORDER — MIDAZOLAM HYDROCHLORIDE 2 MG/2ML
2 INJECTION, SOLUTION INTRAMUSCULAR; INTRAVENOUS
Status: DISCONTINUED | OUTPATIENT
Start: 2023-10-16 | End: 2023-10-16 | Stop reason: HOSPADM

## 2023-10-16 RX ORDER — ROCURONIUM BROMIDE 10 MG/ML
INJECTION, SOLUTION INTRAVENOUS PRN
Status: DISCONTINUED | OUTPATIENT
Start: 2023-10-16 | End: 2023-10-16 | Stop reason: SDUPTHER

## 2023-10-16 RX ORDER — COLCHICINE 0.6 MG/1
0.6 TABLET ORAL DAILY
Qty: 30 TABLET | Refills: 0 | Status: SHIPPED | OUTPATIENT
Start: 2023-10-16

## 2023-10-16 RX ORDER — HEPARIN SODIUM AND DEXTROSE 5000; 5 [USP'U]/100ML; G/100ML
INJECTION INTRAVENOUS CONTINUOUS PRN
Status: DISCONTINUED | OUTPATIENT
Start: 2023-10-16 | End: 2023-10-16 | Stop reason: SDUPTHER

## 2023-10-16 RX ORDER — DIPHENHYDRAMINE HYDROCHLORIDE 50 MG/ML
12.5 INJECTION INTRAMUSCULAR; INTRAVENOUS
Status: DISCONTINUED | OUTPATIENT
Start: 2023-10-16 | End: 2023-10-16 | Stop reason: HOSPADM

## 2023-10-16 RX ORDER — PANTOPRAZOLE SODIUM 40 MG/1
40 TABLET, DELAYED RELEASE ORAL
Qty: 60 TABLET | Refills: 0 | Status: SHIPPED | OUTPATIENT
Start: 2023-10-16

## 2023-10-16 RX ORDER — SUCRALFATE 1 G/1
1 TABLET ORAL 4 TIMES DAILY
Qty: 120 TABLET | Refills: 0 | Status: SHIPPED | OUTPATIENT
Start: 2023-10-16

## 2023-10-16 RX ORDER — PROCHLORPERAZINE EDISYLATE 5 MG/ML
5 INJECTION INTRAMUSCULAR; INTRAVENOUS
Status: DISCONTINUED | OUTPATIENT
Start: 2023-10-16 | End: 2023-10-16 | Stop reason: HOSPADM

## 2023-10-16 RX ORDER — LIDOCAINE HYDROCHLORIDE 10 MG/ML
1 INJECTION, SOLUTION INFILTRATION; PERINEURAL
Status: DISCONTINUED | OUTPATIENT
Start: 2023-10-16 | End: 2023-10-16 | Stop reason: HOSPADM

## 2023-10-16 RX ORDER — HYDROMORPHONE HYDROCHLORIDE 2 MG/ML
0.5 INJECTION, SOLUTION INTRAMUSCULAR; INTRAVENOUS; SUBCUTANEOUS EVERY 10 MIN PRN
Status: DISCONTINUED | OUTPATIENT
Start: 2023-10-16 | End: 2023-10-16 | Stop reason: HOSPADM

## 2023-10-16 RX ORDER — ADENOSINE 3 MG/ML
INJECTION, SOLUTION INTRAVENOUS PRN
Status: DISCONTINUED | OUTPATIENT
Start: 2023-10-16 | End: 2023-10-16 | Stop reason: HOSPADM

## 2023-10-16 RX ADMIN — PROTAMINE SULFATE 20 MG: 10 INJECTION, SOLUTION INTRAVENOUS at 11:21

## 2023-10-16 RX ADMIN — HEPARIN SODIUM 5500 UNITS: 1000 INJECTION INTRAVENOUS; SUBCUTANEOUS at 10:32

## 2023-10-16 RX ADMIN — PROTAMINE SULFATE 20 MG: 10 INJECTION, SOLUTION INTRAVENOUS at 11:17

## 2023-10-16 RX ADMIN — LIDOCAINE HYDROCHLORIDE 100 MG: 20 INJECTION, SOLUTION EPIDURAL; INFILTRATION; INTRACAUDAL; PERINEURAL at 10:01

## 2023-10-16 RX ADMIN — PROTAMINE SULFATE 20 MG: 10 INJECTION, SOLUTION INTRAVENOUS at 11:19

## 2023-10-16 RX ADMIN — HYDROMORPHONE HYDROCHLORIDE 0.5 MG: 2 INJECTION, SOLUTION INTRAMUSCULAR; INTRAVENOUS; SUBCUTANEOUS at 12:08

## 2023-10-16 RX ADMIN — PROTAMINE SULFATE 20 MG: 10 INJECTION, SOLUTION INTRAVENOUS at 11:18

## 2023-10-16 RX ADMIN — HEPARIN SODIUM 5500 UNITS: 1000 INJECTION INTRAVENOUS; SUBCUTANEOUS at 10:22

## 2023-10-16 RX ADMIN — SUGAMMADEX 200 MG: 100 INJECTION, SOLUTION INTRAVENOUS at 11:18

## 2023-10-16 RX ADMIN — OXYCODONE HYDROCHLORIDE 5 MG: 5 TABLET ORAL at 12:08

## 2023-10-16 RX ADMIN — HEPARIN SODIUM AND DEXTROSE 40 UNITS/KG/HR: 5000; 5 INJECTION INTRAVENOUS at 10:32

## 2023-10-16 RX ADMIN — ROCURONIUM BROMIDE 40 MG: 50 INJECTION, SOLUTION INTRAVENOUS at 10:01

## 2023-10-16 RX ADMIN — PROPOFOL 200 MG: 10 INJECTION, EMULSION INTRAVENOUS at 10:01

## 2023-10-16 RX ADMIN — PROTAMINE SULFATE 20 MG: 10 INJECTION, SOLUTION INTRAVENOUS at 11:20

## 2023-10-16 RX ADMIN — ONDANSETRON 4 MG: 2 INJECTION INTRAMUSCULAR; INTRAVENOUS at 11:15

## 2023-10-16 RX ADMIN — SODIUM CHLORIDE, SODIUM LACTATE, POTASSIUM CHLORIDE, AND CALCIUM CHLORIDE: 600; 310; 30; 20 INJECTION, SOLUTION INTRAVENOUS at 09:54

## 2023-10-16 RX ADMIN — DEXAMETHASONE SODIUM PHOSPHATE 4 MG: 4 INJECTION, SOLUTION INTRAMUSCULAR; INTRAVENOUS at 11:15

## 2023-10-16 ASSESSMENT — PAIN - FUNCTIONAL ASSESSMENT
PAIN_FUNCTIONAL_ASSESSMENT: NONE - DENIES PAIN
PAIN_FUNCTIONAL_ASSESSMENT: NONE - DENIES PAIN
PAIN_FUNCTIONAL_ASSESSMENT: 0-10

## 2023-10-16 ASSESSMENT — PAIN DESCRIPTION - ORIENTATION: ORIENTATION: ANTERIOR

## 2023-10-16 ASSESSMENT — PAIN DESCRIPTION - DESCRIPTORS: DESCRIPTORS: ACHING

## 2023-10-16 ASSESSMENT — PAIN DESCRIPTION - LOCATION: LOCATION: HEAD

## 2023-10-16 ASSESSMENT — PAIN SCALES - GENERAL: PAINLEVEL_OUTOF10: 7

## 2023-10-16 NOTE — PERIOP NOTE
TRANSFER - OUT REPORT:    Verbal report given to Markel Church on [address] ([facility])  being transferred to [facility] for routine post-op       Report consisted of patient's Situation, Background, Assessment and   Recommendations(SBAR). Information from the following report(s) Adult Overview, Surgery Report, Intake/Output, and MAR was reviewed with the receiving nurse. Lines:   Peripheral IV 10/16/23 Right Antecubital (Active)   Site Assessment Clean, dry & intact 10/16/23 1312   Line Status Infusing 10/16/23 1136   Line Care Connections checked and tightened 10/16/23 1136   Phlebitis Assessment No symptoms 10/16/23 1312   Infiltration Assessment 0 10/16/23 1136   Alcohol Cap Used No 10/16/23 1136   Dressing Status Clean, dry & intact 10/16/23 1312   Dressing Type Transparent 10/16/23 1312       Peripheral IV 10/16/23 Left;Posterior Hand (Active)   Site Assessment Clean, dry & intact 10/16/23 1312   Line Status Infusing 10/16/23 [place] Connections checked and tightened 10/16/23 1136   Phlebitis Assessment No symptoms 10/16/23 1312   Infiltration Assessment 0 10/16/23 1136   Alcohol Cap Used No 10/16/23 1136   Dressing Status Clean, dry & intact 10/16/23 1312   Dressing Type Transparent 10/16/23 1312        Opportunity for questions and clarification was provided.       Patient transported with:  Registered Nurse

## 2023-10-16 NOTE — PROGRESS NOTES
Called to room by pt. States she was changing clothes and had 10/10 chest pain. Pt placed back on monitor and 12 lead ekg obtained.   Dr. Diana Rosado notified

## 2023-10-16 NOTE — DISCHARGE INSTRUCTIONS
Atrial Fib Ablation Discharge Instructions    1 - Check the puncture site frequently for swelling or bleeding. If you see any bleeding, lie down and apply pressure over the area with a clean town or washcloth. Notify your doctor for any redness, swelling, drainage or oozing from the puncture site. Notify your doctor for any fever or chills. 2 - If the leg with the puncture becomes cold, numb or painful, call [facility] at  [phone].    3 - Activity should be limited for the next 48 hours. Climb stairs as little as possible and avoid any stooping, bending or strenuous activity for 48 hours. No heavy lifting (anything over 10 pounds) for three days. 4 - Do not drive for the first 24 hours post ablation. 5 - You may resume your usual diet. Drink more fluids than usual.    6 - Have a responsible person drive you home and stay with you for at least 24 hours after your ablation. 7 -You may remove the bandage from your Right & Left Groin in 24 hours. You may shower in 24 hours. No tub baths, hot tubs or swimming for one week. Do not place any lotions, creams, powders, ointments over the puncture site for one week. You may place a clean band-aid over the puncture site each day for 5 days. Change this daily. 8 - Continue medicines for now including your blood thinner eliquis, and your rhythm medicine propanolol. These medicines should be continued until EP follow up. 9 - You will need to start Carafate, Colchicine and Protonix for one month. This is to protect your esophagus from a possible injury during the ablation procedure. 10 - If chest pain occurs (especially when taking a deep breath), it is likely due to pericarditis or inflammation around your heart sac which is related to the ablation procedure. It usually responds to ibuprofen at 400-600 mg every 6-8 hours as needed.  If feels severe or unrelieved, please call office to discuss symptoms with

## 2023-10-16 NOTE — PROGRESS NOTES
Patient received to [facility] room # 9  Ambulatory from [facility]. Patient scheduled for afib ablation today with Dr Ila Whitten. Procedure reviewed & questions answered, voiced good understanding consent obtained & placed on chart. All medications and medical history reviewed. Will prep patient per orders. Patient & family updated on plan of care. The patient has a fraility score of 2-WELL, based on ambulating independently.

## 2023-10-16 NOTE — ANESTHESIA PRE PROCEDURE
Department of Anesthesiology  Preprocedure Note       Name:  Becki Romero Masters   Age:  77 y.o.  :  1955                                          MRN:  [id number]         Date:  10/16/2023      Surgeon: Phuong Zelaya):  Mena Pickens MD    Procedure: Procedure(s):  Ablation A-fib w complete ep study    Medications prior to admission:   Prior to Admission medications    Medication Sig Start Date End Date Taking? Authorizing Provider   apixaban (ELIQUIS) 5 MG TABS tablet Take 1 tablet by mouth 2 times daily 23   Mena Pickens MD   ferrous sulfate (FE TABS 325) 325 (65 Fe) MG EC tablet Take 1 tablet by mouth Daily    Rene Valente MD   hydroCHLOROthiazide (HYDRODIURIL) 12.5 MG tablet Take 1 tablet by mouth daily    Rene Valente MD   cyanocobalamin 500 MCG tablet Take 1 tablet by mouth daily 3/4/19   Rene Valente MD   vitamin D (ERGOCALCIFEROL) 1.25 MG (52616 UT) CAPS capsule Take 1 capsule by mouth once a week    Rene Valente MD   ramipril (ALTACE) 10 MG capsule Take 1 capsule by mouth daily 23   ePter Cowart DO   aspirin 81 MG chewable tablet Take 1 tablet by mouth daily 21   Automatic Reconciliation, Ar   atorvastatin (LIPITOR) 80 MG tablet Take 1 tablet by mouth daily 21   Automatic Reconciliation, Ar   ezetimibe (ZETIA) 10 MG tablet Take 1 tablet by mouth daily 21   Automatic Reconciliation, Ar   FLUoxetine (PROZAC) 40 MG capsule Take 1 capsule by mouth daily    Automatic Reconciliation, Ar   gabapentin (NEURONTIN) 300 MG capsule Take 1 capsule by mouth 2 times daily.     Automatic Reconciliation, Ar   omeprazole (PRILOSEC) 40 MG delayed release capsule Take 1 capsule by mouth daily 21   Automatic Reconciliation, Ar   propranolol (INDERAL) 40 MG tablet Take 1 tablet by mouth daily 21   Automatic Reconciliation, Ar       Current medications:    Current Facility-Administered Medications   Medication Dose Route Frequency Provider

## 2023-10-16 NOTE — ANESTHESIA POSTPROCEDURE EVALUATION
Department of Anesthesiology  Postprocedure Note    Patient: Antonio Valentin  MRN: [id number]  [address] [facility] 1955  Date of evaluation: 10/16/2023      Procedure Summary     Date: 10/16/23 Room / Location: D EP 2 ALL EVENTS / [facility] CARDIAC CATH LAB    Anesthesia Start: 5373 Anesthesia Stop: 1137    Procedure: Ablation A-fib w complete ep study Diagnosis:       PAF (paroxysmal atrial fibrillation) (HCC)      (PAF (paroxysmal atrial fibrillation) ([address]) [I48.0])    Providers: Rojelio Krueger MD Responsible Provider: Binta Dye MD    Anesthesia Type: general ASA Status: 3          Anesthesia Type: No value filed. Devaughn Phase I: Devaughn Score: 8    Devauhgn Phase II:        Anesthesia Post Evaluation    Patient location during evaluation: PACU  Patient participation: complete - patient participated  Level of consciousness: awake and alert  Airway patency: patent  Nausea: well controlled. Complications: no  Cardiovascular status: acceptable.   Respiratory status: acceptable  Hydration status: stable  Pain management: adequate

## 2023-10-16 NOTE — H&P
[facility] Cardiology/Electrophyiology Consult                Date of  Admission: No admission date for patient encounter. CC/Reason for admission: afib ablation     Jose Toure is a 77 y.o. female admitted for PAF (paroxysmal atrial fibrillation) ([facility]) [I48.0]. 77 y.o. female with a past medical and cardiac history significant for HTN, GERD, anxiety, TIA, and new diagnosis of pAF and presents for scheduled ablation for pAF. Pt reports episodes of rapid irregular heart rates lasting typically up to a week or more. Episodes are getting worse, lasting longer. Cardiac PMH: (Old records have been reviewed and summarized below)    Patient Active Problem List   Diagnosis    Left upper extremity numbness    Hyperlipidemia    Tremor    Anxiety    Lumbar stenosis with neurogenic claudication    HTN (hypertension)    Slurred speech    Left leg numbness    Spondylolisthesis at L4-L5 level    TIA (transient ischemic attack)    HNP (herniated nucleus pulposus), lumbar    PAF (paroxysmal atrial fibrillation) (HCC)       Past Medical History:   Diagnosis Date    Anxiety     Depression     GERD (gastroesophageal reflux disease)     HTN (hypertension)     Insomnia     Panic attack     Tremors of nervous system       Past Surgical History:   Procedure Laterality Date    APPENDECTOMY      CARPAL TUNNEL RELEASE      bilateral    LITHOTRIPSY      ORTHOPEDIC SURGERY      lower back    OTHER SURGICAL HISTORY      partial bowel removal as child    TUBAL LIGATION       Allergies   Allergen Reactions    Zolpidem Other (See Comments)     hallucinations      No family history on file. No current facility-administered medications for this encounter.      Current Outpatient Medications   Medication Sig    apixaban (ELIQUIS) 5 MG TABS tablet Take 1 tablet by mouth 2 times daily    ferrous sulfate (FE TABS 325) 325 (65 Fe) MG EC tablet Take 1 tablet by mouth Daily    hydroCHLOROthiazide (HYDRODIURIL) 12.5 MG tablet Take 1

## 2023-10-16 NOTE — PROGRESS NOTES
Assisted OOB & ambulated to BR & on unit. Tolerated activity without difficulty.  Bilat groin without bleeding or hematoma

## 2023-10-17 LAB — ECHO BSA: 1.95 M2

## 2023-10-17 PROCEDURE — 93320 DOPPLER ECHO COMPLETE: CPT | Performed by: INTERNAL MEDICINE

## 2023-10-17 PROCEDURE — 93312 ECHO TRANSESOPHAGEAL: CPT | Performed by: INTERNAL MEDICINE

## 2023-10-17 PROCEDURE — 93325 DOPPLER ECHO COLOR FLOW MAPG: CPT | Performed by: INTERNAL MEDICINE

## 2023-10-26 ENCOUNTER — OFFICE VISIT (OUTPATIENT)
Age: 68
End: 2023-10-26
Payer: MEDICARE

## 2023-10-26 VITALS
HEART RATE: 68 BPM | WEIGHT: 181 LBS | BODY MASS INDEX: 30.16 KG/M2 | HEIGHT: 65 IN | SYSTOLIC BLOOD PRESSURE: 138 MMHG | DIASTOLIC BLOOD PRESSURE: 78 MMHG

## 2023-10-26 DIAGNOSIS — Z98.890 H/O CARDIAC RADIOFREQUENCY ABLATION: ICD-10-CM

## 2023-10-26 DIAGNOSIS — I48.0 PAROXYSMAL ATRIAL FIBRILLATION (HCC): ICD-10-CM

## 2023-10-26 DIAGNOSIS — I48.0 PAF (PAROXYSMAL ATRIAL FIBRILLATION) (HCC): Primary | ICD-10-CM

## 2023-10-26 DIAGNOSIS — E78.2 MIXED HYPERLIPIDEMIA: ICD-10-CM

## 2023-10-26 DIAGNOSIS — G45.9 TIA (TRANSIENT ISCHEMIC ATTACK): ICD-10-CM

## 2023-10-26 DIAGNOSIS — I10 ESSENTIAL HYPERTENSION: ICD-10-CM

## 2023-10-26 PROCEDURE — 3078F DIAST BP <80 MM HG: CPT | Performed by: INTERNAL MEDICINE

## 2023-10-26 PROCEDURE — 3075F SYST BP GE 130 - 139MM HG: CPT | Performed by: INTERNAL MEDICINE

## 2023-10-26 PROCEDURE — 1123F ACP DISCUSS/DSCN MKR DOCD: CPT | Performed by: INTERNAL MEDICINE

## 2023-10-26 PROCEDURE — 99213 OFFICE O/P EST LOW 20 MIN: CPT | Performed by: INTERNAL MEDICINE

## 2023-10-26 PROCEDURE — 93000 ELECTROCARDIOGRAM COMPLETE: CPT | Performed by: INTERNAL MEDICINE

## 2023-10-26 RX ORDER — OMEPRAZOLE 40 MG/1
40 CAPSULE, DELAYED RELEASE ORAL DAILY
COMMUNITY

## 2023-10-26 ASSESSMENT — ENCOUNTER SYMPTOMS
RESPIRATORY NEGATIVE: 1
SHORTNESS OF BREATH: 0
COUGH: 0

## 2023-10-26 NOTE — PROGRESS NOTES
Status: She is alert and oriented to person, place, and time. Psychiatric:         Mood and Affect: Mood normal.         Thought Content: Thought content normal.         Judgment: Judgment normal.         Medical problems, medical history, and test results were reviewed with the patient today. No results found for this or any previous visit (from the past 168 hour(s)). Current Outpatient Medications:     apixaban (ELIQUIS) 5 MG TABS tablet, Take 1 tablet by mouth 2 times daily, Disp: 14 tablet, Rfl: 0    atorvastatin (LIPITOR) 80 MG tablet, Take 1 tablet by mouth daily, Disp: , Rfl:     ezetimibe (ZETIA) 10 MG tablet, Take 1 tablet by mouth daily, Disp: , Rfl:     pantoprazole (PROTONIX) 40 MG tablet, Take 1 tablet by mouth 2 times daily (before meals), Disp: 60 tablet, Rfl: 0    sucralfate (CARAFATE) 1 GM tablet, Take 1 tablet by mouth 4 times daily, Disp: 120 tablet, Rfl: 0    colchicine (COLCRYS) 0.6 MG tablet, Take 1 tablet by mouth daily, Disp: 30 tablet, Rfl: 0    ferrous sulfate (FE TABS 325) 325 (65 Fe) MG EC tablet, Take 1 tablet by mouth Daily, Disp: , Rfl:     hydroCHLOROthiazide (HYDRODIURIL) 12.5 MG tablet, Take 1 tablet by mouth daily, Disp: , Rfl:     cyanocobalamin 500 MCG tablet, Take 1 tablet by mouth daily, Disp: , Rfl:     vitamin D (ERGOCALCIFEROL) 1.25 MG (11632 UT) CAPS capsule, Take 1 capsule by mouth once a week, Disp: , Rfl:     ramipril (ALTACE) 10 MG capsule, Take 1 capsule by mouth daily, Disp: 30 capsule, Rfl: 11    aspirin 81 MG chewable tablet, Take 1 tablet by mouth daily, Disp: , Rfl:     FLUoxetine (PROZAC) 40 MG capsule, Take 1 capsule by mouth daily, Disp: , Rfl:     gabapentin (NEURONTIN) 300 MG capsule, Take 1 capsule by mouth 2 times daily. , Disp: , Rfl:     propranolol (INDERAL) 40 MG tablet, Take 1 tablet by mouth daily, Disp: , Rfl:      ASSESSMENT/PLAN:    Cardiovascular Testing:  - FARZANEH 10/17/2023: LVEF greater than 55%, normal wall motion, RV normal, trace MR,

## 2023-11-21 RX ORDER — SUCRALFATE 1 G/1
1 TABLET ORAL 4 TIMES DAILY
Qty: 360 TABLET | OUTPATIENT
Start: 2023-11-21

## 2023-11-21 NOTE — TELEPHONE ENCOUNTER
Requested Prescriptions     Refused Prescriptions Disp Refills    sucralfate (CARAFATE) 1 GM tablet [Pharmacy Med Name: SUCRALFATE 1GM TABLETS] 360 tablet      Sig: TAKE 1 TABLET BY MOUTH FOUR TIMES DAILY     Refused By: Mervat Price     Reason for Refusal: Refill not appropriate

## 2024-01-03 ENCOUNTER — OFFICE VISIT (OUTPATIENT)
Age: 69
End: 2024-01-03
Payer: MEDICARE

## 2024-01-03 VITALS
WEIGHT: 186 LBS | BODY MASS INDEX: 30.99 KG/M2 | DIASTOLIC BLOOD PRESSURE: 80 MMHG | HEART RATE: 72 BPM | HEIGHT: 65 IN | SYSTOLIC BLOOD PRESSURE: 138 MMHG

## 2024-01-03 DIAGNOSIS — I48.0 PAF (PAROXYSMAL ATRIAL FIBRILLATION) (HCC): Primary | ICD-10-CM

## 2024-01-03 PROCEDURE — 99213 OFFICE O/P EST LOW 20 MIN: CPT | Performed by: INTERNAL MEDICINE

## 2024-01-03 PROCEDURE — 3075F SYST BP GE 130 - 139MM HG: CPT | Performed by: INTERNAL MEDICINE

## 2024-01-03 PROCEDURE — 3079F DIAST BP 80-89 MM HG: CPT | Performed by: INTERNAL MEDICINE

## 2024-01-03 PROCEDURE — 1123F ACP DISCUSS/DSCN MKR DOCD: CPT | Performed by: INTERNAL MEDICINE

## 2024-01-03 RX ORDER — PREDNISONE 20 MG/1
20 TABLET ORAL 2 TIMES DAILY
COMMUNITY
Start: 2023-12-31

## 2024-01-03 RX ORDER — DAPAGLIFLOZIN 10 MG/1
TABLET, FILM COATED ORAL
COMMUNITY
Start: 2023-10-20

## 2024-01-03 RX ORDER — LEVOFLOXACIN 750 MG/1
750 TABLET, FILM COATED ORAL DAILY
COMMUNITY
Start: 2023-12-31

## 2024-01-03 NOTE — PROGRESS NOTES
[facility] CARDIOLOGY, [address], [address]  [place] [address]  PHONE: [phone]  1955    SUBJECTIVE:   Chaya Miranda is a 68 y.o. female seen for a follow up visit regarding the following:     Chief Complaint   Patient presents with    Atrial Fibrillation     10-12 wks post afib abl       HPI:    Chaya Miranda is a very pleasant 68 y.o. female with a past medical and cardiac history significant for HTN, GERD, anxiety, TIA, and new diagnosis of pAF s/p ablation. She is doing well, some early palpitations, improving.    Cardiac PMH: (Old records have been reviewed and summarized below)   - Echo 3/13/21: LVEF >55% , RV normal, Valves: mild MR, mild TR, negative bubble study    - 30 Day Monitor 7/6/21-8/4/21: paroxysmal atrial fibrillation, interval monitor results with Dr Madden revealed atrial fibrillation and she was started on Eliquis, 2% AF     Reviewed office note  7/19/23    Past Medical History, Past Surgical History, Family history, Social History, and Medications were all reviewed with the patient today and updated as necessary.     Current Outpatient Medications   Medication Sig Dispense Refill    omeprazole (PRILOSEC) 40 MG delayed release capsule Take 1 capsule by mouth daily      apixaban (ELIQUIS) 5 MG TABS tablet Take 1 tablet by mouth 2 times daily 180 tablet 3    pantoprazole (PROTONIX) 40 MG tablet Take 1 tablet by mouth 2 times daily (before meals) 60 tablet 0    sucralfate (CARAFATE) 1 GM tablet Take 1 tablet by mouth 4 times daily 120 tablet 0    colchicine (COLCRYS) 0.6 MG tablet Take 1 tablet by mouth daily 30 tablet 0    ferrous sulfate (FE TABS 325) 325 (65 Fe) MG EC tablet Take 1 tablet by mouth Daily      hydroCHLOROthiazide (HYDRODIURIL) 12.5 MG tablet Take 1 tablet by mouth daily      cyanocobalamin 500 MCG tablet Take 1 tablet by mouth daily      vitamin D (ERGOCALCIFEROL) 1.25 MG (14918 UT) CAPS capsule Take 1 capsule by mouth once a week

## 2024-03-11 ENCOUNTER — OFFICE VISIT (OUTPATIENT)
Age: 69
End: 2024-03-11
Payer: MEDICARE

## 2024-03-11 VITALS
BODY MASS INDEX: 29.32 KG/M2 | HEART RATE: 80 BPM | WEIGHT: 176 LBS | SYSTOLIC BLOOD PRESSURE: 118 MMHG | DIASTOLIC BLOOD PRESSURE: 76 MMHG | HEIGHT: 65 IN

## 2024-03-11 DIAGNOSIS — Z98.890 H/O CARDIAC RADIOFREQUENCY ABLATION: ICD-10-CM

## 2024-03-11 DIAGNOSIS — E78.2 MIXED HYPERLIPIDEMIA: ICD-10-CM

## 2024-03-11 DIAGNOSIS — G45.9 TIA (TRANSIENT ISCHEMIC ATTACK): ICD-10-CM

## 2024-03-11 DIAGNOSIS — I10 ESSENTIAL HYPERTENSION: ICD-10-CM

## 2024-03-11 DIAGNOSIS — R07.2 SUBSTERNAL PRECORDIAL CHEST PAIN: ICD-10-CM

## 2024-03-11 DIAGNOSIS — R00.2 HEART PALPITATIONS: ICD-10-CM

## 2024-03-11 DIAGNOSIS — I48.0 PAROXYSMAL ATRIAL FIBRILLATION (HCC): Primary | ICD-10-CM

## 2024-03-11 PROCEDURE — 3074F SYST BP LT 130 MM HG: CPT | Performed by: INTERNAL MEDICINE

## 2024-03-11 PROCEDURE — 3078F DIAST BP <80 MM HG: CPT | Performed by: INTERNAL MEDICINE

## 2024-03-11 PROCEDURE — 99214 OFFICE O/P EST MOD 30 MIN: CPT | Performed by: INTERNAL MEDICINE

## 2024-03-11 PROCEDURE — 93000 ELECTROCARDIOGRAM COMPLETE: CPT | Performed by: INTERNAL MEDICINE

## 2024-03-11 PROCEDURE — 1123F ACP DISCUSS/DSCN MKR DOCD: CPT | Performed by: INTERNAL MEDICINE

## 2024-03-11 ASSESSMENT — ENCOUNTER SYMPTOMS
RESPIRATORY NEGATIVE: 1
COUGH: 0
SHORTNESS OF BREATH: 0
GASTROINTESTINAL NEGATIVE: 1

## 2024-03-11 NOTE — PROGRESS NOTES
[facility] CARDIOLOGY, [address], [address]     [place] [id number]      Patient:  Chaya Miranda  1955         SUBJECTIVE:  Chaya Miranda is a  68 y.o. female seen for a follow up visit regarding the following:     Chief Complaint   Patient presents with    Hypertension     CC: atrial fibrillation follow up.      HPI:   68 y.o. female with a history of HTN, paroxysmal atrial fibrillation s/p ablation, GERD, anxiety, TIA who is here for follow-up.    Patient was last seen in office on 10/26/23 , since then reports that she has been having some irregular heart beats. Have been happening frequently. About 3 weeks ago had chest pain complaint, substernal sharp. No obviously aggravating, severe. Some associated dyspnea. No diaphoresis. Some worsening with deep inspiration. Reports that her HR has been elevated 80-90s BPM.     Cardiovascular Testing:  - FARZANEH 10/17/2023: LVEF greater than 55%, normal wall motion, RV normal, trace MR, trace TR.  No pericardial effusion identified.  - EP lab 10/16/2023: Atrial fibrillation ablation ([facility]).  - ZIO 12/23/22, 12:04pm to 01/06/23, 07:37am: Sinus rhythm. Ectopy <1%. No sustained arrhythmias.   - Echo 3/13/21: LVEF >55% , RV normal, Valves: mild MR, mild TR, negative bubble study  - 30 Day Monitor 7/6/21-8/4/21: paroxysmal atrial fibrillation, interval monitor results with Dr Madden revealed atrial fibrillation and she was started on Eliquis    Past medical history, past surgical history, family history, social history, and medications were all reviewed with the patient today and updated as necessary.         Patient Active Problem List    Diagnosis Date Noted    PAF (paroxysmal atrial fibrillation) (HCC) 03/18/2022     Priority: Low    TIA (transient ischemic attack) 07/03/2021     Priority: Low    Left upper extremity numbness 03/13/2021     Priority: Low    Hyperlipidemia 03/13/2021     Priority: Low    Tremor 03/13/2021     Priority: Low

## 2024-03-20 ENCOUNTER — TELEPHONE (OUTPATIENT)
Age: 69
End: 2024-03-20

## 2024-03-20 NOTE — TELEPHONE ENCOUNTER
[facility] calling for holter results. Please fax: [phone] as soon as possible

## 2024-03-20 NOTE — TELEPHONE ENCOUNTER
14 day monitor placed on 3/11/24 called and lvm informing [facility] that monitor was placed and it would be a few weeks before we get the results. Will send report once it is received.

## 2024-04-01 ENCOUNTER — TELEPHONE (OUTPATIENT)
Age: 69
End: 2024-04-01

## 2024-04-01 NOTE — TELEPHONE ENCOUNTER
----- Message from Amado Durand DO sent at 3/31/2024  2:44 PM EDT -----  Please call the patient regarding their monitor result.    No dangerous heart rhythms found on your heart monitor you wore recently.  Brief episode 29 seconds long of a heart rhythm called SVT, this is generally treated with medications such as the propranolol she already takes.  Monitor for recurrence of symptoms.    Continue current medications and keep scheduled follow-up.

## 2024-04-16 ENCOUNTER — TELEPHONE (OUTPATIENT)
Age: 69
End: 2024-04-16

## 2024-04-16 NOTE — TELEPHONE ENCOUNTER
----- Message from Amado Durand DO sent at 4/16/2024  1:36 PM EDT -----  Please call the patient regarding their normal result.    Normal heart function and blood flow on your recent stress test.

## 2024-05-08 ENCOUNTER — OFFICE VISIT (OUTPATIENT)
Age: 69
End: 2024-05-08
Payer: MEDICARE

## 2024-05-08 VITALS
BODY MASS INDEX: 27.66 KG/M2 | WEIGHT: 166 LBS | HEIGHT: 65 IN | DIASTOLIC BLOOD PRESSURE: 68 MMHG | HEART RATE: 80 BPM | SYSTOLIC BLOOD PRESSURE: 114 MMHG

## 2024-05-08 DIAGNOSIS — I48.0 PAROXYSMAL ATRIAL FIBRILLATION (HCC): Primary | ICD-10-CM

## 2024-05-08 DIAGNOSIS — Z98.890 H/O CARDIAC RADIOFREQUENCY ABLATION: ICD-10-CM

## 2024-05-08 DIAGNOSIS — R25.1 TREMOR: ICD-10-CM

## 2024-05-08 DIAGNOSIS — E78.2 MIXED HYPERLIPIDEMIA: ICD-10-CM

## 2024-05-08 DIAGNOSIS — I48.0 PAF (PAROXYSMAL ATRIAL FIBRILLATION) (HCC): ICD-10-CM

## 2024-05-08 DIAGNOSIS — I10 ESSENTIAL HYPERTENSION: ICD-10-CM

## 2024-05-08 PROCEDURE — 1123F ACP DISCUSS/DSCN MKR DOCD: CPT | Performed by: INTERNAL MEDICINE

## 2024-05-08 PROCEDURE — 93000 ELECTROCARDIOGRAM COMPLETE: CPT | Performed by: INTERNAL MEDICINE

## 2024-05-08 PROCEDURE — 3074F SYST BP LT 130 MM HG: CPT | Performed by: INTERNAL MEDICINE

## 2024-05-08 PROCEDURE — 99214 OFFICE O/P EST MOD 30 MIN: CPT | Performed by: INTERNAL MEDICINE

## 2024-05-08 PROCEDURE — 3078F DIAST BP <80 MM HG: CPT | Performed by: INTERNAL MEDICINE

## 2024-05-08 RX ORDER — RAMIPRIL 10 MG/1
10 CAPSULE ORAL DAILY
Qty: 90 CAPSULE | Refills: 3 | Status: SHIPPED | OUTPATIENT
Start: 2024-05-08

## 2024-05-08 ASSESSMENT — ENCOUNTER SYMPTOMS
SHORTNESS OF BREATH: 0
VOMITING: 1
RESPIRATORY NEGATIVE: 1
HEMATOCHEZIA: 0
NAUSEA: 1
COUGH: 0

## 2024-05-08 NOTE — PROGRESS NOTES
[address], [address]     [place] [address]      Patient:  Chaya Miranda  1955         SUBJECTIVE:  Chaya Miranda is a  69 y.o. female seen for a follow up visit regarding the following:     Chief Complaint   Patient presents with    Atrial Fibrillation    Results     Monitor/survey     CC: atrial fibrillation follow up.      HPI:   69  y.o. female with a history of HTN, paroxysmal atrial fibrillation s/p ablation, GERD, anxiety, TIA who is here for follow-up.    Patient was last seen in office on 3/11/24 , since then reports that she has had a few episodes of substernal chest pain, sharp, lasts for about a few seconds. Reports that her BP has been low at times, HR generally in 80s range. Continues to take her propranolol for tremors, which helps to control her symptoms. Some lower range BP at home, into the 90s systolic. No LOC , syncope.     Cardiovascular Testing:  - SPECT 4/16/24: normal perfusion, LVEF 70%, low risk scan  - ZIO 03/11/24, 02:38pm to 03/25/24, 08:25am: Sinus rhythm 29 sec episode of SVT. Ectopy less than 1%. No atrial fibrillation or atrial flutter, no VT, no high degree heart block, or prolonged pauses (greater than 3 seconds).   - FARZANEH 10/17/2023: LVEF greater than 55%, normal wall motion, RV normal, trace MR, trace TR.  No pericardial effusion identified.  - EP lab 10/16/2023: Atrial fibrillation ablation ([facility]).  - ZIO 12/23/22, 12:04pm to 01/06/23, 07:37am: Sinus rhythm. Ectopy <1%. No sustained arrhythmias.   - Echo 3/13/21: LVEF >55% , RV normal, Valves: mild MR, mild TR, negative bubble study  - 30 Day Monitor 7/6/21-8/4/21: paroxysmal atrial fibrillation, interval monitor results with Dr Madden revealed atrial fibrillation and she was started on Eliquis    Past medical history, past surgical history, family history, social history, and medications were all reviewed with the patient today and updated as necessary.         Patient

## 2024-11-08 ENCOUNTER — OFFICE VISIT (OUTPATIENT)
Age: 69
End: 2024-11-08

## 2024-11-08 VITALS
DIASTOLIC BLOOD PRESSURE: 68 MMHG | SYSTOLIC BLOOD PRESSURE: 112 MMHG | HEART RATE: 76 BPM | HEIGHT: 65 IN | BODY MASS INDEX: 26.99 KG/M2 | WEIGHT: 162 LBS

## 2024-11-08 DIAGNOSIS — R25.1 TREMOR: ICD-10-CM

## 2024-11-08 DIAGNOSIS — Z98.890 H/O CARDIAC RADIOFREQUENCY ABLATION: ICD-10-CM

## 2024-11-08 DIAGNOSIS — I48.0 PAROXYSMAL ATRIAL FIBRILLATION (HCC): Primary | ICD-10-CM

## 2024-11-08 DIAGNOSIS — E78.2 MIXED HYPERLIPIDEMIA: ICD-10-CM

## 2024-11-08 DIAGNOSIS — I10 ESSENTIAL HYPERTENSION: ICD-10-CM

## 2024-11-08 RX ORDER — CARVEDILOL 6.25 MG/1
6.25 TABLET ORAL 2 TIMES DAILY WITH MEALS
COMMUNITY
Start: 2024-09-25

## 2024-11-08 RX ORDER — ROPINIROLE 5 MG/1
5 TABLET, FILM COATED ORAL NIGHTLY
COMMUNITY

## 2024-11-08 RX ORDER — ROSUVASTATIN CALCIUM 40 MG/1
40 TABLET, COATED ORAL DAILY
COMMUNITY
Start: 2024-09-25

## 2024-11-08 ASSESSMENT — ENCOUNTER SYMPTOMS
RESPIRATORY NEGATIVE: 1
COUGH: 0
SHORTNESS OF BREATH: 0

## 2024-11-08 NOTE — PROGRESS NOTES
3/11/2024), Disp: , Rfl:     levoFLOXacin (LEVAQUIN) 750 MG tablet, Take 1 tablet by mouth daily (Patient not taking: Reported on 3/11/2024), Disp: , Rfl:     FARXIGA 10 MG tablet, , Disp: , Rfl:     ferrous sulfate (FE TABS 325) 325 (65 Fe) MG EC tablet, Take 1 tablet by mouth Daily, Disp: , Rfl:      ASSESSMENT/PLAN:    Cardiovascular Testing:  - SPECT 4/16/24: normal perfusion, LVEF 70%, low risk scan  - ZIO 03/11/24, 02:38pm to 03/25/24, 08:25am: Sinus rhythm 29 sec episode of SVT. Ectopy less than 1%. No atrial fibrillation or atrial flutter, no VT, no high degree heart block, or prolonged pauses (greater than 3 seconds).   - FARZANEH 10/17/2023: LVEF greater than 55%, normal wall motion, RV normal, trace MR, trace TR.  No pericardial effusion identified.  - EP lab 10/16/2023: Atrial fibrillation ablation ([facility]).  - ZIO 12/23/22, 12:04pm to 01/06/23, 07:37am: Sinus rhythm. Ectopy <1%. No sustained arrhythmias.   - Echo 3/13/21: LVEF >55% , RV normal, Valves: mild MR, mild TR, negative bubble study  - 30 Day Monitor 7/6/21-8/4/21: paroxysmal atrial fibrillation, interval monitor results with Dr Madden revealed atrial fibrillation and she was started on Eliquis.      1. PAF (paroxysmal atrial fibrillation) (HCC)  2. H/O cardiac radiofrequency ablation  - s/p ablation.  - Continue Eliquis , elevated [id number] score = 5 (female, HTN, age, CVA/TIA history).  Warrants continued anticoagulation for stroke prophylaxis  - Currently on Coreg, previously on Propranolol.      3. TIA (transient ischemic attack)  - continue risk factor modification as possible, hold ASA given Eliquis use. No strong indication for aspirin at this time.      4. Essential hypertension  - controlled at this time      5. Mixed hyperlipidemia  - continue statin given history of TIA. Check CMP And lipids when fasting with PCP.      6. Chest Pain  - recent SPECT as above with normal perfusion  - symptoms improved, continue current medications.

## 2025-04-08 ENCOUNTER — TELEPHONE (OUTPATIENT)
Age: 70
End: 2025-04-08

## 2025-04-08 NOTE — TELEPHONE ENCOUNTER
MEDICATION REFILL REQUEST      Name of Medication:  Eliquis   Dose:  5mg  Frequency:  twice daily  Quantity:  60  Days' supply:  90      Pharmacy Name/Location:  [facility]          pt, will be seeing Dr.De vegas 5/12/25, Pt is almost out.

## 2025-05-12 ENCOUNTER — OFFICE VISIT (OUTPATIENT)
Age: 70
End: 2025-05-12
Payer: MEDICARE

## 2025-05-12 VITALS
SYSTOLIC BLOOD PRESSURE: 138 MMHG | BODY MASS INDEX: 22.99 KG/M2 | WEIGHT: 138 LBS | DIASTOLIC BLOOD PRESSURE: 78 MMHG | HEIGHT: 65 IN | HEART RATE: 71 BPM

## 2025-05-12 DIAGNOSIS — E78.2 MIXED HYPERLIPIDEMIA: ICD-10-CM

## 2025-05-12 DIAGNOSIS — I10 ESSENTIAL HYPERTENSION: ICD-10-CM

## 2025-05-12 DIAGNOSIS — I48.0 PAROXYSMAL ATRIAL FIBRILLATION (HCC): Primary | ICD-10-CM

## 2025-05-12 DIAGNOSIS — G47.33 OSA (OBSTRUCTIVE SLEEP APNEA): ICD-10-CM

## 2025-05-12 DIAGNOSIS — Z98.890 H/O CARDIAC RADIOFREQUENCY ABLATION: ICD-10-CM

## 2025-05-12 PROCEDURE — 1160F RVW MEDS BY RX/DR IN RCRD: CPT | Performed by: INTERNAL MEDICINE

## 2025-05-12 PROCEDURE — 93000 ELECTROCARDIOGRAM COMPLETE: CPT | Performed by: INTERNAL MEDICINE

## 2025-05-12 PROCEDURE — 1036F TOBACCO NON-USER: CPT | Performed by: INTERNAL MEDICINE

## 2025-05-12 PROCEDURE — 3017F COLORECTAL CA SCREEN DOC REV: CPT | Performed by: INTERNAL MEDICINE

## 2025-05-12 PROCEDURE — 3075F SYST BP GE 130 - 139MM HG: CPT | Performed by: INTERNAL MEDICINE

## 2025-05-12 PROCEDURE — G8420 CALC BMI NORM PARAMETERS: HCPCS | Performed by: INTERNAL MEDICINE

## 2025-05-12 PROCEDURE — 3078F DIAST BP <80 MM HG: CPT | Performed by: INTERNAL MEDICINE

## 2025-05-12 PROCEDURE — G8400 PT W/DXA NO RESULTS DOC: HCPCS | Performed by: INTERNAL MEDICINE

## 2025-05-12 PROCEDURE — 1123F ACP DISCUSS/DSCN MKR DOCD: CPT | Performed by: INTERNAL MEDICINE

## 2025-05-12 PROCEDURE — 1159F MED LIST DOCD IN RCRD: CPT | Performed by: INTERNAL MEDICINE

## 2025-05-12 PROCEDURE — G8427 DOCREV CUR MEDS BY ELIG CLIN: HCPCS | Performed by: INTERNAL MEDICINE

## 2025-05-12 PROCEDURE — 1090F PRES/ABSN URINE INCON ASSESS: CPT | Performed by: INTERNAL MEDICINE

## 2025-05-12 PROCEDURE — 99214 OFFICE O/P EST MOD 30 MIN: CPT | Performed by: INTERNAL MEDICINE

## 2025-05-12 PROCEDURE — 1126F AMNT PAIN NOTED NONE PRSNT: CPT | Performed by: INTERNAL MEDICINE

## 2025-05-12 RX ORDER — PROPRANOLOL HYDROCHLORIDE 40 MG/1
40 TABLET ORAL DAILY
COMMUNITY

## 2025-05-12 RX ORDER — ASCORBIC ACID 500 MG
500 TABLET ORAL DAILY
COMMUNITY

## 2025-05-12 ASSESSMENT — ENCOUNTER SYMPTOMS
HOARSE VOICE: 0
ABDOMINAL PAIN: 0
HEMATOCHEZIA: 0
HEMOPTYSIS: 0
HEMATEMESIS: 0
DOUBLE VISION: 0
EYE REDNESS: 0
WHEEZING: 0
STRIDOR: 0

## 2025-05-12 NOTE — PROGRESS NOTES
seizures.   Psychiatric/Behavioral:  Negative for altered mental status and suicidal ideas.    Allergic/Immunologic: Negative for hives.       PHYSICAL EXAM:    /78   Pulse 71   Ht 1.651 m (5' 5\")   Wt 62.6 kg (138 lb)   BMI 22.96 kg/m²      Physical Exam    General: Alert and oriented in no acute distress  HEENT: Head is normocephalic, atraumatic, pupils are equal bilaterally, throat appears to be clear  Neck: No significant jugular venous distention no cervical bruits  Cardiovascular: S1 and S2 heard, regular rate and rhythm, no significant murmurs rubs or gallops.   Respiratory: Clear to auscultation bilaterally with no adventitious sounds, respirations are normal  Abdomen: Soft, nontender, nondistended, bowel sounds present.  Extremities: No cyanosis clubbing or edema  Peripheral pulses: Bilateral radial artery pulses are palpated.  Bilateral pedal pulses are well felt.  Neuro: No facial droop and no gross focal motor deficits  Lymphatic: No significant cervical lymphadenopathy noted.  Musculoskeletal: No significant redness or swelling noted in all exposed joints.  Skin: No significant rashes noted the of the exposed regions.       Medical problems and test results were reviewed with the patient today.     No results found for this or any previous visit (from the past 4 weeks).  Lab Results   Component Value Date/Time    CHOL 165 07/04/2021 04:56 AM    HDL 52 07/04/2021 04:56 AM    LDL 71.2 07/04/2021 04:56 AM    VLDL 41.8 07/04/2021 04:56 AM   ,hemoglobin, basic metabolic panel, No results found for: \"TSH\", \"TSH2\", \"TSH3\" ,  Lab Results   Component Value Date/Time     10/16/2023 07:26 AM    K 4.1 10/16/2023 07:26 AM     10/16/2023 07:26 AM    CO2 28 10/16/2023 07:26 AM    BUN 20 10/16/2023 07:26 AM    GFRAA >60 07/04/2021 04:56 AM    GLOB 3.3 08/09/2023 03:02 PM    ALT 24 08/09/2023 03:02 PM    AST 40 08/09/2023 03:02 PM      Lab Results   Component Value Date    LDLDIRECT 141 (H)

## (undated) DEVICE — CATHETER US GE COMPATIBILITY SOUNDSTAR ECO 10FR

## (undated) DEVICE — CATHETER ABLAT 8FR L115CM 1-6-2MM SPC TIP 3.5MM FJ CRV

## (undated) DEVICE — PRESSURE MONITORING SET: Brand: TRUWAVE

## (undated) DEVICE — 18G NG KIT WITH 96IN PROBE COVER (10 PK): Brand: SITE-RITE

## (undated) DEVICE — INTRODUCER SHTH CARDIAGUIDE FCL20100

## (undated) DEVICE — CATHETER EP 7FR L115CM 2-8-2MM SPC TIP 2MM 10 ELECTRD D CRV

## (undated) DEVICE — SYSTEM CLOSURE 6-12 FR VEN VASC VASCADE MVP

## (undated) DEVICE — Device: Brand: NRG TRANSSEPTAL NEEDLE

## (undated) DEVICE — PATCH REF EXT FOR CARTO 3 SYS (EA = 6 PACKS)

## (undated) DEVICE — CATHETER EP 7FR L115CM 2-8-2MM SPC TIP 2MM 10 ELECTRD FJ

## (undated) DEVICE — TUBING PMP FOR CARTO SYS SMARTABLATE

## (undated) DEVICE — PINNACLE TIF INTRODUCER SHEATH: Brand: PINNACLE

## (undated) DEVICE — PINNACLE INTRODUCER SHEATH: Brand: PINNACLE

## (undated) DEVICE — CATHETER MAP F CRV 2-2-2-2-2 MM SPC PERSEID OCTARAY